# Patient Record
Sex: FEMALE | Race: WHITE | Employment: OTHER | URBAN - METROPOLITAN AREA
[De-identification: names, ages, dates, MRNs, and addresses within clinical notes are randomized per-mention and may not be internally consistent; named-entity substitution may affect disease eponyms.]

---

## 2017-03-06 ENCOUNTER — HOSPITAL ENCOUNTER (OUTPATIENT)
Dept: GENERAL RADIOLOGY | Age: 72
Discharge: HOME | End: 2017-03-06
Payer: MEDICARE

## 2017-03-06 DIAGNOSIS — M25.561 ACUTE PAIN OF RIGHT KNEE: ICD-10-CM

## 2017-03-06 DIAGNOSIS — M25.551 PAIN OF RIGHT HIP JOINT: ICD-10-CM

## 2017-03-06 PROCEDURE — 73502 X-RAY EXAM HIP UNI 2-3 VIEWS: CPT

## 2017-03-06 PROCEDURE — 73562 X-RAY EXAM OF KNEE 3: CPT

## 2018-02-09 ENCOUNTER — HOSPITAL ENCOUNTER (OUTPATIENT)
Dept: LAB | Age: 73
Discharge: HOME | End: 2018-02-09

## 2018-02-09 PROCEDURE — 88305 TISSUE EXAM BY PATHOLOGIST: CPT | Performed by: INTERNAL MEDICINE

## 2018-05-11 PROBLEM — D12.6 TUBULAR ADENOMA OF COLON: Status: ACTIVE | Noted: 2018-05-11

## 2018-05-11 PROBLEM — Z80.0 FH: COLON CANCER: Status: ACTIVE | Noted: 2018-05-11

## 2018-05-25 ENCOUNTER — HOSPITAL ENCOUNTER (OUTPATIENT)
Dept: GENERAL RADIOLOGY | Age: 73
Discharge: HOME | End: 2018-05-25
Payer: MEDICARE

## 2018-05-25 DIAGNOSIS — R05.9 COUGH: ICD-10-CM

## 2018-05-25 PROCEDURE — 71046 X-RAY EXAM CHEST 2 VIEWS: CPT

## 2018-05-25 NOTE — PROGRESS NOTES
Results given to pt. CXR only scarring. Pt to hold lotensin over the weekend and to continue taking the singular. Call us and let us know how the cough is on Tues. If cough persists then we will refer to pulmonary. Pt agrees with the plan.

## 2018-05-25 NOTE — PROGRESS NOTES
Nothing acute - old scarring. Continue on Singulair - cough may be lingering. Try to hold benazipril this weekend x 2-3 days and see if cough resolves. If it persists, we can send to pulmonary.   aw

## 2019-05-14 PROBLEM — R31.9 HEMATURIA: Status: ACTIVE | Noted: 2019-05-14

## 2019-05-14 PROBLEM — N81.2 CYSTOCELE AND RECTOCELE WITH INCOMPLETE UTEROVAGINAL PROLAPSE: Status: ACTIVE | Noted: 2019-05-14

## 2019-05-14 PROBLEM — M15.9 PRIMARY OSTEOARTHRITIS INVOLVING MULTIPLE JOINTS: Status: ACTIVE | Noted: 2019-05-14

## 2019-08-13 ENCOUNTER — HOSPITAL ENCOUNTER (OUTPATIENT)
Dept: PHYSICAL THERAPY | Age: 74
Discharge: HOME | End: 2019-08-13
Payer: MEDICARE

## 2019-08-13 ENCOUNTER — HOME HEALTH ADMISSION (OUTPATIENT)
Dept: HOME HEALTH SERVICES | Facility: HOME HEALTH | Age: 74
End: 2019-08-13
Payer: MEDICARE

## 2019-08-13 ENCOUNTER — HOSPITAL ENCOUNTER (OUTPATIENT)
Dept: SURGERY | Age: 74
Discharge: HOME | End: 2019-08-13
Payer: MEDICARE

## 2019-08-13 VITALS
WEIGHT: 166 LBS | HEIGHT: 67 IN | DIASTOLIC BLOOD PRESSURE: 85 MMHG | TEMPERATURE: 96.7 F | HEART RATE: 89 BPM | SYSTOLIC BLOOD PRESSURE: 130 MMHG | OXYGEN SATURATION: 97 % | BODY MASS INDEX: 26.06 KG/M2 | RESPIRATION RATE: 16 BRPM

## 2019-08-13 LAB
ANION GAP SERPL CALC-SCNC: 5 MMOL/L (ref 7–16)
APTT PPP: 25.6 SEC (ref 24.7–39.8)
ATRIAL RATE: 79 BPM
BACTERIA SPEC CULT: ABNORMAL
BASOPHILS # BLD: 0.1 K/UL (ref 0–0.2)
BASOPHILS NFR BLD: 1 % (ref 0–2)
BUN SERPL-MCNC: 10 MG/DL (ref 8–23)
CALCIUM SERPL-MCNC: 9.7 MG/DL (ref 8.3–10.4)
CALCULATED P AXIS, ECG09: 74 DEGREES
CALCULATED R AXIS, ECG10: -6 DEGREES
CALCULATED T AXIS, ECG11: 73 DEGREES
CHLORIDE SERPL-SCNC: 107 MMOL/L (ref 98–107)
CO2 SERPL-SCNC: 30 MMOL/L (ref 21–32)
CREAT SERPL-MCNC: 0.87 MG/DL (ref 0.6–1)
DIAGNOSIS, 93000: NORMAL
DIFFERENTIAL METHOD BLD: ABNORMAL
EOSINOPHIL # BLD: 0.4 K/UL (ref 0–0.8)
EOSINOPHIL NFR BLD: 5 % (ref 0.5–7.8)
ERYTHROCYTE [DISTWIDTH] IN BLOOD BY AUTOMATED COUNT: 13.2 % (ref 11.9–14.6)
GLUCOSE SERPL-MCNC: 111 MG/DL (ref 65–100)
HCT VFR BLD AUTO: 47.9 % (ref 35.8–46.3)
HGB BLD-MCNC: 15.1 G/DL (ref 11.7–15.4)
IMM GRANULOCYTES # BLD AUTO: 0 K/UL (ref 0–0.5)
IMM GRANULOCYTES NFR BLD AUTO: 0 % (ref 0–5)
INR PPP: 1
LYMPHOCYTES # BLD: 2.6 K/UL (ref 0.5–4.6)
LYMPHOCYTES NFR BLD: 33 % (ref 13–44)
MCH RBC QN AUTO: 27.8 PG (ref 26.1–32.9)
MCHC RBC AUTO-ENTMCNC: 31.5 G/DL (ref 31.4–35)
MCV RBC AUTO: 88.2 FL (ref 79.6–97.8)
MONOCYTES # BLD: 0.8 K/UL (ref 0.1–1.3)
MONOCYTES NFR BLD: 10 % (ref 4–12)
NEUTS SEG # BLD: 4 K/UL (ref 1.7–8.2)
NEUTS SEG NFR BLD: 51 % (ref 43–78)
NRBC # BLD: 0 K/UL (ref 0–0.2)
P-R INTERVAL, ECG05: 174 MS
PLATELET # BLD AUTO: 256 K/UL (ref 150–450)
PMV BLD AUTO: 8.8 FL (ref 9.4–12.3)
POTASSIUM SERPL-SCNC: 3.7 MMOL/L (ref 3.5–5.1)
PROTHROMBIN TIME: 13.1 SEC (ref 11.7–14.5)
Q-T INTERVAL, ECG07: 380 MS
QRS DURATION, ECG06: 74 MS
QTC CALCULATION (BEZET), ECG08: 435 MS
RBC # BLD AUTO: 5.43 M/UL (ref 4.05–5.2)
SERVICE CMNT-IMP: ABNORMAL
SODIUM SERPL-SCNC: 142 MMOL/L (ref 136–145)
VENTRICULAR RATE, ECG03: 79 BPM
WBC # BLD AUTO: 7.9 K/UL (ref 4.3–11.1)

## 2019-08-13 PROCEDURE — 85025 COMPLETE CBC W/AUTO DIFF WBC: CPT

## 2019-08-13 PROCEDURE — 80048 BASIC METABOLIC PNL TOTAL CA: CPT

## 2019-08-13 PROCEDURE — 77030027138 HC INCENT SPIROMETER -A

## 2019-08-13 PROCEDURE — 36415 COLL VENOUS BLD VENIPUNCTURE: CPT

## 2019-08-13 PROCEDURE — 85730 THROMBOPLASTIN TIME PARTIAL: CPT

## 2019-08-13 PROCEDURE — 85610 PROTHROMBIN TIME: CPT

## 2019-08-13 PROCEDURE — 87641 MR-STAPH DNA AMP PROBE: CPT

## 2019-08-13 PROCEDURE — 97161 PT EVAL LOW COMPLEX 20 MIN: CPT

## 2019-08-13 PROCEDURE — 93005 ELECTROCARDIOGRAM TRACING: CPT | Performed by: ANESTHESIOLOGY

## 2019-08-13 NOTE — PROGRESS NOTES
SW met with pt in Prehab to discuss Right HALEIGH scheduled for 9/5/19. Pt plans to return home with spouse and HHPT at [place]. Pt resides in [facility] and is agreeable to [facility] . [facility] referral completed. Pt does not have any DME and will need a RW and BSC at [place]. SW will meet with pt postop to verify DME needs. No additional needs or questions identified at this time.    Sudha Jordan

## 2019-08-13 NOTE — PERIOP NOTES
Patient verified name and . Order for consent is found in EHR and matches case posting; patient verified. Type 3 surgery, Joint camp assessment complete. Labs per surgeon: cbc,bmp,pt,ptt,ua,mrsa swab ; results within anesthesia protocols. Labs per anesthesia protocol: included in surgeon's orders. EKG:done today - is within anesthesia protocols. MRSA/MSSA swab collected; pharmacy to review and dose antibiotic as appropriate. Hospital approved surgical skin cleanser and instructions to return bottle on DOS given per hospital policy. Patient provided with handouts including Guide to Surgery, Pain Management, Hand Hygiene, Blood Transfusion Education, and [place] [facility] Brochure. Patient answered medical/surgical history questions at their best of ability. All prior to admission medications documented in [facility]. Original medication prescription bottle not visualized during patient appointment. Patient instructed to hold all vitamins 7 days prior to surgery and NSAIDS 5 days prior to surgery. Prescription medications to hold: ASA, MVI    Patient instructed to continue previous medications as prescribed prior to surgery and to take the following medications the day of surgery according to anesthesia guidelines with a small sip of water: amlodipine, levothyroxine. Patient teach back successful and patient demonstrates knowledge of instruction.

## 2019-08-13 NOTE — PERIOP NOTES
Your patient recently had labs drawn during a hospital appointment due to an upcoming surgery. The results are attached. If you have any questions or concerns please reach out to your patient for a follow-up in your office. Please do not respond to this message as my mailbox is not monitored. You may call [phone] with questions or concerns. Recent Results (from the past 12 hour(s))   CBC WITH AUTOMATED DIFF    Collection Time: 08/13/19  7:30 AM   Result Value Ref Range    WBC 7.9 4.3 - 11.1 K/uL    RBC 5.43 (H) 4.05 - 5.2 M/uL    HGB 15.1 11.7 - 15.4 g/dL    HCT 47.9 (H) 35.8 - 46.3 %    MCV 88.2 79.6 - 97.8 FL    MCH 27.8 26.1 - 32.9 PG    MCHC 31.5 31.4 - 35.0 g/dL    RDW 13.2 11.9 - 14.6 %    PLATELET 347 709 - 918 K/uL    MPV 8.8 (L) 9.4 - 12.3 FL    ABSOLUTE NRBC 0.00 0.0 - 0.2 K/uL    DF AUTOMATED      NEUTROPHILS 51 43 - 78 %    LYMPHOCYTES 33 13 - 44 %    MONOCYTES 10 4.0 - 12.0 %    EOSINOPHILS 5 0.5 - 7.8 %    BASOPHILS 1 0.0 - 2.0 %    IMMATURE GRANULOCYTES 0 0.0 - 5.0 %    ABS. NEUTROPHILS 4.0 1.7 - 8.2 K/UL    ABS. LYMPHOCYTES 2.6 0.5 - 4.6 K/UL    ABS. MONOCYTES 0.8 0.1 - 1.3 K/UL    ABS. EOSINOPHILS 0.4 0.0 - 0.8 K/UL    ABS. BASOPHILS 0.1 0.0 - 0.2 K/UL    ABS. IMM.  GRANS. 0.0 0.0 - 0.5 K/UL   METABOLIC PANEL, BASIC    Collection Time: 08/13/19  7:30 AM   Result Value Ref Range    Sodium 142 136 - 145 mmol/L    Potassium 3.7 3.5 - 5.1 mmol/L    Chloride 107 98 - 107 mmol/L    CO2 30 21 - 32 mmol/L    Anion gap 5 (L) 7 - 16 mmol/L    Glucose 111 (H) 65 - 100 mg/dL    BUN 10 8 - 23 MG/DL    Creatinine 0.87 0.6 - 1.0 MG/DL    GFR est AA >60 >60 ml/min/1.73m2    GFR est non-AA >60 >60 ml/min/1.73m2    Calcium 9.7 8.3 - 10.4 MG/DL   PROTHROMBIN TIME + INR    Collection Time: 08/13/19  7:30 AM   Result Value Ref Range    Prothrombin time 13.1 11.7 - 14.5 sec    INR 1.0     PTT    Collection Time: 08/13/19  7:30 AM   Result Value Ref Range    aPTT 25.6 24.7 - 39.8 SEC   EKG, 12 LEAD, INITIAL Collection Time: 08/13/19  8:25 AM   Result Value Ref Range    Ventricular Rate 79 BPM    Atrial Rate 79 BPM    P-R Interval 174 ms    QRS Duration 74 ms    Q-T Interval 380 ms    QTC Calculation (Bezet) 435 ms    Calculated P Axis 74 degrees    Calculated R Axis -6 degrees    Calculated T Axis 73 degrees    Diagnosis       Sinus rhythm with Premature supraventricular complexes  Otherwise normal ECG  No previous ECGs available

## 2019-08-22 NOTE — ADVANCED PRACTICE NURSE
Total Joint Surgery Preoperative Chart Review      Patient ID:  Chanel William  [id number]  53 y.o.  1945  Surgeon: Dr. Hortensia Rankin  Date of Surgery: 9/5/2019  Procedure: Total Right Hip Arthroplasty  Primary Care Physician: Rich Bergeron 58  Specialty Physician(s):      Subjective:   Chanel William is a 69 y.o. WHITE [place]  female who presents for preoperative evaluation for Total Right Hip arthroplasty. This is a preoperative chart review note based on data collected by the nurse at the surgical Pre-Assessment visit. Past Medical History:   Diagnosis Date    Agatston coronary artery calcium score less than 100 5/2014    Calcium score of 24; all in LAD; referred to cardiology    Allergic rhinitis 1/22/2014    Arthritis     Colon polyps 1/2015    H/O cardiovascular stress test 5/2014    within normal    H/O mammogram 7/2013    with Dr. Karel Crisostomo Hematuria     HTN (hypertension) 1/22/2014    Hypercholesterolemia     Hypothyroidism 1/22/2014    Otitis externa     Otitis media     Pap smear for cervical cancer screening 7/2013    Dr. Karel Crisostomo Thyroid disease     URI (upper respiratory infection)       Past Surgical History:   Procedure Laterality Date    HX COLONOSCOPY  1/2015    repeat in 3 yrs (Dr. Ramesh Bernal)     Family History   Problem Relation Age of Onset    Heart Disease Father     Dementia Mother     Hypertension Mother     Glaucoma Mother     Heart Disease Brother     Glaucoma Brother     Diabetes Brother     Cancer Sister         Colon Cancer    No Known Problems Sister     Heart Disease Other     Diabetes Other     Hypertension Paternal Uncle       Social History     Tobacco Use    Smoking status: Never Smoker    Smokeless tobacco: Never Used   Substance Use Topics    Alcohol use: No       Prior to Admission medications    Medication Sig Start Date End Date Taking? Authorizing Provider   benazepril (LOTENSIN) 20 mg tablet Take 1 Tab by mouth daily. 11/20/18  Yes Sweta Foy MD   amLODIPine (NORVASC) 5 mg tablet Take 1 Tab by mouth daily. Patient taking differently: Take 5 mg by mouth daily. Take / use AM day of surgery  per anesthesia protocols. Indications: high blood pressure 11/20/18  Yes Sweta Foy MD   levothyroxine (SYNTHROID) 25 mcg tablet Take 1 Tab by mouth Daily (before breakfast). Patient taking differently: Take 25 mcg by mouth Daily (before breakfast). Take / use AM day of surgery  per anesthesia protocols. Indications: hypothyroidism 11/20/18  Yes Sweta Foy MD   pravastatin (PRAVACHOL) 10 mg tablet Take 1 Tab by mouth nightly. 11/20/18  Yes Sweta Foy MD   multivitamin (ONE A DAY) tablet Take 1 Tab by mouth daily. Yes Provider, Historical   aspirin delayed-release 81 mg tablet Take 81 mg by mouth daily. Yes Provider, Historical     No Known Allergies       Objective:     Physical Exam:   No data found. ECG:    EKG Results     Procedure 720 Value Units Date/Time    EKG, 12 LEAD, INITIAL [[id number]] Collected:  08/13/19 0825    Order Status:  Completed Updated:  08/13/19 1016     Ventricular Rate 79 BPM      Atrial Rate 79 BPM      P-R Interval 174 ms      QRS Duration 74 ms      Q-T Interval 380 ms      QTC Calculation (Bezet) 435 ms      Calculated P Axis 74 degrees      Calculated R Axis -6 degrees      Calculated T Axis 73 degrees      Diagnosis --     Sinus rhythm with Premature supraventricular complexes  Otherwise normal ECG  No previous ECGs available  Confirmed by Lili Child MD (), Prosper Garber ([id number]) on 8/13/2019 10:16:28 AM            Data Review:   Labs:     Results for Meena Ho (MRN [id number]) as of 8/22/2019 12:33   Ref.  Range 8/13/2019 07:30   Sodium Latest Ref Range: 136 - 145 mmol/L 142   Potassium Latest Ref Range: 3.5 - 5.1 mmol/L 3.7   Chloride Latest Ref Range: 98 - 107 mmol/L 107   CO2 Latest Ref Range: 21 - 32 mmol/L 30   Anion gap Latest Ref Range: 7 - 16 mmol/L 5 (L) Glucose Latest Ref Range: 65 - 100 mg/dL 111 (H)   BUN Latest Ref Range: 8 - 23 MG/DL 10   Creatinine Latest Ref Range: 0.6 - 1.0 MG/DL 0.87   Calcium Latest Ref Range: 8.3 - 10.4 MG/DL 9.7   GFR est non-AA Latest Ref Range: >60 ml/min/1.73m2 >60   GFR est AA Latest Ref Range: >60 ml/min/1.73m2 >60       Problem List:  )  Patient Active Problem List   Diagnosis Code    Allergic rhinitis J30.9    HTN (hypertension) I10    Hypothyroidism E03.9    Hypercholesterolemia E78.00    Diverticulosis of large intestine without hemorrhage K57.30    FH: colon cancer Z80.0    Tubular adenoma of colon D12.6    Cystocele and rectocele with incomplete uterovaginal prolapse N81.2    Primary osteoarthritis involving multiple joints M15.0    Hematuria R31.9       Total Joint Surgery Pre-Assessment Recommendations:       Recommend continuous saturation monitoring hours of sleep, during hospitalization.           Signed By: DORIAN Khan    August 22, 2019

## 2019-09-04 ENCOUNTER — ANESTHESIA EVENT (OUTPATIENT)
Dept: SURGERY | Age: 74
DRG: 470 | End: 2019-09-04
Payer: MEDICARE

## 2019-09-04 NOTE — H&P
H&P    Patient ID:  Ingrid Zavala  [id number]  89 y.o.  1945  Surgeon:  Felisa Najjar, MD  Date of Surgery: * No surgery date entered *  Procedure: Right Hip Total Arthroplasty  Primary Care Physician: Maida Leger MD        Subjective:  Ingrid Zavala is a 69 y.o. WHITE OR  female who presents with Right Hip pain. She has history of Right Hip pain for several months. Symptoms worse with walking long distances and relieved with rest. Conservative treatment consisting of  activity modification has not helped. The patient lives with their family. The patients goal after surgery is improved pain and function.         Past Medical History:   Diagnosis Date    Agatston coronary artery calcium score less than 100 5/2014    Calcium score of 24; all in LAD; referred to cardiology    Allergic rhinitis 1/22/2014    Arthritis     Colon polyps 1/2015    H/O cardiovascular stress test 5/2014    within normal    H/O mammogram 7/2013    with Dr. Leonila Hare Hematuria     HTN (hypertension) 1/22/2014    Hypercholesterolemia     Hypothyroidism 1/22/2014    Otitis externa     Otitis media     Pap smear for cervical cancer screening 7/2013    Dr. Leonila Hare Thyroid disease     URI (upper respiratory infection)       Past Surgical History:   Procedure Laterality Date    HX COLONOSCOPY  1/2015    repeat in 3 yrs (Dr. Tari Lyn)     Family History   Problem Relation Age of Onset    Heart Disease Father     Dementia Mother     Hypertension Mother     Glaucoma Mother     Heart Disease Brother     Glaucoma Brother     Diabetes Brother     Cancer Sister         Colon Cancer    No Known Problems Sister     Heart Disease Other     Diabetes Other     Hypertension Paternal Uncle       Social History     Tobacco Use    Smoking status: Never Smoker    Smokeless tobacco: Never Used   Substance Use Topics    Alcohol use: No       Prior to Admission medications    Medication Sig Start Date End Date Taking? Authorizing Provider   benazepril (LOTENSIN) 20 mg tablet Take 1 Tab by mouth daily. 11/20/18   Juancho Lynch MD   amLODIPine (NORVASC) 5 mg tablet Take 1 Tab by mouth daily. Patient taking differently: Take 5 mg by mouth daily. Take / use AM day of surgery  per anesthesia protocols. Indications: high blood pressure 11/20/18   Juancho Lynch MD   levothyroxine (SYNTHROID) 25 mcg tablet Take 1 Tab by mouth Daily (before breakfast). Patient taking differently: Take 25 mcg by mouth Daily (before breakfast). Take / use AM day of surgery  per anesthesia protocols. Indications: hypothyroidism 11/20/18   Ceci Quiñones MD   pravastatin (PRAVACHOL) 10 mg tablet Take 1 Tab by mouth nightly. 11/20/18   Jaycee Rhodes MD   multivitamin (ONE A DAY) tablet Take 1 Tab by mouth daily. Provider, Historical   aspirin delayed-release 81 mg tablet Take 81 mg by mouth daily. Provider, Historical     No Known Allergies     REVIEW OF SYSTEMS:  CONSTITUTIONAL: Denies fever, decreased appetite, weight loss/gain, night sweats or fatigue. HEENT: Denies vision or hearing changes. denies glasses. denies hearing aids. CARDIAC: Denies CP, palpitations, rheumatic fever, murmur, peripheral edema, carotid artery disease or syncopal episodes. RESPIRATORY: Denies dyspnea on exertion, asthma, COPD or orthopnea. GI: Denies GERD, history of GI bleed or melena, PUD, hepatitis or cirrhosis. : Denies dysuria, hematuria. denies BPH symptoms. HEMATOLOGIC: Denies anemia or blood disorders. ENDOCRINE: Denies thyroid disease. MUSCULOSKELETAL: See HPI. NEUROLOGIC: Denies seizure, peripheral neuropathy or memory loss. PSYCH: Denies depression, anxiety or insomnia. SKIN: Denies rash or open sores. Objective:    PHYSICAL EXAM  GENERAL:   Patient Vitals for the past 8 hrs:   Height Weight   09/04/19 0800 5' 6.5\" (1.689 m) 75.3 kg (166 lb)    EYES: PERRL. EOM intact. MOUTH:Teeth and Gums normal. NECK: Full ROM.  Trachea midline. No thyromegaly or JVD. CARDIOVASCULAR: Regular rate and rhythm. No murmur or gallops. No carotid bruits. Peripheral pulses: radial 2 +, PT 2+, DP 2+ bilaterally. LUNGS: CTA bilaterally. No wheezes, rhonchi or rales. GI: positive BS. Abdomen nontender. NEUROLOGIC: Alert and oriented x 3. Bilateral equal strong had grasp and bilateral equal strong plantar flexion and dorsiflexion. GAIT: abnormal MUSCULOSKELETAL: ROM: full without pain. Tenderness: . Crepitus: not present. SKIN: No rash, bruising, swelling, redness or warmth. Labs:  No results found for this or any previous visit (from the past 24 hour(s)). Xray Right Hip: joint space narrowing    Assessment:  Advanced Right Hip Osteoarthritis. Total Right Hip Arthroplasty Indicated. Patient Active Problem List   Diagnosis Code    Allergic rhinitis J30.9    HTN (hypertension) I10    Hypothyroidism E03.9    Hypercholesterolemia E78.00    Diverticulosis of large intestine without hemorrhage K57.30    FH: colon cancer Z80.0    Tubular adenoma of colon D12.6    Cystocele and rectocele with incomplete uterovaginal prolapse N81.2    Primary osteoarthritis involving multiple joints M15.0    Hematuria R31.9       Plan:  I have advised the patient of the risks and consequences, including possible complications of performing total joint replacement, as well as not doing this operation. The patient had the opportunity to ask questions and have them answered to their satisfaction.      Signed:  HEIDI Carmen 9/4/2019

## 2019-09-05 ENCOUNTER — HOSPITAL ENCOUNTER (INPATIENT)
Age: 74
LOS: 1 days | Discharge: HOME HEALTH | DRG: 470 | End: 2019-09-06
Attending: ORTHOPAEDIC SURGERY | Admitting: ORTHOPAEDIC SURGERY
Payer: MEDICARE

## 2019-09-05 ENCOUNTER — APPOINTMENT (OUTPATIENT)
Dept: GENERAL RADIOLOGY | Age: 74
DRG: 470 | End: 2019-09-05
Attending: ORTHOPAEDIC SURGERY
Payer: MEDICARE

## 2019-09-05 ENCOUNTER — ANESTHESIA (OUTPATIENT)
Dept: SURGERY | Age: 74
DRG: 470 | End: 2019-09-05
Payer: MEDICARE

## 2019-09-05 DIAGNOSIS — Z96.641 H/O TOTAL HIP ARTHROPLASTY, RIGHT: Primary | ICD-10-CM

## 2019-09-05 PROBLEM — M16.11 ARTHRITIS OF RIGHT HIP: Status: ACTIVE | Noted: 2019-09-05

## 2019-09-05 LAB
GLUCOSE BLD STRIP.AUTO-MCNC: 99 MG/DL (ref 65–100)
HGB BLD-MCNC: 12.9 G/DL (ref 11.7–15.4)

## 2019-09-05 PROCEDURE — 77030008462 HC STPLR SKN PROX J&J -A: Performed by: ORTHOPAEDIC SURGERY

## 2019-09-05 PROCEDURE — 77030012935 HC DRSG AQUACEL BMS -B: Performed by: ORTHOPAEDIC SURGERY

## 2019-09-05 PROCEDURE — 77030003666 HC NDL SPINAL BD -A: Performed by: ORTHOPAEDIC SURGERY

## 2019-09-05 PROCEDURE — 77030031139 HC SUT VCRL2 J&J -A: Performed by: ORTHOPAEDIC SURGERY

## 2019-09-05 PROCEDURE — 74011250636 HC RX REV CODE- 250/636: Performed by: ORTHOPAEDIC SURGERY

## 2019-09-05 PROCEDURE — C1776 JOINT DEVICE (IMPLANTABLE): HCPCS | Performed by: ORTHOPAEDIC SURGERY

## 2019-09-05 PROCEDURE — 74011250636 HC RX REV CODE- 250/636

## 2019-09-05 PROCEDURE — 94760 N-INVAS EAR/PLS OXIMETRY 1: CPT

## 2019-09-05 PROCEDURE — 77030037713 HC CLOSR DEV INCIS ZIP STRY -B: Performed by: ORTHOPAEDIC SURGERY

## 2019-09-05 PROCEDURE — 77030018836 HC SOL IRR NACL ICUM -A: Performed by: ORTHOPAEDIC SURGERY

## 2019-09-05 PROCEDURE — 77030018074 HC RTVR SUT ARTH4 S&N -B: Performed by: ORTHOPAEDIC SURGERY

## 2019-09-05 PROCEDURE — 74011000258 HC RX REV CODE- 258: Performed by: ORTHOPAEDIC SURGERY

## 2019-09-05 PROCEDURE — 77030040361 HC SLV COMPR DVT MDII -B

## 2019-09-05 PROCEDURE — 72170 X-RAY EXAM OF PELVIS: CPT

## 2019-09-05 PROCEDURE — 97161 PT EVAL LOW COMPLEX 20 MIN: CPT

## 2019-09-05 PROCEDURE — 36415 COLL VENOUS BLD VENIPUNCTURE: CPT

## 2019-09-05 PROCEDURE — 74011250637 HC RX REV CODE- 250/637: Performed by: ORTHOPAEDIC SURGERY

## 2019-09-05 PROCEDURE — 97165 OT EVAL LOW COMPLEX 30 MIN: CPT

## 2019-09-05 PROCEDURE — 97535 SELF CARE MNGMENT TRAINING: CPT

## 2019-09-05 PROCEDURE — 97110 THERAPEUTIC EXERCISES: CPT

## 2019-09-05 PROCEDURE — 76060000034 HC ANESTHESIA 1.5 TO 2 HR: Performed by: ORTHOPAEDIC SURGERY

## 2019-09-05 PROCEDURE — 74011000250 HC RX REV CODE- 250

## 2019-09-05 PROCEDURE — 77030013708 HC HNDPC SUC IRR PULS STRY –B: Performed by: ORTHOPAEDIC SURGERY

## 2019-09-05 PROCEDURE — 76210000006 HC OR PH I REC 0.5 TO 1 HR: Performed by: ORTHOPAEDIC SURGERY

## 2019-09-05 PROCEDURE — 76010000162 HC OR TIME 1.5 TO 2 HR INTENSV-TIER 1: Performed by: ORTHOPAEDIC SURGERY

## 2019-09-05 PROCEDURE — 0SR90JA REPLACEMENT OF RIGHT HIP JOINT WITH SYNTHETIC SUBSTITUTE, UNCEMENTED, OPEN APPROACH: ICD-10-PCS | Performed by: ORTHOPAEDIC SURGERY

## 2019-09-05 PROCEDURE — 74011250637 HC RX REV CODE- 250/637: Performed by: ANESTHESIOLOGY

## 2019-09-05 PROCEDURE — 77030037715 HC DRSG ZIP STRY -B: Performed by: ORTHOPAEDIC SURGERY

## 2019-09-05 PROCEDURE — 77030018547 HC SUT ETHBND1 J&J -B: Performed by: ORTHOPAEDIC SURGERY

## 2019-09-05 PROCEDURE — 77030006835 HC BLD SAW SAG STRY -B: Performed by: ORTHOPAEDIC SURGERY

## 2019-09-05 PROCEDURE — 74011250636 HC RX REV CODE- 250/636: Performed by: ANESTHESIOLOGY

## 2019-09-05 PROCEDURE — 65270000029 HC RM PRIVATE

## 2019-09-05 PROCEDURE — 77030018673: Performed by: ORTHOPAEDIC SURGERY

## 2019-09-05 PROCEDURE — 94762 N-INVAS EAR/PLS OXIMTRY CONT: CPT

## 2019-09-05 PROCEDURE — 77030002966 HC SUT PDS J&J -A: Performed by: ORTHOPAEDIC SURGERY

## 2019-09-05 PROCEDURE — 74011000250 HC RX REV CODE- 250: Performed by: ORTHOPAEDIC SURGERY

## 2019-09-05 PROCEDURE — 85018 HEMOGLOBIN: CPT

## 2019-09-05 PROCEDURE — 82962 GLUCOSE BLOOD TEST: CPT

## 2019-09-05 PROCEDURE — 77030020263 HC SOL INJ SOD CL0.9% LFCR 1000ML

## 2019-09-05 DEVICE — STEM FEM TAPR SZ4 STD OFFSET -- ACTIS: Type: IMPLANTABLE DEVICE | Site: HIP | Status: FUNCTIONAL

## 2019-09-05 DEVICE — LINER ACET OD56MM ID36MM HIP ALTRX PINN: Type: IMPLANTABLE DEVICE | Site: HIP | Status: FUNCTIONAL

## 2019-09-05 DEVICE — HEAD FEM DIA36MM +5MM OFFSET 12/14 TAPR HIP CERAMIC BIOLOX: Type: IMPLANTABLE DEVICE | Site: HIP | Status: FUNCTIONAL

## 2019-09-05 DEVICE — CUP ACET DIA56MM HIP GRIPTION PRI CEMENTLESS FIX 100 SER: Type: IMPLANTABLE DEVICE | Site: HIP | Status: FUNCTIONAL

## 2019-09-05 DEVICE — ELIMINATOR H APEX FOR 48-60MM PINN HIP SHELL: Type: IMPLANTABLE DEVICE | Site: HIP | Status: FUNCTIONAL

## 2019-09-05 RX ORDER — DIPHENHYDRAMINE HCL 25 MG
25 CAPSULE ORAL
Status: DISCONTINUED | OUTPATIENT
Start: 2019-09-05 | End: 2019-09-06 | Stop reason: HOSPADM

## 2019-09-05 RX ORDER — MIDAZOLAM HYDROCHLORIDE 1 MG/ML
2 INJECTION, SOLUTION INTRAMUSCULAR; INTRAVENOUS
Status: DISCONTINUED | OUTPATIENT
Start: 2019-09-05 | End: 2019-09-05 | Stop reason: HOSPADM

## 2019-09-05 RX ORDER — HYDROMORPHONE HYDROCHLORIDE 2 MG/ML
0.5 INJECTION, SOLUTION INTRAMUSCULAR; INTRAVENOUS; SUBCUTANEOUS
Status: DISCONTINUED | OUTPATIENT
Start: 2019-09-05 | End: 2019-09-05 | Stop reason: HOSPADM

## 2019-09-05 RX ORDER — ONDANSETRON 2 MG/ML
4 INJECTION INTRAMUSCULAR; INTRAVENOUS
Status: DISCONTINUED | OUTPATIENT
Start: 2019-09-05 | End: 2019-09-05 | Stop reason: HOSPADM

## 2019-09-05 RX ORDER — TRANEXAMIC ACID 100 MG/ML
INJECTION, SOLUTION INTRAVENOUS AS NEEDED
Status: DISCONTINUED | OUTPATIENT
Start: 2019-09-05 | End: 2019-09-05 | Stop reason: HOSPADM

## 2019-09-05 RX ORDER — ASPIRIN 81 MG/1
81 TABLET ORAL EVERY 12 HOURS
Status: DISCONTINUED | OUTPATIENT
Start: 2019-09-05 | End: 2019-09-06 | Stop reason: HOSPADM

## 2019-09-05 RX ORDER — CEFAZOLIN SODIUM/WATER 2 G/20 ML
2 SYRINGE (ML) INTRAVENOUS ONCE
Status: COMPLETED | OUTPATIENT
Start: 2019-09-05 | End: 2019-09-05

## 2019-09-05 RX ORDER — DEXAMETHASONE SODIUM PHOSPHATE 4 MG/ML
INJECTION, SOLUTION INTRA-ARTICULAR; INTRALESIONAL; INTRAMUSCULAR; INTRAVENOUS; SOFT TISSUE AS NEEDED
Status: DISCONTINUED | OUTPATIENT
Start: 2019-09-05 | End: 2019-09-05

## 2019-09-05 RX ORDER — DEXAMETHASONE SODIUM PHOSPHATE 4 MG/ML
INJECTION, SOLUTION INTRA-ARTICULAR; INTRALESIONAL; INTRAMUSCULAR; INTRAVENOUS; SOFT TISSUE AS NEEDED
Status: DISCONTINUED | OUTPATIENT
Start: 2019-09-05 | End: 2019-09-05 | Stop reason: HOSPADM

## 2019-09-05 RX ORDER — ROPIVACAINE HYDROCHLORIDE 2 MG/ML
INJECTION, SOLUTION EPIDURAL; INFILTRATION; PERINEURAL AS NEEDED
Status: DISCONTINUED | OUTPATIENT
Start: 2019-09-05 | End: 2019-09-05 | Stop reason: HOSPADM

## 2019-09-05 RX ORDER — SODIUM CHLORIDE 0.9 % (FLUSH) 0.9 %
5-40 SYRINGE (ML) INJECTION EVERY 8 HOURS
Status: DISCONTINUED | OUTPATIENT
Start: 2019-09-05 | End: 2019-09-06 | Stop reason: HOSPADM

## 2019-09-05 RX ORDER — LIDOCAINE HYDROCHLORIDE 10 MG/ML
0.1 INJECTION INFILTRATION; PERINEURAL AS NEEDED
Status: DISCONTINUED | OUTPATIENT
Start: 2019-09-05 | End: 2019-09-05 | Stop reason: HOSPADM

## 2019-09-05 RX ORDER — SODIUM CHLORIDE 0.9 % (FLUSH) 0.9 %
5-40 SYRINGE (ML) INJECTION AS NEEDED
Status: DISCONTINUED | OUTPATIENT
Start: 2019-09-05 | End: 2019-09-06 | Stop reason: HOSPADM

## 2019-09-05 RX ORDER — NALOXONE HYDROCHLORIDE 0.4 MG/ML
.2-.4 INJECTION, SOLUTION INTRAMUSCULAR; INTRAVENOUS; SUBCUTANEOUS
Status: DISCONTINUED | OUTPATIENT
Start: 2019-09-05 | End: 2019-09-06 | Stop reason: HOSPADM

## 2019-09-05 RX ORDER — LIDOCAINE HYDROCHLORIDE 20 MG/ML
INJECTION, SOLUTION EPIDURAL; INFILTRATION; INTRACAUDAL; PERINEURAL AS NEEDED
Status: DISCONTINUED | OUTPATIENT
Start: 2019-09-05 | End: 2019-09-05 | Stop reason: HOSPADM

## 2019-09-05 RX ORDER — ACETAMINOPHEN 500 MG
1000 TABLET ORAL ONCE
Status: COMPLETED | OUTPATIENT
Start: 2019-09-05 | End: 2019-09-05

## 2019-09-05 RX ORDER — PROMETHAZINE HYDROCHLORIDE 25 MG/1
25 TABLET ORAL
Status: DISCONTINUED | OUTPATIENT
Start: 2019-09-05 | End: 2019-09-06 | Stop reason: HOSPADM

## 2019-09-05 RX ORDER — HYDROMORPHONE HYDROCHLORIDE 2 MG/1
2 TABLET ORAL
Status: DISCONTINUED | OUTPATIENT
Start: 2019-09-05 | End: 2019-09-06

## 2019-09-05 RX ORDER — ONDANSETRON 8 MG/1
8 TABLET, ORALLY DISINTEGRATING ORAL
Status: DISCONTINUED | OUTPATIENT
Start: 2019-09-05 | End: 2019-09-06 | Stop reason: HOSPADM

## 2019-09-05 RX ORDER — BENAZEPRIL HYDROCHLORIDE 20 MG/1
20 TABLET ORAL DAILY
Status: DISCONTINUED | OUTPATIENT
Start: 2019-09-06 | End: 2019-09-06 | Stop reason: HOSPADM

## 2019-09-05 RX ORDER — PRAVASTATIN SODIUM 20 MG/1
10 TABLET ORAL
Status: DISCONTINUED | OUTPATIENT
Start: 2019-09-05 | End: 2019-09-06 | Stop reason: HOSPADM

## 2019-09-05 RX ORDER — CELECOXIB 200 MG/1
200 CAPSULE ORAL EVERY 12 HOURS
Status: DISCONTINUED | OUTPATIENT
Start: 2019-09-05 | End: 2019-09-06 | Stop reason: HOSPADM

## 2019-09-05 RX ORDER — HYDROMORPHONE HYDROCHLORIDE 1 MG/ML
1 INJECTION, SOLUTION INTRAMUSCULAR; INTRAVENOUS; SUBCUTANEOUS
Status: DISCONTINUED | OUTPATIENT
Start: 2019-09-05 | End: 2019-09-06 | Stop reason: HOSPADM

## 2019-09-05 RX ORDER — CEFAZOLIN SODIUM/WATER 2 G/20 ML
2 SYRINGE (ML) INTRAVENOUS EVERY 8 HOURS
Status: COMPLETED | OUTPATIENT
Start: 2019-09-05 | End: 2019-09-05

## 2019-09-05 RX ORDER — PROPOFOL 10 MG/ML
INJECTION, EMULSION INTRAVENOUS
Status: DISCONTINUED | OUTPATIENT
Start: 2019-09-05 | End: 2019-09-05 | Stop reason: HOSPADM

## 2019-09-05 RX ORDER — DEXAMETHASONE SODIUM PHOSPHATE 100 MG/10ML
10 INJECTION INTRAMUSCULAR; INTRAVENOUS ONCE
Status: DISCONTINUED | OUTPATIENT
Start: 2019-09-06 | End: 2019-09-06 | Stop reason: HOSPADM

## 2019-09-05 RX ORDER — ONDANSETRON 2 MG/ML
INJECTION INTRAMUSCULAR; INTRAVENOUS AS NEEDED
Status: DISCONTINUED | OUTPATIENT
Start: 2019-09-05 | End: 2019-09-05 | Stop reason: HOSPADM

## 2019-09-05 RX ORDER — ACETAMINOPHEN 500 MG
1000 TABLET ORAL EVERY 6 HOURS
Status: DISCONTINUED | OUTPATIENT
Start: 2019-09-06 | End: 2019-09-06 | Stop reason: HOSPADM

## 2019-09-05 RX ORDER — EPHEDRINE SULFATE 50 MG/ML
INJECTION, SOLUTION INTRAVENOUS AS NEEDED
Status: DISCONTINUED | OUTPATIENT
Start: 2019-09-05 | End: 2019-09-05 | Stop reason: HOSPADM

## 2019-09-05 RX ORDER — AMOXICILLIN 250 MG
2 CAPSULE ORAL DAILY
Status: DISCONTINUED | OUTPATIENT
Start: 2019-09-06 | End: 2019-09-06 | Stop reason: HOSPADM

## 2019-09-05 RX ORDER — ALBUTEROL SULFATE 0.83 MG/ML
2.5 SOLUTION RESPIRATORY (INHALATION) AS NEEDED
Status: DISCONTINUED | OUTPATIENT
Start: 2019-09-05 | End: 2019-09-05 | Stop reason: HOSPADM

## 2019-09-05 RX ORDER — MIDAZOLAM HYDROCHLORIDE 1 MG/ML
INJECTION, SOLUTION INTRAMUSCULAR; INTRAVENOUS AS NEEDED
Status: DISCONTINUED | OUTPATIENT
Start: 2019-09-05 | End: 2019-09-05 | Stop reason: HOSPADM

## 2019-09-05 RX ORDER — ACETAMINOPHEN 10 MG/ML
1000 INJECTION, SOLUTION INTRAVENOUS ONCE
Status: COMPLETED | OUTPATIENT
Start: 2019-09-05 | End: 2019-09-05

## 2019-09-05 RX ORDER — BUPIVACAINE HYDROCHLORIDE 7.5 MG/ML
INJECTION, SOLUTION INTRASPINAL AS NEEDED
Status: DISCONTINUED | OUTPATIENT
Start: 2019-09-05 | End: 2019-09-05 | Stop reason: HOSPADM

## 2019-09-05 RX ORDER — SODIUM CHLORIDE 9 MG/ML
100 INJECTION, SOLUTION INTRAVENOUS CONTINUOUS
Status: DISCONTINUED | OUTPATIENT
Start: 2019-09-05 | End: 2019-09-06 | Stop reason: HOSPADM

## 2019-09-05 RX ORDER — OXYCODONE HYDROCHLORIDE 5 MG/1
5-10 TABLET ORAL
Status: DISCONTINUED | OUTPATIENT
Start: 2019-09-05 | End: 2019-09-05

## 2019-09-05 RX ORDER — SODIUM CHLORIDE, SODIUM LACTATE, POTASSIUM CHLORIDE, CALCIUM CHLORIDE 600; 310; 30; 20 MG/100ML; MG/100ML; MG/100ML; MG/100ML
1000 INJECTION, SOLUTION INTRAVENOUS CONTINUOUS
Status: DISCONTINUED | OUTPATIENT
Start: 2019-09-05 | End: 2019-09-05 | Stop reason: HOSPADM

## 2019-09-05 RX ORDER — LEVOTHYROXINE SODIUM 50 UG/1
25 TABLET ORAL
Status: DISCONTINUED | OUTPATIENT
Start: 2019-09-06 | End: 2019-09-06 | Stop reason: HOSPADM

## 2019-09-05 RX ORDER — AMLODIPINE BESYLATE 5 MG/1
5 TABLET ORAL DAILY
Status: DISCONTINUED | OUTPATIENT
Start: 2019-09-06 | End: 2019-09-06 | Stop reason: HOSPADM

## 2019-09-05 RX ADMIN — MIDAZOLAM HYDROCHLORIDE 1 MG: 1 INJECTION, SOLUTION INTRAMUSCULAR; INTRAVENOUS at 08:03

## 2019-09-05 RX ADMIN — PRAVASTATIN SODIUM 10 MG: 20 TABLET ORAL at 21:57

## 2019-09-05 RX ADMIN — TRANEXAMIC ACID 1000 MG: 100 INJECTION, SOLUTION INTRAVENOUS at 08:20

## 2019-09-05 RX ADMIN — ACETAMINOPHEN 1000 MG: 500 TABLET, FILM COATED ORAL at 07:05

## 2019-09-05 RX ADMIN — SODIUM CHLORIDE 100 ML/HR: 900 INJECTION, SOLUTION INTRAVENOUS at 11:00

## 2019-09-05 RX ADMIN — Medication 2 G: at 15:56

## 2019-09-05 RX ADMIN — SODIUM CHLORIDE, SODIUM LACTATE, POTASSIUM CHLORIDE, AND CALCIUM CHLORIDE 1000 ML: 600; 310; 30; 20 INJECTION, SOLUTION INTRAVENOUS at 07:06

## 2019-09-05 RX ADMIN — LIDOCAINE HYDROCHLORIDE 20 MG: 20 INJECTION, SOLUTION EPIDURAL; INFILTRATION; INTRACAUDAL; PERINEURAL at 08:24

## 2019-09-05 RX ADMIN — HYDROMORPHONE HYDROCHLORIDE 0.5 MG: 2 INJECTION INTRAMUSCULAR; INTRAVENOUS; SUBCUTANEOUS at 10:18

## 2019-09-05 RX ADMIN — Medication 1 AMPULE: at 21:58

## 2019-09-05 RX ADMIN — ONDANSETRON 8 MG: 8 TABLET, ORALLY DISINTEGRATING ORAL at 18:58

## 2019-09-05 RX ADMIN — PROPOFOL 50 MCG/KG/MIN: 10 INJECTION, EMULSION INTRAVENOUS at 08:17

## 2019-09-05 RX ADMIN — OXYCODONE HYDROCHLORIDE 10 MG: 5 TABLET ORAL at 12:13

## 2019-09-05 RX ADMIN — OXYCODONE HYDROCHLORIDE 10 MG: 5 TABLET ORAL at 15:55

## 2019-09-05 RX ADMIN — ACETAMINOPHEN 1000 MG: 500 TABLET, FILM COATED ORAL at 23:17

## 2019-09-05 RX ADMIN — ONDANSETRON 8 MG: 8 TABLET, ORALLY DISINTEGRATING ORAL at 12:12

## 2019-09-05 RX ADMIN — BUPIVACAINE HYDROCHLORIDE 1.6 ML: 7.5 INJECTION, SOLUTION INTRASPINAL at 08:12

## 2019-09-05 RX ADMIN — ONDANSETRON 4 MG: 2 INJECTION INTRAMUSCULAR; INTRAVENOUS at 08:15

## 2019-09-05 RX ADMIN — DEXAMETHASONE SODIUM PHOSPHATE 10 MG: 4 INJECTION, SOLUTION INTRA-ARTICULAR; INTRALESIONAL; INTRAMUSCULAR; INTRAVENOUS; SOFT TISSUE at 08:15

## 2019-09-05 RX ADMIN — Medication 2 G: at 23:17

## 2019-09-05 RX ADMIN — SODIUM CHLORIDE, SODIUM LACTATE, POTASSIUM CHLORIDE, AND CALCIUM CHLORIDE: 600; 310; 30; 20 INJECTION, SOLUTION INTRAVENOUS at 08:00

## 2019-09-05 RX ADMIN — EPHEDRINE SULFATE 10 MG: 50 INJECTION, SOLUTION INTRAVENOUS at 08:42

## 2019-09-05 RX ADMIN — HYDROMORPHONE HYDROCHLORIDE 2 MG: 2 TABLET ORAL at 23:17

## 2019-09-05 RX ADMIN — CELECOXIB 200 MG: 200 CAPSULE ORAL at 21:58

## 2019-09-05 RX ADMIN — SODIUM CHLORIDE, SODIUM LACTATE, POTASSIUM CHLORIDE, AND CALCIUM CHLORIDE: 600; 310; 30; 20 INJECTION, SOLUTION INTRAVENOUS at 09:01

## 2019-09-05 RX ADMIN — ASPIRIN 81 MG: 81 TABLET, COATED ORAL at 21:57

## 2019-09-05 RX ADMIN — MIDAZOLAM HYDROCHLORIDE 1 MG: 1 INJECTION, SOLUTION INTRAMUSCULAR; INTRAVENOUS at 08:06

## 2019-09-05 RX ADMIN — PROMETHAZINE HYDROCHLORIDE 25 MG: 25 TABLET ORAL at 14:59

## 2019-09-05 RX ADMIN — ACETAMINOPHEN 1000 MG: 10 INJECTION, SOLUTION INTRAVENOUS at 19:38

## 2019-09-05 RX ADMIN — HYDROMORPHONE HYDROCHLORIDE 2 MG: 2 TABLET ORAL at 19:38

## 2019-09-05 RX ADMIN — EPHEDRINE SULFATE 10 MG: 50 INJECTION, SOLUTION INTRAVENOUS at 09:13

## 2019-09-05 RX ADMIN — Medication 2 G: at 08:00

## 2019-09-05 RX ADMIN — Medication 3 AMPULE: at 07:05

## 2019-09-05 NOTE — PROGRESS NOTES
Care Management Interventions  PCP Verified by CM: Yes  Transition of Care Consult (CM Consult): [address]: Yes  Physical Therapy Consult: Yes  Current Support Network: Lives with Spouse  Plan discussed with Pt/Family/Caregiver: Yes  Discharge Location  Discharge Placement: Home with home health    SW met with pt in Prehab and postop. Pt has [facility] -order and F2F completed. Pt needs a BSC and RW. DME ordered and delivered from [facility]  No additional needs identified at this time.    Lynda Lawson

## 2019-09-05 NOTE — PROGRESS NOTES
[facility]  Face to Face Encounter    Patients Name: Marylene Nettle    YOB: 1945    Ordering Physician: Shakila Wagner    Primary Diagnosis: Unilateral primary osteoarthritis, right hip [M16.11]  Arthritis of right hip [M16.11]    Date of Face to Face:   9/5/2019                                  Face to Face Encounter findings are related to primary reason for home care:   yes. 1. I certify that the patient needs intermittent care as follows: physical therapy: strengthening and stretching/ROM    2. I certify that this patient is homebound, that is: 1) patient requires the use of a walker device, special transportation, or assistance of another to leave the home; or 2) patient's condition makes leaving the home medically contraindicated; and 3) patient has a normal inability to leave the home and leaving the home requires considerable and taxing effort. Patient may leave the home for infrequent and short duration for medical reasons, and occasional absences for non-medical reasons. Homebound status is due to the following functional limitations: Patient currently under activity restrictions secondary to recent surgical procedure, this hinders their ability to safely leave the home. 3. I certify that this patient is under my care and that I, or a nurse practitioner or  [id number], or clinical nurse specialist, or certified nurse midwife, working with me, had a Face-to-Face Encounter that meets the physician Face-to-Face Encounter requirements. The following are the clinical findings from the [address] [place] [address] encounter that support the need for skilled services and is a summary of the encounter: progress notes     See attached progess note      EFREM Coreas  9/5/2019      THE FOLLOWING TO BE COMPLETED BY THE COMMUNITY PHYSICIAN:    I concur with the findings described above from the [facility] encounter that this patient is homebound and in need of a skilled service.     Certifying Physician: _____________________________________      Printed Certifying Physician Name: _____________________________________    Date: _________________

## 2019-09-05 NOTE — PROGRESS NOTES
TRANSFER - IN REPORT:    Verbal report received from Efraín, [address] on Marce Hoskins  being received from PACU for routine post - op      Report consisted of patients Situation, Background, Assessment and   Recommendations(SBAR). Information from the following report(s) Kardex, OR Summary, Intake/Output and MAR was reviewed with the receiving nurse. Opportunity for questions and clarification was provided. Assessment completed upon patients arrival to unit and care assumed.

## 2019-09-05 NOTE — PROGRESS NOTES
09/05/19 1433   Oxygen Therapy   O2 Sat (%) 95 %   Pulse via Oximetry 91 beats per minute   O2 Device Room air   Patient achieved  1750    Ml/sec on IS. Patient encouraged to do 10 breaths every hour while awake-patient agreed and demonstrated. No shortness of breath or distress noted. BS are clear b/l. Joint Camp notes reviewed- Sat monitor #07 placed at bedside.

## 2019-09-05 NOTE — ROUTINE PROCESS
TRANSFER - OUT REPORT:    Verbal report given to DENISE on Luz Darden  being transferred to [facility] for routine post - op       Report consisted of patients Situation, Background, Assessment and   Recommendations(SBAR). Information from the following report(s) SBAR, Kardex, OR Summary, Procedure Summary, Intake/Output, MAR, Recent Results and Cardiac Rhythm SINUS RHYTHM was reviewed with the receiving nurse. Lines:   Peripheral IV 09/05/19 Left Hand (Active)   Site Assessment Clean, dry, & intact 9/5/2019  9:42 AM   Phlebitis Assessment 0 9/5/2019  9:42 AM   Infiltration Assessment 0 9/5/2019  9:42 AM   Dressing Status Clean, dry, & intact 9/5/2019  9:42 AM   Dressing Type Transparent;Tape 9/5/2019  9:42 AM   Hub Color/Line Status Patent; Infusing;Pink 9/5/2019  9:42 AM   Action Taken Blood drawn 9/5/2019  7:07 AM        Opportunity for questions and clarification was provided.       Patient transported with:   O2 @ 0 liters

## 2019-09-05 NOTE — PROGRESS NOTES
Problem: Mobility Impaired (Adult and Pediatric)  Goal: *Acute Goals and Plan of Care (Insert Text)  Description  GOALS (1-4 days):  (1.)Ms. Felix Sharma will move from supine to sit and sit to supine  in bed with STAND BY ASSIST.    (2.)Ms. Felix Sharma will transfer from bed to chair and chair to bed with STAND BY ASSIST using the least restrictive device. (3.)Ms. Felix Sharma will ambulate with STAND BY ASSIST for 200 feet with the least restrictive device. (4.)Ms. Felix Sharma will ambulate up/down 3 steps with right railing with MINIMAL ASSIST with no device. ________________________________________________________________________________________________   Outcome: Progressing Towards Goal     PHYSICAL THERAPY JOINT CAMP HALEIGH: Initial Assessment 9/5/2019  INPATIENT: Hospital Day: 1  [place] / Product Type: Medicare /      NAME/AGE/GENDER: Debbie Armas is a 69 y.o. female   PRIMARY DIAGNOSIS:  Unilateral primary osteoarthritis, right hip [M16.11]   Procedure(s) and Anesthesia Type:     * RIGHT TOTAL HIP ARTHROPLASTY DEPUY - Spinal (Right)  ICD-10: Treatment Diagnosis:    Pain in Right Hip (M25.551)  Stiffness of Right Hip, Not elsewhere classified (M25.651)  Difficulty in walking, Not elsewhere classified (R26.2)  Other abnormalities of gait and mobility (R26.89)      ASSESSMENT:     Ms. Felix Sharma presents with limited proprioception and functional mobility S/P R HALEIGH. Patient still experiencing decreased sensation in B LEs from spinal which limited proprioception and gait distance. Patient urinated in bed and on floor during bed mobility and transfers. Transferred to [facility] for toileting, cleaning and changing of gown and socks and donning of underwear. Ambulated 5' with RW and min assist X 2 to recliner. Performed B LE exercises while up in recliner. Patient should have no difficulty increasing gait distance once spinal wears off. Plans to discharge home with support of spouse.      This section established at most recent assessment   PROBLEM LIST (Impairments causing functional limitations):  Decreased Strength  Decreased ADL/Functional Activities  Decreased Transfer Abilities  Decreased Ambulation Ability/Technique  Decreased Balance  Increased Pain  Decreased Activity Tolerance  Decreased Motley with Home Exercise Program   INTERVENTIONS PLANNED: (Benefits and precautions of physical therapy have been discussed with the patient.)  bed mobility  gait training  home exercise program (HEP)  Range of Motion: active/assisted/passive  Therapeutic Activities  therapeutic exercise/strengthening  transfer training  Group Therapy     TREATMENT PLAN: Frequency/Duration: Follow patient BID for duration of hospital stay to address above goals. Rehabilitation Potential For Stated Goals: Good     RECOMMENDED REHABILITATION/EQUIPMENT: (at time of discharge pending progress): Continue Skilled Therapy and Home Health: Physical Therapy. HISTORY:   History of Present Injury/Illness (Reason for Referral):  S/P R HALEIGH  Past Medical History/Comorbidities:   Ms. Lou Lopez  has a past medical history of Agatston coronary artery calcium score less than 100 (5/2014), Allergic rhinitis (1/22/2014), Arthritis, Colon polyps (1/2015), H/O cardiovascular stress test (5/2014), H/O mammogram (7/2013), Hematuria, HTN (hypertension) (1/22/2014), Hypercholesterolemia, Hypothyroidism (1/22/2014), Otitis externa, Otitis media, Pap smear for cervical cancer screening (7/2013), Thyroid disease, and URI (upper respiratory infection).  She also has no past medical history of Adverse effect of anesthesia, Aneurysm ([address] Arrhythmia, Asthma, Autoimmune disease ([address] CAD (coronary artery disease), Cancer ([address] Chronic kidney disease, Chronic obstructive pulmonary disease ([address] Chronic pain, Coagulation disorder ([address] Diabetes ([address] Difficult intubation, Endocarditis, GERD (gastroesophageal reflux disease), Heart failure ([facility] Ill-defined condition, Liver disease, Malignant hyperthermia due to anesthesia, Morbid obesity ([facility] [address] Nausea & vomiting, Nicotine vapor product user, Non-nicotine vapor product user, Pseudocholinesterase deficiency, Psychiatric disorder, PUD (peptic ulcer disease), Rheumatic fever, Seizures ([facility] [address] Sleep apnea, Stroke ([facility] [address] or Thromboembolus ([facility] [address] Ms. Ethan Eller  has a past surgical history that includes hx colonoscopy (1/2015). Social History/Living Environment:   Home Environment: Private residence  # Steps to Enter: 3  Rails to Enter: No  One/Two Story Residence: One story  Living Alone: No  Support Systems: Spouse/Significant Other/Partner  Patient Expects to be Discharged to[de-identified] Private residence  Current DME Used/Available at Home: None  Tub or Shower Type: Shower  Prior Level of Function/Work/Activity:  independent   Number of Personal Factors/Comorbidities that affect the Plan of Care: 0: LOW COMPLEXITY   EXAMINATION:   Most Recent Physical Functioning:      Gross Assessment  AROM: Within functional limits(for L LE)  Strength: Within functional limits(for L LE)  Sensation: Impaired(due to spinal)                     Bed Mobility  Supine to Sit: Minimum assistance  Sit to Supine: (left up in chair)    Transfers  Sit to Stand: Minimum assistance  Stand to Sit: Minimum assistance  Bed to Chair: Minimum assistance; Additional time    Balance  Sitting: Intact  Standing: Pull to stand; With support              Weight Bearing Status  Right Side Weight Bearing: As tolerated  Distance (ft): 5 Feet (ft)  Ambulation - Level of Assistance: Minimal assistance;Assist x2  Assistive Device: Walker, rolling  Speed/Leonarda: Slow  Stance: Right decreased  Gait Abnormalities: Antalgic;Decreased step clearance;Shuffling gait  Interventions: Manual cues; Safety awareness training;Verbal cues     Braces/Orthotics: none    Right Hip Cold  Type: Cold/ice packs      Body Structures Involved:  Bones  Joints  Muscles Body Functions Affected:  Neuromusculoskeletal  Movement Related Activities and Participation Affected:  General Tasks and Demands  Mobility   Number of elements that affect the Plan of Care: 3: MODERATE COMPLEXITY   CLINICAL PRESENTATION:   Presentation: Stable and uncomplicated: LOW COMPLEXITY   CLINICAL DECISION MAKING:   [facility] AM-PAC 6 Clicks   Basic Mobility Inpatient Short Form  How much difficulty does the patient currently have. .. Unable A Lot A Little None   1. Turning over in bed (including adjusting bedclothes, sheets and blankets)? ? 1   ? 2   ? 3   ? 4   2. Sitting down on and standing up from a chair with arms ( e.g., wheelchair, bedside commode, etc.)   ? 1   ? 2   ? 3   ? 4   3. Moving from lying on back to sitting on the side of the bed?   ? 1   ? 2   ? 3   ? 4   How much help from another person does the patient currently need. .. Total A Lot A Little None   4. Moving to and from a bed to a chair (including a wheelchair)? ? 1   ? 2   ? 3   ? 4   5. Need to walk in hospital room? ? 1   ? 2   ? 3   ? 4   6. Climbing 3-5 steps with a railing? ? 1   ? 2   ? 3   ? 4   © 2007, Trustees of [facility], under license to [employer]. All rights reserved     Score:  Initial: 16 Most Recent: X (Date: -- )    Interpretation of Tool:  Represents activities that are increasingly more difficult (i.e. Bed mobility, Transfers, Gait). Medical Necessity:     Patient demonstrates good   rehab potential due to higher previous functional level. Reason for Services/Other Comments:  Patient continues to require present interventions due to patient's inability to perform exercises and functional mobility independently   .    Use of outcome tool(s) and clinical judgement create a POC that gives a: Clear prediction of patient's progress: LOW COMPLEXITY            TREATMENT:   (In addition to Assessment/Re-Assessment sessions the following treatments were rendered)     Pre-treatment Symptoms/Complaints: R hip pain; decreased sensation B LEs  Pain: Initial:   Pain Intensity 1: 3  Pain Location 1: Hip  Pain Orientation 1: Right  Pain Intervention(s) 1: Cold pack, Repositioned  Post Session:  3     Therapeutic Exercise: (10 Minutes):  Exercises per grid below to improve mobility and strength. Required minimal verbal and manual cues to perform exercises correctly . Date:  9/5/19 Date:   Date:     ACTIVITY/EXERCISE AM PM AM PM AM PM   GROUP THERAPY  ?  ?  ?  ?  ?  ? Ankle Pumps  10       Quad Sets  10       Gluteal Sets  10       Hip ABd/ADduction  10       Straight Leg Raises         Knee Slides  10       Short Arc Quads         Long Arc Quads         Chair Slides                  B = bilateral; AA = active assistive; A = active; P = passive      Treatment/Session Assessment:     Response to Treatment:  tolerated well. Education:  ? Home Exercises  ? Fall Precautions  ? Hip Precautions ? D/C Instruction Review  ? Knee/Hip Prosthesis Review  ? Walker Management/Safety ? Adaptive Equipment as Needed       Interdisciplinary Collaboration:   Physical Therapist  Occupational Therapist  Registered Nurse  Certified Nursing Assistant/Patient Care Technician    After treatment position/precautions:   Up in chair  Bed/Chair-wheels locked  Call light within reach  RN notified    Compliance with Program/Exercises: Compliant most of the time, Will assess as treatment progresses. Recommendations/Intent for next treatment session:  Treatment next visit will focus on increasing Ms. Renteria's independence with bed mobility, transfers, gait training, strength/ROM exercises, modalities for pain, and patient education.       Total Treatment Duration:  PT Patient Time In/Time Out  Time In: 1325  Time Out: Braeden 18, PT

## 2019-09-05 NOTE — PROGRESS NOTES
Problem: Self Care Deficits Care Plan (Adult)  Goal: *Acute Goals and Plan of Care (Insert Text)  Description  GOALS:   DISCHARGE GOALS (in preparation for going home/rehab):  3 days  1. Ms. Delphine Byrd will perform one lower body dressing activity with minimal assistance with adaptive equipment to demonstrate improved functional mobility and safety. 2.  Ms. Delphine Byrd will perform one lower body bathing activity with minimal  assistance with adaptive equipment to demonstrate improved functional mobility and safety. 3.  Ms. Delphine Byrd will perform toileting/toilet transfer with contact guard assistance with adaptive equipment to demonstrate improved functional mobility and safety. 4.  Ms. Delphine Byrd will perform shower transfer with contact guard assistance with adaptive equipment to demonstrate improved functional mobility and safety. 5.  Ms. Delphine Byrd will state HALEIGH precautions with two verbal cues to demonstrate improved functional mobility and safety. \       JOINT CAMP OCCUPATIONAL THERAPY HALEIGH: Initial Assessment and Daily Note 9/5/2019  INPATIENT: Hospital Day: 1  [place] / Product Type: Medicare /      NAME/AGE/GENDER: Fara Suh is a 69 y.o. female   PRIMARY DIAGNOSIS:  Unilateral primary osteoarthritis, right hip [M16.11]   Procedure(s) and Anesthesia Type:     * RIGHT TOTAL HIP ARTHROPLASTY DEPUY - Spinal (Right)  ICD-10: Treatment Diagnosis:    Pain in Right Hip (M25.551)  Stiffness of Right Hip, Not elsewhere classified (M25.651)      ASSESSMENT:     Ms. Delphine Byrd is s/p right HALEIGH and presents with decreased weight bearing on R LE and decreased independence with functional mobility and activities of daily living as compared to prior level of function and safety. Patient would benefit from skilled Occupational Therapy to maximize independence and safety with self-care task and functional mobility.   Pt would also benefit from education on lower body adaptive equipment and hip precautions post-surgery in preparation for going home with Spouse. Patient able to don underwear at [facility]. SPT from bed to [facility] to recliner using a rolling walker. Should progress well with ADL's tomorrow. This section established at most recent assessment   PROBLEM LIST (Impairments causing functional limitations):  Decreased Strength  Decreased ADL/Functional Activities  Decreased Transfer Abilities  Increased Pain  Increased Fatigue  Decreased Flexibility/Joint Mobility  Decreased Knowledge of Precautions   INTERVENTIONS PLANNED: (Benefits and precautions of occupational therapy have been discussed with the patient.)  Activities of daily living training  Adaptive equipment training  Balance training  Clothing management  Donning&doffing training  Theraputic activity     TREATMENT PLAN: Frequency/Duration: Follow patient 1-2tx to address above goals. Rehabilitation Potential For Stated Goals: Excellent     RECOMMENDED REHABILITATION/EQUIPMENT: (at time of discharge pending progress): Continue Skilled Therapy. OCCUPATIONAL PROFILE AND HISTORY:   History of Present Injury/Illness (Reason for Referral): Pt presents this date s/p (Right) HALEIGH. Past Medical History/Comorbidities:   Ms. Alexandrea Gaitan  has a past medical history of Agatston coronary artery calcium score less than 100 (5/2014), Allergic rhinitis (1/22/2014), Arthritis, Colon polyps (1/2015), H/O cardiovascular stress test (5/2014), H/O mammogram (7/2013), Hematuria, HTN (hypertension) (1/22/2014), Hypercholesterolemia, Hypothyroidism (1/22/2014), Otitis externa, Otitis media, Pap smear for cervical cancer screening (7/2013), Thyroid disease, and URI (upper respiratory infection).  She also has no past medical history of Adverse effect of anesthesia, Aneurysm ([address] Arrhythmia, Asthma, Autoimmune disease ([address] CAD (coronary artery disease), Cancer ([address] Chronic kidney disease, Chronic obstructive pulmonary disease ([address] Chronic pain, Coagulation disorder ([facility] Diabetes ([facility] [address] Difficult intubation, Endocarditis, GERD (gastroesophageal reflux disease), Heart failure ([facility] [address] Ill-defined condition, Liver disease, Malignant hyperthermia due to anesthesia, Morbid obesity ([facility] Nausea & vomiting, Nicotine vapor product user, Non-nicotine vapor product user, Pseudocholinesterase deficiency, Psychiatric disorder, PUD (peptic ulcer disease), Rheumatic fever, Seizures ([facility] [address] [address] Sleep apnea, Stroke ([facility] [address] or Thromboembolus ([facility] [address] [address] Ms. Juan Alberto Montiel  has a past surgical history that includes hx colonoscopy (1/2015). Social History/Living Environment:   Home Environment: Private residence  One/Two Story Residence: One story  Living Alone: No  Support Systems: Family member(s)  Patient Expects to be Discharged to[de-identified] Private residence  Current DME Used/Available at Home: [employer] or Shower Type: Shower  Prior Level of Function/Work/Activity:  Independent prior. Number of Personal Factors/Comorbidities that affect the Plan of Care: Brief history (0):  LOW COMPLEXITY   ASSESSMENT OF OCCUPATIONAL PERFORMANCE[de-identified]   Most Recent Physical Functioning:   Balance  Sitting: Intact  Standing: With support                    Coordination  Fine Motor Skills-Upper: Left Intact; Right Intact  Gross Motor Skills-Upper: Left Intact; Right Intact         Mental Status  Neurologic State: Alert  Orientation Level: Oriented X4  Cognition: Appropriate decision making                Basic ADLs (From Assessment) Complex ADLs (From Assessment)   Basic ADL  Feeding: Independent  Oral Facial Hygiene/Grooming: Setup  Bathing: Minimum assistance  Upper Body Dressing: Setup  Lower Body Dressing: Moderate assistance  Toileting: Moderate assistance     Grooming/Bathing/Dressing Activities of Daily Living                       Functional Transfers  Toilet Transfer : Minimum assistance  Shower Transfer:  Moderate assistance     Bed/Mat Mobility  Supine to Sit: Minimum assistance  Sit to Stand: Minimum assistance  Stand to Sit: Minimum assistance  Bed to Chair: Minimum assistance         Physical Skills Involved:  Range of Motion  Balance  Strength Cognitive Skills Affected (resulting in the inability to perform in a timely and safe manner):  [facility]  Psychosocial Skills Affected:  WFL    Number of elements that affect the Plan of Care: 1-3:  LOW COMPLEXITY   CLINICAL DECISION MAKIN[address] [place] [address] AM-PAC 6 Clicks   Daily Activity Inpatient Short Form  How much help from another person does the patient currently need. .. Total A Lot A Little None   1. Putting on and taking off regular lower body clothing? ? 1   ? 2   ? 3   ? 4   2. Bathing (including washing, rinsing, drying)? ? 1   ? 2   ? 3   ? 4   3. Toileting, which includes using toilet, bedpan or urinal?   ? 1   ? 2   ? 3   ? 4   4. Putting on and taking off regular upper body clothing? ? 1   ? 2   ? 3   ? 4   5. Taking care of personal grooming such as brushing teeth? ? 1   ? 2   ? 3   ? 4   6. Eating meals? ? 1   ? 2   ? 3   ? 4   © , Trustees of [address] [place] [address], under license to [employer]. All rights reserved     Score:  Initial: 18 Most Recent: X (Date: -- )    Interpretation of Tool:  Represents activities that are increasingly more difficult (i.e. Bed mobility, Transfers, Gait). Medical Necessity:     Skilled intervention continues to be required due to Deficits listed abvoe. Reason for Services/Other Comments:  Patient continues to require skilled intervention due to new HALEIHG   .    Use of outcome tool(s) and clinical judgement create a POC that gives a: MODERATE COMPLEXITY            TREATMENT:   (In addition to Assessment/Re-Assessment sessions the following treatments were rendered)     Pre-treatment Symptoms/Complaints:    Pain: Initial:   Pain Intensity 1: 3  Post Session:  3     Self Care: (10): Procedure(s) (per grid) utilized to improve and/or restore self-care/home management as related to dressing, bathing, toileting and grooming. Required minimal verbal and tactile cueing to facilitate activities of daily living skills. Initial evaluation 5 mintues. Treatment/Session Assessment:     Response to Treatment:  Good, sitting up in recliner. Education:  ? Home Exercises  ? Fall Precautions  ? Hip Precautions ? Going Home Video  ? Knee/Hip Prosthesis Review  ? Walker Management/Safety ? Adaptive Equipment as Needed       Interdisciplinary Collaboration:   Physical Therapist  Occupational Therapist  Registered Nurse    After treatment position/precautions:   Up in chair  Bed/Chair-wheels locked  Caregiver at bedside  Call light within reach  RN notified     Compliance with Program/Exercises: Compliant all of the time, Will assess as treatment progresses. Recommendations/Intent for next treatment session:  Treatment next visit will focus on increasing Ms. Renteria's independence with bed mobility, transfers, self care, functional mobility, modalities for pain, and patient education.       Total Treatment Duration:  OT Patient Time In/Time Out  Time In: 1310  Time Out: 1515 [facility],

## 2019-09-05 NOTE — ANESTHESIA PROCEDURE NOTES
Spinal Block    Start time: 9/5/2019 8:07 AM  End time: 9/5/2019 8:12 AM  Performed by: Maria Elena Chavez MD  Authorized by: Maria Elena Chavez MD     Pre-procedure:   Indications: primary anesthetic  Preanesthetic Checklist: patient identified, risks and benefits discussed, anesthesia consent, patient being monitored and timeout performed    Timeout Time: 08:07          Spinal Block:   Patient Position:  Seated  Prep Region:  Lumbar  Prep: chlorhexidine and patient draped      Location:  L3-4  Technique:  Single shot    Local Dose (mL):  3    Needle:   Needle Type:  Pencan  Needle Gauge:  25 G  Attempts:  1      Events: CSF confirmed, no blood with aspiration and no paresthesia        Assessment:  Insertion:  Uncomplicated  Patient tolerance:  Patient tolerated the procedure well with no immediate complications

## 2019-09-05 NOTE — ANESTHESIA PREPROCEDURE EVALUATION
Relevant Problems   No relevant active problems       Anesthetic History   No history of anesthetic complications            Review of Systems / Medical History  Patient summary reviewed and pertinent labs reviewed    Pulmonary                   Neuro/Psych              Cardiovascular    Hypertension                   GI/Hepatic/Renal  Within defined limits              Endo/Other      Hypothyroidism  Arthritis     Other Findings              Physical Exam    Airway  Mallampati: II  TM Distance: 4 - 6 cm  Neck ROM: normal range of motion   Mouth opening: Normal     Cardiovascular    Rhythm: regular  Rate: normal      Pertinent negatives: No murmur   Dental  No notable dental hx       Pulmonary  Breath sounds clear to auscultation               Abdominal         Other Findings            Anesthetic Plan    ASA: 2  Anesthesia type: spinal          Induction: Intravenous  Anesthetic plan and risks discussed with: Patient

## 2019-09-05 NOTE — ANESTHESIA POSTPROCEDURE EVALUATION
Procedure(s):  RIGHT TOTAL HIP ARTHROPLASTY DEPUY.     spinal    Anesthesia Post Evaluation      Multimodal analgesia: multimodal analgesia used between 6 hours prior to anesthesia start to PACU discharge  Patient location during evaluation: bedside  Patient participation: complete - patient participated  Level of consciousness: awake and responsive to light touch  Pain management: adequate  Airway patency: patent  Anesthetic complications: no  Cardiovascular status: acceptable, hemodynamically stable, blood pressure returned to baseline and stable  Respiratory status: acceptable, unassisted, spontaneous ventilation and nonlabored ventilation  Hydration status: acceptable  Post anesthesia nausea and vomiting:  controlled      Vitals Value Taken Time   /63 9/5/2019 10:08 AM   Temp 36.8 °C (98.3 °F) 9/5/2019  9:43 AM   Pulse 102 9/5/2019 10:08 AM   Resp 16 9/5/2019 10:08 AM   SpO2 95 % 9/5/2019 10:08 AM

## 2019-09-05 NOTE — OP NOTES
[facility]  Total Hip Procedure Note  Patrick Bran   : 1945  Medical Record [id number]      Pre-operative Diagnosis:  Unilateral primary osteoarthritis, right hip [M16.11]  Post-operative Diagnosis: Unilateral primary osteoarthritis, right hip [M16.11]    Surgeon: Armen Layne MD  Assistant:Yovanny Ruano PA-C    Anesthesia: Spinal      Procedure: Total Hip Arthroplasty   The complexity of the total joint surgery requires the use of a first assistant for positioning, retraction and assistance in closure. The patient's Body mass index is 26.39 kg/m²., BMI's greater then 40 make surgical exposure and retraction extremely difficult and increase operative time. Nikolas Champion was brought to the operating room and positioned on the operating table. She was anesthestized . IV antibiotics per CMS protocol were administered. Prior to the incision being made a timeout was called identifying the patient, procedure ,operative side and surgeon. Nikolas Champion was positioned in the lateral decubitus position with the  right  side up. The limb was prepped and draped in the usual sterile fashion. The direct lateral approach was utilized to expose the hip. The incision was carried through the subcutaneous tissue and underlying fascia with homeostasis obtained using the bovie cauterization. A Charnley retractor was inserted. The abductors were taken down at the junction of the anterior and middle third. The sciatic nerve was palpated and identified. Following comparison of leg lengths, the femoral head was dislocated. The neck was osteotomized in the appropriate position just above the lesser trochanteric region. The acetabular retractor was placed and the appropriate capsulotomy performed. Soft tissue was removed from the acetabulum. The acetabulum was sequentially reamed. Using trial components the acetabulum was sized to a 56 mm acetabular cup.       Utilizing the femoral retractor, the canal was prepared with appropriate laterization and reamed with the starter reamer. The canal was then broached progressively to size 4. The calcar planar was untilized. A trial reduction with a size +5.0 neck length was utilized. The Head size was 36mm. This was found to be the most stable to flexion greater than 90 degrees and an internal and external rotation. Limb lengths were found to be equilibrated and with appropriate stability as mentioned above. All trial components were removed. The cementless permanent stem was impacted into place. A trial reduction was performed and the appropiate neck length was selected. A permanent 36mm femoral head was impacted into place. Cameron Renteria's hip was reduced and the stability was as mentioned as above. The betadine lavage protocol was used. The sciatic nerve was palpated and noted to be intact. The abductors were repaired through drill holes in the greater trochanter. The remainder was closed in layers with a Zipline closure for the skin. The patient was then rolled to a supine position. The sponge and needle counts were correct. The patient tolerated the procedure without difficulty and left the operating room in satisfactory condition. EBL:300cc  Additional Findings: severe DJD  Implants:   Implant Name Type Inv.  Item Serial No.  Lot No. LRB No. Used Action   CUP ACET PINN 100 W/ 56 --  - [id number]  CUP ACET PINN 100 W/ 56 --   [facility] [employer] [id number] Right 1 Implanted   LINER ACET PINN NEUT [id number] -- [employer]TR[employer] - [id number]  LINER ACET PINN NEUT [id number] -- ALTRX  [facility] [employer] [id number] Right 1 Implanted   PLUG ACET APCL H ELIM POS STP --  - [id number]  PLUG ACET APCL H ELIM POS STP --   [facility] [employer] [id number] Right 1 Implanted   STEM FEM TAPR SZ4 STD OFFSET -- ACTIS - [id number]  STEM FEM TAPR SZ4 STD OFFSET -- ACTIS  [facility] [employer] [id number] Right 1 Implanted   HEAD FEM S-ROM 36MM +5MM NK -- Yael Friends - [id number]  HEAD FEM S-ROM 36MM +5MM NK -- Yael Friends  [facility] [employer] [id number] Right 1 Implanted     Signed By: Jose Medeiros MD

## 2019-09-05 NOTE — PERIOP NOTES
Betadine lavage:  17.5cc of betadine lot # [id number] , exp. Date  03/2021,  in 500cc of . 9NS Lot # [id number] , exp.  Date : 01/2022

## 2019-09-05 NOTE — INTERVAL H&P NOTE
H&P Update:  Marylene Nettle was seen and examined. History and physical has been reviewed. The patient has been examined.  There have been no significant clinical changes since the completion of the originally dated History and Physical.

## 2019-09-06 VITALS
OXYGEN SATURATION: 96 % | DIASTOLIC BLOOD PRESSURE: 78 MMHG | HEART RATE: 72 BPM | HEIGHT: 67 IN | BODY MASS INDEX: 26.06 KG/M2 | RESPIRATION RATE: 20 BRPM | WEIGHT: 166 LBS | SYSTOLIC BLOOD PRESSURE: 122 MMHG | TEMPERATURE: 98 F

## 2019-09-06 PROBLEM — Z96.641 H/O TOTAL HIP ARTHROPLASTY, RIGHT: Status: ACTIVE | Noted: 2019-09-06

## 2019-09-06 PROCEDURE — 90686 IIV4 VACC NO PRSV 0.5 ML IM: CPT | Performed by: ORTHOPAEDIC SURGERY

## 2019-09-06 PROCEDURE — 97116 GAIT TRAINING THERAPY: CPT

## 2019-09-06 PROCEDURE — 97150 GROUP THERAPEUTIC PROCEDURES: CPT

## 2019-09-06 PROCEDURE — 90471 IMMUNIZATION ADMIN: CPT

## 2019-09-06 PROCEDURE — 97110 THERAPEUTIC EXERCISES: CPT

## 2019-09-06 PROCEDURE — 74011250636 HC RX REV CODE- 250/636: Performed by: ORTHOPAEDIC SURGERY

## 2019-09-06 PROCEDURE — 74011250637 HC RX REV CODE- 250/637: Performed by: ORTHOPAEDIC SURGERY

## 2019-09-06 PROCEDURE — 97535 SELF CARE MNGMENT TRAINING: CPT

## 2019-09-06 RX ORDER — HYDROMORPHONE HYDROCHLORIDE 2 MG/1
2-4 TABLET ORAL
Status: DISCONTINUED | OUTPATIENT
Start: 2019-09-06 | End: 2019-09-06 | Stop reason: HOSPADM

## 2019-09-06 RX ORDER — PROMETHAZINE HYDROCHLORIDE 25 MG/1
25 TABLET ORAL
Qty: 40 TAB | Refills: 0 | Status: SHIPPED | OUTPATIENT
Start: 2019-09-06 | End: 2020-05-20 | Stop reason: ALTCHOICE

## 2019-09-06 RX ORDER — ASPIRIN 81 MG/1
81 TABLET ORAL EVERY 12 HOURS
Qty: 60 TAB | Refills: 0 | Status: SHIPPED | OUTPATIENT
Start: 2019-09-06 | End: 2019-10-06

## 2019-09-06 RX ORDER — HYDROMORPHONE HYDROCHLORIDE 2 MG/1
2-4 TABLET ORAL
Qty: 60 TAB | Refills: 0 | Status: SHIPPED | OUTPATIENT
Start: 2019-09-06 | End: 2019-09-16 | Stop reason: ALTCHOICE

## 2019-09-06 RX ADMIN — INFLUENZA VIRUS VACCINE 0.5 ML: 15; 15; 15; 15 SUSPENSION INTRAMUSCULAR at 13:55

## 2019-09-06 RX ADMIN — AMLODIPINE BESYLATE 5 MG: 5 TABLET ORAL at 09:00

## 2019-09-06 RX ADMIN — SENNOSIDES AND DOCUSATE SODIUM 2 TABLET: 8.6; 5 TABLET ORAL at 09:00

## 2019-09-06 RX ADMIN — Medication 1 AMPULE: at 08:59

## 2019-09-06 RX ADMIN — CELECOXIB 200 MG: 200 CAPSULE ORAL at 09:00

## 2019-09-06 RX ADMIN — HYDROMORPHONE HYDROCHLORIDE 2 MG: 2 TABLET ORAL at 12:06

## 2019-09-06 RX ADMIN — LEVOTHYROXINE SODIUM 25 MCG: 50 TABLET ORAL at 06:00

## 2019-09-06 RX ADMIN — ACETAMINOPHEN 1000 MG: 500 TABLET, FILM COATED ORAL at 12:06

## 2019-09-06 RX ADMIN — ASPIRIN 81 MG: 81 TABLET, COATED ORAL at 09:00

## 2019-09-06 RX ADMIN — HYDROMORPHONE HYDROCHLORIDE 2 MG: 2 TABLET ORAL at 06:00

## 2019-09-06 RX ADMIN — ACETAMINOPHEN 1000 MG: 500 TABLET, FILM COATED ORAL at 06:00

## 2019-09-06 NOTE — PROGRESS NOTES
2019         Post Op day: 1 Day Post-Op     Admit Date: 2019  Admit Diagnosis: Unilateral primary osteoarthritis, right hip [M16.11]  Arthritis of right hip [M16.11]        Subjective: Doing well, No complaints, No SOB, No Chest Pain, No Nausea or Vomiting     Objective:   Vital Signs are Stable, No Acute Distress, Alert and Oriented, Dressing is Dry,  Neurovascular exam is normal.     Assessment / Plan :  Patient Active Problem List   Diagnosis Code    Allergic rhinitis J30.9    HTN (hypertension) I10    Hypothyroidism E03.9    Hypercholesterolemia E78.00    Diverticulosis of large intestine without hemorrhage K57.30    FH: colon cancer Z80.0    Tubular adenoma of colon D12.6    Cystocele and rectocele with incomplete uterovaginal prolapse N81.2    Primary osteoarthritis involving multiple joints M15.0    Hematuria R31.9    Arthritis of right hip M16.11    H/O total hip arthroplasty, right Z96.641      Patient Vitals for the past 8 hrs:   BP Temp Pulse Resp SpO2   19 0743 111/75 98.2 °F (36.8 °C) 74 18 99 %   19 0329 108/69 98.2 °F (36.8 °C) 80 16 96 %   19 0015 106/70 98.2 °F (36.8 °C) 78 16 96 %    Temp (24hrs), Av °F (36.7 °C), Min:97.5 °F (36.4 °C), Max:98.3 °F (36.8 °C)    Body mass index is 26.39 kg/m².     Lab Results   Component Value Date/Time    HGB 12.9 2019 07:19 PM      Pt seen by and discussed with [employer] today        Signed By: HEIDI Rivers

## 2019-09-06 NOTE — PROGRESS NOTES
Problem: Mobility Impaired (Adult and Pediatric)  Goal: *Acute Goals and Plan of Care (Insert Text)  Description  GOALS (1-4 days):  (1.)Ms. Albino Carreno will move from supine to sit and sit to supine  in bed with STAND BY ASSIST.    (2.)Ms. Albino Carreno will transfer from bed to chair and chair to bed with STAND BY ASSIST using the least restrictive device. (3.)Ms. Albino Carreno will ambulate with STAND BY ASSIST for 200 feet with the least restrictive device. (4.)Ms. Albino Carreno will ambulate up/down 3 steps with right railing with MINIMAL ASSIST with no device. ________________________________________________________________________________________________   Outcome: Progressing Towards Goal     PHYSICAL THERAPY JOINT CAMP [place]: Daily Note and AM 9/6/2019  INPATIENT: Hospital Day: 2  [place] / [place] / Product Type: Medicare /      NAME/AGE/GENDER: Delon Bañuelos is a 69 y.o. female   PRIMARY DIAGNOSIS:  Unilateral primary osteoarthritis, right hip [M16.11]   Procedure(s) and Anesthesia Type:     * RIGHT TOTAL HIP ARTHROPLASTY DEPUY - Spinal (Right)  ICD-10: Treatment Diagnosis:    · Pain in Right Hip (M25.551)  · Stiffness of Right Hip, Not elsewhere classified (M25.651)  · Difficulty in walking, Not elsewhere classified (R26.2)  · Other abnormalities of gait and mobility (R26.89)      ASSESSMENT:     Ms. Albino Carreno presents up in chair on contact and agreeable to therapy. Worked on gait training in the gutierrez with verbal cues making nice progress with gait distance. In room worked on [employer] exercises with verbal cues progressing with repetitions. She stayed up in chair with ice by hip and needs in reach. Hope to progress this afternoon. This section established at most recent assessment   PROBLEM LIST (Impairments causing functional limitations):  1. Decreased Strength  2. Decreased ADL/Functional Activities  3. Decreased Transfer Abilities  4. Decreased Ambulation Ability/Technique  5.  Decreased Balance  6. Increased Pain  7. Decreased Activity Tolerance  8. Decreased [place] with Home Exercise Program   INTERVENTIONS PLANNED: (Benefits and precautions of physical therapy have been discussed with the patient.)  1. bed mobility  2. gait training  3. home exercise program (HEP)  4. Range of Motion: active/assisted/passive  5. Therapeutic Activities  6. therapeutic exercise/strengthening  7. transfer training  8. Group Therapy     TREATMENT PLAN: Frequency/Duration: Follow patient BID for duration of hospital stay to address above goals. Rehabilitation Potential For Stated Goals: Good     RECOMMENDED REHABILITATION/EQUIPMENT: (at time of discharge pending progress): Continue Skilled Therapy and Home Health: Physical Therapy. HISTORY:   History of Present Injury/Illness (Reason for Referral):  S/P R HALEIGH  Past Medical History/Comorbidities:   Ms. Harper Leija  has a past medical history of [place] coronary artery calcium score less than 100 (5/2014), Allergic rhinitis (1/22/2014), Arthritis, Colon polyps (1/2015), H/O cardiovascular stress test (5/2014), H/O mammogram (7/2013), Hematuria, HTN (hypertension) (1/22/2014), Hypercholesterolemia, Hypothyroidism (1/22/2014), Otitis externa, Otitis media, Pap smear for cervical cancer screening (7/2013), Thyroid disease, and URI (upper respiratory infection).  She also has no past medical history of Adverse effect of anesthesia, Aneurysm ([address] Arrhythmia, Asthma, Autoimmune disease ([address] CAD (coronary artery disease), Cancer ([address] Chronic kidney disease, Chronic obstructive pulmonary disease ([address] Chronic pain, Coagulation disorder ([address] Diabetes ([address] Difficult intubation, Endocarditis, GERD (gastroesophageal reflux disease), Heart failure ([address] Ill-defined condition, Liver disease, Malignant hyperthermia due to anesthesia, Morbid obesity ([address] Nausea & vomiting, Nicotine vapor product user, Non-nicotine vapor product user, Pseudocholinesterase deficiency, Psychiatric disorder, PUD (peptic ulcer disease), Rheumatic fever, Seizures ([facility] Sleep apnea, Stroke ([facility] or Thromboembolus ([facility] [address] [facility] Ms. Albino Carreno  has a past surgical history that includes hx colonoscopy (1/2015). Social History/Living Environment:   Home Environment: Private residence  # Steps to Enter: 3  Rails to Enter: No  One/Two Story Residence: One story  Living Alone: No  Support Systems: Spouse/Significant Other/Partner  Patient Expects to be Discharged to[de-identified] Private residence  Current DME Used/Available at Home: None  Tub or Shower Type: Shower  Prior Level of Function/Work/Activity:  independent   Number of Personal Factors/Comorbidities that affect the Plan of Care: 0: LOW COMPLEXITY   EXAMINATION:   Most Recent Physical Functioning:                            Bed Mobility  Supine to Sit: Stand-by assistance    Transfers  Sit to Stand: Stand-by assistance  Stand to Sit: Stand-by assistance  Bed to Chair: Stand-by assistance    Balance  Sitting: Intact  Standing: Intact; With support         Gait Training: Yes    Weight Bearing Status  Right Side Weight Bearing: As tolerated  Distance (ft): 80 Feet (ft)  Ambulation - Level of Assistance: Stand-by assistance;Contact guard assistance  Assistive Device: Walker, rolling  Speed/Leonarda: Pace decreased (<100 feet/min)  Step Length: Left shortened  Stance: Right decreased  Gait Abnormalities: Antalgic;Decreased step clearance  Interventions: Safety awareness training;Verbal cues     Braces/Orthotics: none    Right Hip Cold  Type: Cold/ice packs      Body Structures Involved:  1. Bones  2. Joints  3. Muscles Body Functions Affected:  1. Neuromusculoskeletal  2. Movement Related Activities and Participation Affected:  1. General Tasks and Demands  2.  Mobility   Number of elements that affect the Plan of Care: 3: MODERATE COMPLEXITY   CLINICAL PRESENTATION:   Presentation: Stable and uncomplicated: LOW COMPLEXITY   CLINICAL DECISION MAKING:   [employer] AM-PAC 6 Clicks   Basic Mobility Inpatient Short Form  How much difficulty does the patient currently have. .. Unable A Lot A Little None   1. Turning over in bed (including adjusting bedclothes, sheets and blankets)? ? 1   ? 2   ? 3   ? 4   2. Sitting down on and standing up from a chair with arms ( e.g., wheelchair, bedside commode, etc.)   ? 1   ? 2   ? 3   ? 4   3. Moving from lying on back to sitting on the side of the bed?   ? 1   ? 2   ? 3   ? 4   How much help from another person does the patient currently need. .. Total A Lot A Little None   4. Moving to and from a bed to a chair (including a wheelchair)? ? 1   ? 2   ? 3   ? 4   5. Need to walk in hospital room? ? 1   ? 2   ? 3   ? 4   6. Climbing 3-5 steps with a railing? ? 1   ? 2   ? 3   ? 4   © 2007, Trustees of [address] [place] [address], under license to [employer]. All rights reserved     Score:  Initial: 16 Most Recent: X (Date: -- )    Interpretation of Tool:  Represents activities that are increasingly more difficult (i.e. Bed mobility, Transfers, Gait). Medical Necessity:     · Patient demonstrates good  ·  rehab potential due to higher previous functional level. Reason for Services/Other Comments:  · Patient continues to require present interventions due to patient's inability to perform exercises and functional mobility independently   · . Use of outcome tool(s) and clinical judgement create a POC that gives a: Clear prediction of patient's progress: LOW COMPLEXITY            TREATMENT:   (In addition to Assessment/Re-Assessment sessions the following treatments were rendered)     Pre-treatment Symptoms/Complaints:  none  Pain Initial:   Pain Intensity 1: (no reports of pain)  Post Session:  A little sore after therapy     Therapeutic Exercise: (13 Minutes):  Exercises per grid below to improve mobility and strength. Required minimal verbal and manual cues to perform exercises correctly .       Date:  9/5/19 Date:  9/6/19 Date:     ACTIVITY/EXERCISE AM PM AM PM AM PM   GROUP THERAPY  ?  ?  ?  ?  ?  ? Ankle Pumps  10 15      Quad Sets  10 15      Gluteal Sets  10 15      Hip ABd/ADduction  10 15      Straight Leg Raises         Knee Slides  10 15      Short Arc Quads   15      Long Arc Quads   15      Chair Slides                  B = bilateral; AA = active assistive; A = active; P = passive      Treatment/Session Assessment:     Response to Treatment:  tolerated well. Education:  ? Home Exercises  ? Fall Precautions  ? Hip Precautions ? D/C Instruction Review  ? Hip Prosthesis Review  ? Walker Management/Safety ? Adaptive Equipment as Needed       Interdisciplinary Collaboration:   o Registered Nurse    After treatment position/precautions:   o Up in chair  o Bed/Chair-wheels locked  o Call light within reach    Compliance with Program/Exercises: Compliant all of the time. Recommendations/Intent for next treatment session:  Treatment next visit will focus on increasing Ms. Renteria's independence with bed mobility, transfers, gait training, strength/ROM exercises, modalities for pain, and patient education.       Total Treatment Duration:  PT Patient Time In/Time Out  Time In: 0923  Time Out: [address], PT

## 2019-09-06 NOTE — PROGRESS NOTES
Shift assessment complete. Pt resting quietly in recliner but stated she was about ready to get into bed. IV, dressing clean, dry and intact. Able to dorsi/plantar flex. Dilaudid po given for c/o pain. Will medicate per orders. Call light within reach. IS at bedside. Will continue to monitor.

## 2019-09-06 NOTE — PROGRESS NOTES
Problem: Mobility Impaired (Adult and Pediatric)  Goal: *Acute Goals and Plan of Care (Insert Text)  Description  GOALS (1-4 days):  (1.)Ms. Janan Gilford will move from supine to sit and sit to supine  in bed with STAND BY ASSIST.    (2.)Ms. Janan Gilford will transfer from bed to chair and chair to bed with STAND BY ASSIST using the least restrictive device. (3.)Ms. Janan Gilford will ambulate with STAND BY ASSIST for 200 feet with the least restrictive device. (4.)Ms. Janan Gilford will ambulate up/down 3 steps with right railing with MINIMAL ASSIST with no device. ________________________________________________________________________________________________   Outcome: Progressing Towards Goal     PHYSICAL THERAPY JOINT CAMP HALEIGH: Daily Note and PM 9/6/2019  INPATIENT: Hospital Day: 2  [place] / Product Type: Medicare /      NAME/AGE/GENDER: Kelsea Jarrell is a 69 y.o. female   PRIMARY DIAGNOSIS:  Unilateral primary osteoarthritis, right hip [M16.11]   Procedure(s) and Anesthesia Type:     * RIGHT TOTAL HIP ARTHROPLASTY DEPUY - Spinal (Right)  ICD-10: Treatment Diagnosis:    · Pain in Right Hip (M25.551)  · Stiffness of Right Hip, Not elsewhere classified (M25.651)  · Difficulty in walking, Not elsewhere classified (R26.2)  · Other abnormalities of gait and mobility (R26.89)      ASSESSMENT:     Ms. Janan Gilford presents up in chair on contact and ready for therapy and eager to go home today. Worked on gait training in the gutierrez with verbal cues making progress with distance. In gym worked on [employer] exercises with verbal cues. Reviewed HEP and use of ice along with frequency. She practiced stairs with verbal cues. Walked part of the way back to her room. Left up in chair with ice by hip and needs in reach to await discharge. HHPT for follow up. This section established at most recent assessment   PROBLEM LIST (Impairments causing functional limitations):  1. Decreased Strength  2.  Decreased ADL/Functional Activities  3. Decreased Transfer Abilities  4. Decreased Ambulation Ability/Technique  5. Decreased Balance  6. Increased Pain  7. Decreased Activity Tolerance  8. Decreased Butler with Home Exercise Program   INTERVENTIONS PLANNED: (Benefits and precautions of physical therapy have been discussed with the patient.)  1. bed mobility  2. gait training  3. home exercise program (HEP)  4. Range of Motion: active/assisted/passive  5. Therapeutic Activities  6. therapeutic exercise/strengthening  7. transfer training  8. Group Therapy     TREATMENT PLAN: Frequency/Duration: Follow patient BID for duration of hospital stay to address above goals. Rehabilitation Potential For Stated Goals: Good     RECOMMENDED REHABILITATION/EQUIPMENT: (at time of discharge pending progress): Continue Skilled Therapy and Home Health: Physical Therapy. HISTORY:   History of Present Injury/Illness (Reason for Referral):  S/P R HALEIGH  Past Medical History/Comorbidities:   Ms. Irving Saleem  has a past medical history of Agatston coronary artery calcium score less than 100 (5/2014), Allergic rhinitis (1/22/2014), Arthritis, Colon polyps (1/2015), H/O cardiovascular stress test (5/2014), H/O mammogram (7/2013), Hematuria, HTN (hypertension) (1/22/2014), Hypercholesterolemia, Hypothyroidism (1/22/2014), Otitis externa, Otitis media, Pap smear for cervical cancer screening (7/2013), Thyroid disease, and URI (upper respiratory infection).  She also has no past medical history of Adverse effect of anesthesia, Aneurysm ([address] Arrhythmia, Asthma, Autoimmune disease ([address] CAD (coronary artery disease), Cancer ([address] Chronic kidney disease, Chronic obstructive pulmonary disease ([address] Chronic pain, Coagulation disorder ([address] Diabetes ([address] Difficult intubation, Endocarditis, GERD (gastroesophageal reflux disease), Heart failure ([address] Ill-defined condition, Liver disease, Malignant hyperthermia due to anesthesia, Morbid obesity ([facility] Nausea & vomiting, Nicotine vapor product user, Non-nicotine vapor product user, Pseudocholinesterase deficiency, Psychiatric disorder, PUD (peptic ulcer disease), Rheumatic fever, Seizures ([facility] Sleep apnea, Stroke ([facility] or Thromboembolus ([facility] Ms. Katharine Lynn  has a past surgical history that includes hx colonoscopy (1/2015). Social History/Living Environment:   Home Environment: Private residence  # Steps to Enter: 3  Rails to Enter: No  One/Two Story Residence: One story  Living Alone: No  Support Systems: Spouse/Significant Other/Partner  Patient Expects to be Discharged to[de-identified] Private residence  Current DME Used/Available at Home: None  Tub or Shower Type: Shower  Prior Level of Function/Work/Activity:  independent   Number of Personal Factors/Comorbidities that affect the Plan of Care: 0: LOW COMPLEXITY   EXAMINATION:   Most Recent Physical Functioning:                            Bed Mobility  Supine to Sit: Stand-by assistance    Transfers  Sit to Stand: Supervision;Stand-by assistance  Stand to Sit: Supervision;Stand-by assistance  Bed to Chair: Stand-by assistance    Balance  Sitting: Intact  Standing: Intact; With support         Gait Training: Yes    Weight Bearing Status  Right Side Weight Bearing: As tolerated  Distance (ft): 150 Feet (ft)(and another 100 feet)  Ambulation - Level of Assistance: Stand-by assistance  Assistive Device: Walker, rolling  Speed/Leonarda: Pace decreased (<100 feet/min)  Step Length: Left shortened  Stance: Right decreased  Gait Abnormalities: Antalgic  Number of Stairs Trained: 3  Stairs - Level of Assistance: Stand-by assistance;Contact guard assistance  Rail Use: Left   Interventions: Safety awareness training;Verbal cues     Braces/Orthotics: none    Right Hip Cold  Type: Cold/ice packs      Body Structures Involved:  1. Bones  2. Joints  3. Muscles Body Functions Affected:  1. Neuromusculoskeletal  2.  Movement Related Activities and Participation Affected:  1. General Tasks and Demands  2. Mobility   Number of elements that affect the Plan of Care: 3: MODERATE COMPLEXITY   CLINICAL PRESENTATION:   Presentation: Stable and uncomplicated: LOW COMPLEXITY   CLINICAL DECISION MAKIN[address] [place] [address] AM-PAC 6 Clicks   Basic Mobility Inpatient Short Form  How much difficulty does the patient currently have. .. Unable A Lot A Little None   1. Turning over in bed (including adjusting bedclothes, sheets and blankets)? ? 1   ? 2   ? 3   ? 4   2. Sitting down on and standing up from a chair with arms ( e.g., wheelchair, bedside commode, etc.)   ? 1   ? 2   ? 3   ? 4   3. Moving from lying on back to sitting on the side of the bed?   ? 1   ? 2   ? 3   ? 4   How much help from another person does the patient currently need. .. Total A Lot A Little None   4. Moving to and from a bed to a chair (including a wheelchair)? ? 1   ? 2   ? 3   ? 4   5. Need to walk in hospital room? ? 1   ? 2   ? 3   ? 4   6. Climbing 3-5 steps with a railing? ? 1   ? 2   ? 3   ? 4   © , Trustees of [address] [place] [address], under license to [employer]. All rights reserved     Score:  Initial: 16 Most Recent: X (Date: -- )    Interpretation of Tool:  Represents activities that are increasingly more difficult (i.e. Bed mobility, Transfers, Gait). Medical Necessity:     · Patient demonstrates good  ·  rehab potential due to higher previous functional level. Reason for Services/Other Comments:  · Patient continues to require present interventions due to patient's inability to perform exercises and functional mobility independently   · .    Use of outcome tool(s) and clinical judgement create a POC that gives a: Clear prediction of patient's progress: LOW COMPLEXITY            TREATMENT:   (In addition to Assessment/Re-Assessment sessions the following treatments were rendered)     Pre-treatment Symptoms/Complaints:  none  Pain Initial:   Pain Intensity 1: (no reports of pain) Post Session:  A little sore after therapy     Therapeutic Exercise: (45 Minutes(group)):  Exercises per grid below to improve mobility and strength. Required minimal verbal and manual cues to perform exercises correctly . Gait Training (15 Minutes):  Gait training to improve and/or restore physical functioning as related to mobility and strength. Ambulated 150 Feet (ft)(and another 100 feet) with Stand-by assistance using a Walker, rolling and minimal Safety awareness training;Verbal cues related to their stance phase and stride length to promote proper body alignment and promote proper body posture. Instruction in performance of walker use and gait sequencing to correct stance phase and stride length. Date:  9/5/19 Date:  9/6/19 Date:     ACTIVITY/EXERCISE AM PM AM PM AM PM   GROUP THERAPY  ? ?  ?  X  ?  ? Ankle Pumps  10 15 15     Quad Sets  10 15 15     Gluteal Sets  10 15 15     Hip ABd/ADduction  10 15 15     Straight Leg Raises         Knee Slides  10 15 15     Short Arc Quads   15 15     Long Arc Quads   15 15     Chair Slides                  B = bilateral; AA = active assistive; A = active; P = passive      Treatment/Session Assessment:     Response to Treatment:  tolerated well, making nice progress, home today    Education:  ? Home Exercises  ? Fall Precautions  ? Hip Precautions X D/C Instruction Review  X Hip Prosthesis Review  ? Walker Management/Safety ? Adaptive Equipment as Needed       Interdisciplinary Collaboration:   o Registered Nurse  o Rehabilitation Attendant    After treatment position/precautions:   o Up in chair  o Bed/Chair-wheels locked  o Call light within reach    Compliance with Program/Exercises: Compliant all of the time. Recommendations/Intent for next treatment session:  Treatment next visit will focus on increasing Ms. Renteria's independence with bed mobility, transfers, gait training, strength/ROM exercises, modalities for pain, and patient education.       Total Treatment Duration:  PT Patient Time In/Time Out  Time In: 1300  Time Out: [address], PT

## 2019-09-06 NOTE — PROGRESS NOTES
09/05/19 2136   Oxygen Therapy   O2 Sat (%) 96 %   Pulse via Oximetry 85 beats per minute   O2 Device Room air   Patient placed on continuous sat monitor (#7). Alarms set and data cleared. No distress noted at this time.

## 2019-09-06 NOTE — PROGRESS NOTES
Problem: Self Care Deficits Care Plan (Adult)  Goal: *Acute Goals and Plan of Care (Insert Text)  Description  GOALS:   DISCHARGE GOALS (in preparation for going home/rehab):  3 days  1. Ms. Carrie Lima will perform one lower body dressing activity with minimal assistance with adaptive equipment to demonstrate improved functional mobility and safety. -GOAL MET 9/6/2019   2. Ms. Carrie Lima will perform one lower body bathing activity with minimal  assistance with adaptive equipment to demonstrate improved functional mobility and safety. -GOAL MET 9/6/2019   3. Ms. Carrie Lima will perform toileting/toilet transfer with contact guard assistance with adaptive equipment to demonstrate improved functional mobility and safety. -GOAL MET 9/6/2019   4. Ms. Carrie Lima will perform shower transfer with contact guard assistance with adaptive equipment to demonstrate improved functional mobility and safety. -GOAL MET 9/6/2019   5. Ms. Carrie Lima will state HALEIGH precautions with two verbal cues to demonstrate improved functional mobility and safety. -GOAL MET 9/6/2019   \       JOINT CAMP OCCUPATIONAL THERAPY HALEIGH: Initial Assessment and Daily Note 9/6/2019  INPATIENT: Hospital Day: 2  [place] / Product Type: Medicare /      NAME/AGE/GENDER: Sabino Del Real is a 69 y.o. female   PRIMARY DIAGNOSIS:  Unilateral primary osteoarthritis, right hip [M16.11]   Procedure(s) and Anesthesia Type:     * RIGHT TOTAL HIP ARTHROPLASTY DEPUY - Spinal (Right)  ICD-10: Treatment Diagnosis:    · Pain in Right Hip (M25.551)  · Stiffness of Right Hip, Not elsewhere classified (M25.651)      ASSESSMENT:     Ms. Carrie Lima is s/p Right HALEIGH and presents with decreased weight bearing on R LE and decreased independence with functional mobility and activities of daily living.   Patient completed shower and dressing as charted below in ADL grid and is ambulating with rolling walker and supervision assist.  Patient has met 5/5 goals and plans to return home with good family support. Will do well at home for self cares and transfers during ADL's.  D/C OT for acute deficits. This section established at most recent assessment   PROBLEM LIST (Impairments causing functional limitations):  1. Decreased Strength  2. Decreased ADL/Functional Activities  3. Decreased Transfer Abilities  4. Increased Pain  5. Increased Fatigue  6. Decreased Flexibility/Joint Mobility  7. Decreased Knowledge of Precautions   INTERVENTIONS PLANNED: (Benefits and precautions of occupational therapy have been discussed with the patient.)  1. Activities of daily living training  2. Adaptive equipment training  3. Balance training  4. Clothing management  5. Donning&doffing training  6. Theraputic activity     TREATMENT PLAN: Frequency/Duration: Follow patient 1-2tx to address above goals. Rehabilitation Potential For Stated Goals: Excellent     RECOMMENDED REHABILITATION/EQUIPMENT: (at time of discharge pending progress): Continue Skilled Therapy. OCCUPATIONAL PROFILE AND HISTORY:   History of Present Injury/Illness (Reason for Referral): Pt presents this date s/p (Right) HALEIGH. Past Medical History/Comorbidities:   Ms. Troy De La Cruz  has a past medical history of Agatston coronary artery calcium score less than 100 (5/2014), Allergic rhinitis (1/22/2014), Arthritis, Colon polyps (1/2015), H/O cardiovascular stress test (5/2014), H/O mammogram (7/2013), Hematuria, HTN (hypertension) (1/22/2014), Hypercholesterolemia, Hypothyroidism (1/22/2014), Otitis externa, Otitis media, Pap smear for cervical cancer screening (7/2013), Thyroid disease, and URI (upper respiratory infection).  She also has no past medical history of Adverse effect of anesthesia, Aneurysm ([address] Arrhythmia, Asthma, Autoimmune disease ([address] CAD (coronary artery disease), Cancer ([address] Chronic kidney disease, Chronic obstructive pulmonary disease ([address] Chronic pain, Coagulation disorder ([address] Diabetes ([facility] Difficult intubation, Endocarditis, GERD (gastroesophageal reflux disease), Heart failure ([facility] Ill-defined condition, Liver disease, Malignant hyperthermia due to anesthesia, Morbid obesity ([facility] Nausea & vomiting, Nicotine vapor product user, Non-nicotine vapor product user, Pseudocholinesterase deficiency, Psychiatric disorder, PUD (peptic ulcer disease), Rheumatic fever, Seizures ([facility] [address] [facility] Sleep apnea, Stroke ([facility] [address] or Thromboembolus ([facility] [address] Ms. Abe James  has a past surgical history that includes hx colonoscopy (1/2015). Social History/Living Environment:   Home Environment: Private residence  # Steps to Enter: 3  Rails to Enter: No  One/Two Story Residence: One story  Living Alone: No  Support Systems: Spouse/Significant Other/Partner  Patient Expects to be Discharged to[de-identified] Private residence  Current DME Used/Available at Home: None  Tub or Shower Type: Shower  Prior Level of Function/Work/Activity:  Independent prior. Number of Personal Factors/Comorbidities that affect the Plan of Care: Brief history (0):  LOW COMPLEXITY   ASSESSMENT OF OCCUPATIONAL PERFORMANCE[de-identified]   Most Recent Physical Functioning:   Balance  Sitting: Intact  Standing: With support                    Coordination  Fine Motor Skills-Upper: Left Intact; Right Intact  Gross Motor Skills-Upper: Left Intact; Right Intact         Mental Status  Neurologic State: Alert  Orientation Level: Oriented X4  Cognition: Appropriate decision making  Perception: Appears intact                Basic ADLs (From Assessment) Complex ADLs (From Assessment)   Basic ADL  Feeding: Independent  Oral Facial Hygiene/Grooming: Setup  Bathing: Supervision  Type of Bath: Chlorhexidine (CHG), Full, Shower  Upper Body Dressing: Setup  Lower Body Dressing: Minimum assistance  Toileting: Stand by assistance     Grooming/Bathing/Dressing Activities of Daily Living   Grooming  Grooming Assistance: Supervision     Upper Body Bathing  Bathing Assistance: Supervision Lower Body Bathing  Bathing Assistance: Stand-by assistance     Upper Body Dressing Assistance  Dressing Assistance: Set-up Functional Transfers  Toilet Transfer : Stand-by assistance  Shower Transfer: Stand-by assistance   Lower Body Dressing Assistance  Dressing Assistance: Minimum assistance  Underpants: Stand-by assistance  Pants With Elastic Waist: Stand-by assistance  Socks: Minimum assistance  Shoes with Velcro: Set-up Bed/Mat Mobility  Supine to Sit: Stand-by assistance  Sit to Stand: Stand-by assistance  Stand to Sit: Setup  Bed to Chair: Stand-by assistance         Physical Skills Involved:  1. Range of Motion  2. Balance  3. Strength Cognitive Skills Affected (resulting in the inability to perform in a timely and safe manner):  1. [facility]  Psychosocial Skills Affected:  1. WFL    Number of elements that affect the Plan of Care: 1-3:  LOW COMPLEXITY   CLINICAL DECISION MAKIN[address] [place] [address] AM-PAC 6 Clicks   Daily Activity Inpatient Short Form  How much help from another person does the patient currently need. .. Total A Lot A Little None   1. Putting on and taking off regular lower body clothing? ? 1    2   x? 3   ? 4   2. Bathing (including washing, rinsing, drying)? ? 1    2   x? 3   ? 4   3. Toileting, which includes using toilet, bedpan or urinal?   ? 1    2   x? 3   ? 4   4. Putting on and taking off regular upper body clothing? ? 1   ? 2   ? 3   ? 4   5. Taking care of personal grooming such as brushing teeth? ? 1   ? 2   ? 3   ? 4   6. Eating meals? ? 1   ? 2   ? 3   ? 4   © , Trustees of [address] [place] [address], under license to "[employer] All rights reserved     Score:  Initial: 18 Most Recent: 21 (Date: 2019 )    Interpretation of Tool:  Represents activities that are increasingly more difficult (i.e. Bed mobility, Transfers, Gait). Medical Necessity:     · Skilled intervention continues to be required due to Deficits listed abvoe.   Reason for Services/Other Comments:  · Patient continues to require skilled intervention due to new HALEIGH   · . Use of outcome tool(s) and clinical judgement create a POC that gives a: MODERATE COMPLEXITY            TREATMENT:   (In addition to Assessment/Re-Assessment sessions the following treatments were rendered)     Pre-treatment Symptoms/Complaints:    Pain: Initial:   Pain Intensity 1: 4  Post Session:  3     Self Care: (40): Procedure(s) (per grid) utilized to improve and/or restore self-care/home management as related to dressing, bathing, toileting and grooming. Required minimal verbal and tactile cueing to facilitate activities of daily living skills. Treatment/Session Assessment:     Response to Treatment:  Good, sitting up in recliner. Education:  ? Home Exercises  ? Fall Precautions  ? Hip Precautions ? Going Home Video  ? Knee/Hip Prosthesis Review  ? Walker Management/Safety ? Adaptive Equipment as Needed       Interdisciplinary Collaboration:   o Physical Therapist  o Occupational Therapist  o Registered Nurse    After treatment position/precautions:   o Up in chair  o Bed/Chair-wheels locked  o Caregiver at bedside  o Call light within reach  o RN notified     Compliance with Program/Exercises: Compliant all of the time, Will assess as treatment progresses. Recommendations/Intent for next treatment session:  D/c OT for acute deficits.       Total Treatment Duration:  OT Patient Time In/Time Out  Time In: 0822  Time Out: [address], OT

## 2019-09-06 NOTE — DISCHARGE SUMMARY
[facility]  Total Joint Discharge Summary      Patient ID:  Moshe Muro  [id number]  4 y.o.  1945    Admit date: 9/5/2019  Discharge date and time: 9-6-19  Admitting Physician: Jo Chand MD  Surgeon: Same  Admission Diagnoses: Unilateral primary osteoarthritis, right hip [M16.11]  Arthritis of right hip [M16.11]  Discharge Diagnoses: Principal Problem:    H/O total hip arthroplasty, right (9/6/2019)    Active Problems:    Arthritis of right hip (9/5/2019)                                Perioperative Antibiotics: Ancef 1 to 2 mg was given depending on patient's weight. If allergic to Ancef or due to other indications, patient was given Vancomycin. Hospital Medications given:   [unfilled]  [unfilled]  [unfilled]    Discharge Medications given:  Current Discharge Medication List      START taking these medications    Details   promethazine (PHENERGAN) 25 mg tablet Take 1 Tab by mouth every six (6) hours as needed for Nausea. Qty: 40 Tab, Refills: 0      HYDROmorphone (DILAUDID) 2 mg tablet Take 1-2 Tabs by mouth every four (4) hours as needed for Pain for up to 7 days. Max Daily Amount: 24 mg. Qty: 60 Tab, Refills: 0    Associated Diagnoses: H/O total hip arthroplasty, right         CONTINUE these medications which have CHANGED    Details   aspirin delayed-release 81 mg tablet Take 1 Tab by mouth every twelve (12) hours every twelve (12) hours for 30 days. Qty: 60 Tab, Refills: 0         CONTINUE these medications which have NOT CHANGED    Details   benazepril (LOTENSIN) 20 mg tablet Take 1 Tab by mouth daily. Qty: 90 Tab, Refills: 3    Associated Diagnoses: Essential hypertension      amLODIPine (NORVASC) 5 mg tablet Take 1 Tab by mouth daily. Qty: 90 Tab, Refills: 3    Associated Diagnoses: Essential hypertension      levothyroxine (SYNTHROID) 25 mcg tablet Take 1 Tab by mouth Daily (before breakfast).   Qty: 90 Tab, Refills: 3    Associated Diagnoses: Congenital hypothyroidism without goiter      pravastatin (PRAVACHOL) 10 mg tablet Take 1 Tab by mouth nightly. Qty: 90 Tab, Refills: 3    Associated Diagnoses: Hypercholesterolemia         STOP taking these medications       multivitamin (ONE A DAY) tablet Comments:   Reason for Stopping:                Additional DVT Prophylaxis:  ANISH Hose,Plexi-Pulse    Postoperative transfusions:   none  Post Op complications: none    Hemoglobin at discharge:   Lab Results   Component Value Date/Time    HGB 12.9 09/05/2019 07:19 PM       Wound appears to be healing without any evidence of infection. Physical Therapy started on the day following surgery and progressed to independent ambulation with the aid of a walker. At the time of discharge, able to go up and down stairs and had understanding of precautions needed following surgery.       PT/OT:            Assistive Device: Walker (comment)                Discharged to: home    Discharge instructions:  -Rx pain medication given  - Anticoagulate with: Ecotrin 81 mg PO BID x 4 weeks  -Resume pre hospital diet             -Resume home medications per medical continuation form     -Ambulate with walker, appropriate total joint protocol  -Follow up in office as scheduled       Signed:  Karyle Chiquito, PA  9/6/2019  7:49 AM

## 2019-09-06 NOTE — DISCHARGE INSTRUCTIONS
Patient Education   [facility]   Patient Discharge Instructions    Cecilia Chaudhary / [id number] : 1945    Admitted 2019 Discharged: 2019     IF YOU HAVE ANY PROBLEMS ONCE YOU ARE AT HOME CALL THE FOLLOWING NUMBERS:   Main office number: [phone]    Take Home Medications         · It is important that you take the medication exactly as they are prescribed. · Keep your medication in the bottles provided by the pharmacist and keep a list of the medication names, dosages, and times to be taken in your wallet. · Do not take other medications without consulting your doctor. What to do at [address] your prehospital diet. If you have excessive nausea or vomitting call your doctor's office     Home Physical Therapy is arranged. Use rolling walker when walking. Patients who have had a joint replacement should not drive until you are seen for your follow up appointment by Dr. Shabana Christianson. When to Call    - Call if you have a temperature greater then 101  - Unable to keep food down  - Loose control of your bladder or bowel function  - Are unable to bear any weight   - Need a pain medication refill     Information obtained by :  I understand that if any problems occur once I am at home I am to contact my physician. I understand and acknowledge receipt of the instructions indicated above. Physician's or R.N.'s Signature                                                                  Date/Time                                                                                                                                              Patient or Representative Signature                                                          Date/Time           Hip Replacement Surgery (Posterior):  What to Expect at Home  Your Recovery  Hip replacement surgery replaces the worn parts of your hip joint. When you leave the hospital, you will probably be walking with crutches or a walker. You may be able to climb a few stairs and get in and out of bed and chairs. But you will need someone to help you at home for the next few weeks or until you have more energy and can move around better. If you need more extensive rehab, you may go to a specialized rehab center for more treatment. You will go home with a bandage and stitches, staples, tissue glue, or tape strips. You can remove the bandage when your doctor tells you to. If you have stitches or staples, your doctor will remove them 10 days to 3 weeks after your surgery. Glue or tape strips will fall off on their own over time. You may still have some mild pain, and the area may be swollen for 3 to 4 months after surgery. Your doctor will give you medicine for the pain. You will continue the rehabilitation program (rehab) you started in the hospital. The better you do with your rehab exercises, the sooner you will get your strength and movement back. Most people are able to return to work 4 weeks to 4 months after surgery. This care sheet gives you a general idea about how long it will take for you to recover. But each person recovers at a different pace. Follow the steps below to get better as quickly as possible. How can you care for yourself at home? Activity    · Your doctor may not want your affected leg to cross the center of your body toward the other leg. If so, your therapist may suggest these ideas:  ? Do not cross your legs. ? Be very careful as you get in or out of bed or a car, so your leg does not cross that imaginary line in the middle of your body.     · Rest when you feel tired. You may take a nap, but do not stay in bed all day.     · Work with your physical therapist to learn the best way to exercise.  You will probably have to use crutches or a walker for at least 4 to 6 weeks.     · Your doctor may advise you to stay away from activities that put stress on the joint. This includes sports such as tennis, football, and jogging.     · Try not to sit for too long at one time. You will feel less stiff if you take a short walk about every hour. When you sit, use chairs with arms, and do not sit in low chairs.     · Do not bend over more than 90 degrees (like the angle in a letter \"L\").     · Sleep on your back with your legs slightly apart or on your side with a pillow between your knees for about 6 weeks or as your doctor tells you. Do not sleep on your stomach or affected leg.     · Ask your doctor when you can drive again.     · Most people are able to return to work 4 weeks to 4 months after surgery.     · Ask your doctor when it is okay for you to have sex. Diet    · By the time you leave the hospital, you will probably be eating your normal diet. If your stomach is upset, try bland, low-fat foods like plain rice, broiled chicken, toast, and yogurt. Your doctor may recommend that you take iron and vitamin supplements.     · Drink plenty of fluids (unless your doctor tells you not to).   · Eat healthy foods, and watch your portion sizes. Try to stay at your ideal weight. Too much weight puts more stress on your new hip joint.     · You may notice that your bowel movements are not regular right after your surgery. This is common. Try to avoid constipation and straining with bowel movements. You may want to take a fiber supplement every day. If you have not had a bowel movement after a couple of days, ask your doctor about taking a mild laxative. Medicines    · Your doctor will tell you if and when you can restart your medicines. He or she will also give you instructions about taking any new medicines.     · If you take blood thinners, such as warfarin (Coumadin), clopidogrel (Plavix), or aspirin, be sure to talk to your doctor. He or she will tell you if and when to start taking those medicines again.  Make sure that you understand exactly what your doctor wants you to do.     · Your doctor may give you a blood-thinning medicine to prevent blood clots. If you take a blood thinner, be sure you get instructions about how to take your medicine safely. Blood thinners can cause serious bleeding problems. This medicine could be in pill form or as a shot (injection). If a shot is necessary, your doctor will tell you how to do this.     · Be safe with medicines. Take pain medicines exactly as directed. ? If the doctor gave you a prescription medicine for pain, take it as prescribed. ? If you are not taking a prescription pain medicine, ask your doctor if you can take an over-the-counter medicine.     · If you think your pain medicine is making you sick to your stomach:  ? Take your medicine after meals (unless your doctor has told you not to). ? Ask your doctor for a different pain medicine.     · If your doctor prescribed antibiotics, take them as directed. Do not stop taking them just because you feel better. You need to take the full course of antibiotics. Incision care    · If your doctor told you how to care for your cut (incision), follow your doctor's instructions. You will have a dressing over the cut. A dressing helps the incision heal and protects it. Your doctor will tell you how to take care of this.     · If you did not get instructions, follow this general advice:  ? If you have strips of tape on the cut the doctor made, leave the tape on for a week or until it falls off.  ? If you have stitches or staples, your doctor will tell you when to come back to have them removed. ? If you have skin adhesive on the cut, leave it on until it falls off. Skin adhesive is also called glue or liquid stitches. ? Change the bandage every day. ? Wash the area daily with warm water, and pat it dry. Don't use hydrogen peroxide or alcohol. They can slow healing.   ? You may cover the area with a gauze bandage if it oozes fluid or rubs against clothing. ? You may shower 24 to 48 hours after surgery. Pat the incision dry. Don't swim or take a bath for the first 2 weeks, or until your doctor tells you it is okay. Exercise    · Your rehab program will include a number of exercises to do. Always do them as your therapist tells you. Ice and elevation    · For pain, put ice or a cold pack on the area for 10 to 20 minutes at a time. Put a thin cloth between the ice and your skin.     · Your ankle may swell for about 3 months. Prop up your ankle when you ice it or anytime you sit or lie down. Try to keep it above the level of your heart. This will help reduce swelling. Other instructions   Continue to wear your support stockings as your doctor says. These help to prevent blood clots. The length of time that you will have to wear them depends on your activity level and the amount of swelling you have. Most people wear these stockings for 4 to 6 weeks after surgery.   Preventing falls is also very important. To prevent falls:    · Arrange furniture so that you will not trip on it.     · Get rid of throw rugs, and move electrical cords out of the way.     · Walk only in areas with plenty of light.     · Put grab bars in showers and bathtubs.     · Avoid icy or snowy sidewalks.     · Wear shoes with sturdy, flat soles. Follow-up care is a key part of your treatment and safety. Be sure to make and go to all appointments, and call your doctor if you are having problems. It's also a good idea to know your test results and keep a list of the medicines you take. When should you call for help? Call 911 anytime you think you may need emergency care.  For example, call if:    · You passed out (lost consciousness).     · You have severe trouble breathing.     · You have sudden chest pain and shortness of breath, or you cough up blood.    Call your doctor now or seek immediate medical care if:    · You have signs that your hip may be dislocated, including:  ? Severe pain and not being able to stand. ? A crooked leg that looks like your hip is out of position. ? Not being able to bend or straighten your leg.     · Your leg or foot is cool or pale or changes color.     · You cannot feel or move your leg.     · You have signs of a blood clot, such as:  ? Pain in your calf, back of the knee, thigh, or groin. ? Redness and swelling in your leg or groin.     · Your incision comes open and begins to bleed, or the bleeding increases.     · You feel like your heart is racing or beating irregularly.     · You have signs of infection, such as:  ? Increased pain, swelling, warmth, or redness. ? Red streaks leading from the incision. ? Pus draining from the incision. ? A fever.    Watch closely for changes in your health, and be sure to contact your doctor if:    · You do not have a bowel movement after taking a laxative.     · You do not get better as expected. Where can you learn more? Go to http://[place].info/. Enter H657 in the search box to learn more about \"Hip Replacement Surgery (Posterior): What to Expect at Home. \"  Current as of: September 20, 2018  Content Version: 12.1  © 0080-1184 [employer]. Care instructions adapted under license by [employer] (which disclaims liability or warranty for this information). If you have questions about a medical condition or this instruction, always ask your healthcare professional. [place] [address] any warranty or liability for your use of this information.

## 2019-09-07 ENCOUNTER — HOME CARE VISIT (OUTPATIENT)
Dept: SCHEDULING | Facility: HOME HEALTH | Age: 74
End: 2019-09-07
Payer: MEDICARE

## 2019-09-07 VITALS
RESPIRATION RATE: 16 BRPM | HEART RATE: 76 BPM | DIASTOLIC BLOOD PRESSURE: 78 MMHG | SYSTOLIC BLOOD PRESSURE: 110 MMHG | TEMPERATURE: 98.2 F

## 2019-09-07 PROCEDURE — 3331090002 HH PPS REVENUE DEBIT

## 2019-09-07 PROCEDURE — G0151 HHCP-SERV OF PT,EA 15 MIN: HCPCS

## 2019-09-07 PROCEDURE — 400013 HH SOC

## 2019-09-07 PROCEDURE — 3331090001 HH PPS REVENUE CREDIT

## 2019-09-08 PROCEDURE — 3331090002 HH PPS REVENUE DEBIT

## 2019-09-08 PROCEDURE — 3331090001 HH PPS REVENUE CREDIT

## 2019-09-09 ENCOUNTER — PATIENT OUTREACH (OUTPATIENT)
Dept: CASE MANAGEMENT | Age: 74
End: 2019-09-09

## 2019-09-09 ENCOUNTER — HOME CARE VISIT (OUTPATIENT)
Dept: SCHEDULING | Facility: HOME HEALTH | Age: 74
End: 2019-09-09
Payer: MEDICARE

## 2019-09-09 VITALS
OXYGEN SATURATION: 97 % | SYSTOLIC BLOOD PRESSURE: 119 MMHG | DIASTOLIC BLOOD PRESSURE: 64 MMHG | RESPIRATION RATE: 16 BRPM | HEART RATE: 68 BPM | TEMPERATURE: 99.1 F

## 2019-09-09 PROCEDURE — G0151 HHCP-SERV OF PT,EA 15 MIN: HCPCS

## 2019-09-09 PROCEDURE — 3331090002 HH PPS REVENUE DEBIT

## 2019-09-09 PROCEDURE — 3331090001 HH PPS REVENUE CREDIT

## 2019-09-09 NOTE — PROGRESS NOTES
This note will not be viewable in [address] [  Transition of Care Discharge Follow-up Questionnaire   What was patients diagnosis for hospital stay? Total Right hip replacement 9/9/19 1:40 PM   Does the patient understand his/her diagnosis and/or treatment and what happened during the hospitalization? Yes    Patient understands diagnosis and treatment    Did the patient receive discharge instructions? Yes     CM Assessed Risk for Readmission:     Patient stated Risk for Readmission:      Low risk for readmission. No stated risk of readmission at this time. Review any discharge instructions (see discharge instructions/AVS in [facility]). Ask patient if they understand these. Do they have any questions? Yes, reviewed discharge instructions. No questions at this time. Were home services ordered (nursing, PT, OT, ST, etc.)? Yes, both PT/OT   If so, has the first visit occurred? If not, why? (Assist with coordination of services if necessary.)   Yes Started, 9/5/19   Was any DME ordered? Yes     If so, has it been received? If not, why?  (Assist patient in obtaining DME orders &/or equipment if necessary.) Yes, [facility] and [facility]   Complete a review of all medications (new, continued and discontinued meds per the D/C instructions and medication tab in [facility] [employer]). Yes Started Phenergan and Hydromorphine  Stopped MV   Were all new prescriptions filled? If not, why?  (Assist patient in obtaining medications if necessary  escalate for CCM &/or SW if ongoing issues are verbalized by patient or anticipated)   Yes, per patient   Does the patient understand the purpose and dosing instructions for all medications? (If patient has questions, provide explanation and education.)   Yes    Does the patient have any problems in performing ADLs? (If patient is unable to perform ADLs  what is the limiting factor(s)?   Do they have a support system that can assist? If no support system is present, discuss possible assistance that they may be able to obtain. Escalate for CCM/SW if ongoing issues are verbalized by pt or anticipated)   No problems at this time per patient. She does have family support with ADLs. Does the patient have all follow-up appointments scheduled? 7 day f/up with PCP?   (f/up with PCP may be w/in 14 days if patient has a f/up with their specialist w/in 7 days)    7-14 day f/up with specialist?   (or per discharge instructions)    If f/up has not been made  what actions has the care coordinator made to accomplish this? Has transportation been arranged? Yes         Dr Lidia Law, 11/13/19 @ 8 AM    Dr Marcel Mcpherson Oct 2019          Yes, no concerns. Any other questions or concerns expressed by the patient? None at this time. Contact information for Care Coordinator was given, instructed to call with new questions or concerns. Schedule next appointment with NELA Snell or refer to RN Case Manager/ per the workflow guidelines. When is care coordinators next follow-up call scheduled? If referred for CCM  what RN care manager was the referral assigned? Care Coordinator will follow up per workflow guide lines.         7 to 14 days   JENI Call Completed By:   Julieta Negro LPN Care Coordinator

## 2019-09-10 PROCEDURE — 3331090001 HH PPS REVENUE CREDIT

## 2019-09-10 PROCEDURE — 3331090002 HH PPS REVENUE DEBIT

## 2019-09-11 ENCOUNTER — HOME CARE VISIT (OUTPATIENT)
Dept: SCHEDULING | Facility: HOME HEALTH | Age: 74
End: 2019-09-11
Payer: MEDICARE

## 2019-09-11 PROCEDURE — 3331090001 HH PPS REVENUE CREDIT

## 2019-09-11 PROCEDURE — G0157 HHC PT ASSISTANT EA 15: HCPCS

## 2019-09-11 PROCEDURE — 3331090002 HH PPS REVENUE DEBIT

## 2019-09-12 VITALS
DIASTOLIC BLOOD PRESSURE: 62 MMHG | HEART RATE: 80 BPM | TEMPERATURE: 98.1 F | SYSTOLIC BLOOD PRESSURE: 110 MMHG | RESPIRATION RATE: 18 BRPM

## 2019-09-12 PROCEDURE — 3331090002 HH PPS REVENUE DEBIT

## 2019-09-12 PROCEDURE — 3331090001 HH PPS REVENUE CREDIT

## 2019-09-13 ENCOUNTER — HOME CARE VISIT (OUTPATIENT)
Dept: SCHEDULING | Facility: HOME HEALTH | Age: 74
End: 2019-09-13
Payer: MEDICARE

## 2019-09-13 VITALS
TEMPERATURE: 98.8 F | RESPIRATION RATE: 16 BRPM | OXYGEN SATURATION: 97 % | DIASTOLIC BLOOD PRESSURE: 63 MMHG | HEART RATE: 64 BPM | SYSTOLIC BLOOD PRESSURE: 116 MMHG

## 2019-09-13 PROCEDURE — G0151 HHCP-SERV OF PT,EA 15 MIN: HCPCS

## 2019-09-13 PROCEDURE — 3331090001 HH PPS REVENUE CREDIT

## 2019-09-13 PROCEDURE — 3331090002 HH PPS REVENUE DEBIT

## 2019-09-14 PROCEDURE — 3331090002 HH PPS REVENUE DEBIT

## 2019-09-14 PROCEDURE — 3331090001 HH PPS REVENUE CREDIT

## 2019-09-15 PROCEDURE — 3331090002 HH PPS REVENUE DEBIT

## 2019-09-15 PROCEDURE — 3331090001 HH PPS REVENUE CREDIT

## 2019-09-16 PROCEDURE — 3331090002 HH PPS REVENUE DEBIT

## 2019-09-16 PROCEDURE — 3331090001 HH PPS REVENUE CREDIT

## 2019-09-17 ENCOUNTER — HOME CARE VISIT (OUTPATIENT)
Dept: SCHEDULING | Facility: HOME HEALTH | Age: 74
End: 2019-09-17
Payer: MEDICARE

## 2019-09-17 VITALS
TEMPERATURE: 98.4 F | HEART RATE: 68 BPM | RESPIRATION RATE: 16 BRPM | SYSTOLIC BLOOD PRESSURE: 125 MMHG | DIASTOLIC BLOOD PRESSURE: 64 MMHG | OXYGEN SATURATION: 97 %

## 2019-09-17 PROCEDURE — G0151 HHCP-SERV OF PT,EA 15 MIN: HCPCS

## 2019-09-17 PROCEDURE — 3331090002 HH PPS REVENUE DEBIT

## 2019-09-17 PROCEDURE — 3331090001 HH PPS REVENUE CREDIT

## 2019-09-18 PROCEDURE — 3331090001 HH PPS REVENUE CREDIT

## 2019-09-18 PROCEDURE — 3331090002 HH PPS REVENUE DEBIT

## 2019-09-19 ENCOUNTER — HOME CARE VISIT (OUTPATIENT)
Dept: SCHEDULING | Facility: HOME HEALTH | Age: 74
End: 2019-09-19
Payer: MEDICARE

## 2019-09-19 VITALS
HEART RATE: 78 BPM | DIASTOLIC BLOOD PRESSURE: 80 MMHG | SYSTOLIC BLOOD PRESSURE: 132 MMHG | RESPIRATION RATE: 17 BRPM | TEMPERATURE: 99.1 F

## 2019-09-19 PROCEDURE — 3331090002 HH PPS REVENUE DEBIT

## 2019-09-19 PROCEDURE — 3331090001 HH PPS REVENUE CREDIT

## 2019-09-19 PROCEDURE — G0157 HHC PT ASSISTANT EA 15: HCPCS

## 2019-09-20 PROCEDURE — A4649 SURGICAL SUPPLIES: HCPCS

## 2019-09-20 PROCEDURE — 3331090001 HH PPS REVENUE CREDIT

## 2019-09-20 PROCEDURE — 3331090002 HH PPS REVENUE DEBIT

## 2019-09-21 PROCEDURE — 3331090001 HH PPS REVENUE CREDIT

## 2019-09-21 PROCEDURE — 3331090002 HH PPS REVENUE DEBIT

## 2019-09-22 PROCEDURE — 3331090001 HH PPS REVENUE CREDIT

## 2019-09-22 PROCEDURE — 3331090002 HH PPS REVENUE DEBIT

## 2019-09-23 PROCEDURE — 3331090001 HH PPS REVENUE CREDIT

## 2019-09-23 PROCEDURE — 3331090002 HH PPS REVENUE DEBIT

## 2019-09-24 ENCOUNTER — HOME CARE VISIT (OUTPATIENT)
Dept: SCHEDULING | Facility: HOME HEALTH | Age: 74
End: 2019-09-24
Payer: MEDICARE

## 2019-09-24 VITALS
SYSTOLIC BLOOD PRESSURE: 118 MMHG | RESPIRATION RATE: 16 BRPM | DIASTOLIC BLOOD PRESSURE: 70 MMHG | TEMPERATURE: 99 F | HEART RATE: 64 BPM

## 2019-09-24 PROCEDURE — 3331090001 HH PPS REVENUE CREDIT

## 2019-09-24 PROCEDURE — G0151 HHCP-SERV OF PT,EA 15 MIN: HCPCS

## 2019-09-24 PROCEDURE — 3331090002 HH PPS REVENUE DEBIT

## 2019-09-25 PROCEDURE — 3331090001 HH PPS REVENUE CREDIT

## 2019-09-25 PROCEDURE — 3331090002 HH PPS REVENUE DEBIT

## 2019-09-26 ENCOUNTER — HOME CARE VISIT (OUTPATIENT)
Dept: SCHEDULING | Facility: HOME HEALTH | Age: 74
End: 2019-09-26
Payer: MEDICARE

## 2019-09-26 VITALS
TEMPERATURE: 99 F | RESPIRATION RATE: 16 BRPM | SYSTOLIC BLOOD PRESSURE: 122 MMHG | HEART RATE: 75 BPM | DIASTOLIC BLOOD PRESSURE: 76 MMHG

## 2019-09-26 PROCEDURE — 3331090002 HH PPS REVENUE DEBIT

## 2019-09-26 PROCEDURE — G0151 HHCP-SERV OF PT,EA 15 MIN: HCPCS

## 2019-09-26 PROCEDURE — 3331090001 HH PPS REVENUE CREDIT

## 2019-10-02 ENCOUNTER — PATIENT OUTREACH (OUTPATIENT)
Dept: CASE MANAGEMENT | Age: 74
End: 2019-10-02

## 2019-10-02 NOTE — PROGRESS NOTES
This note will not be viewable in [address] 2nd attempt to contact patient for [facility] call, no answer, left message 10/2/19. Will attempt 3rd call within 5 business days.

## 2019-10-03 ENCOUNTER — PATIENT OUTREACH (OUTPATIENT)
Dept: CASE MANAGEMENT | Age: 74
End: 2019-10-03

## 2019-10-03 NOTE — PROGRESS NOTES
This note will not be viewable in [address] Transitions of Care  Follow up Outreach Note   Outreach type  Phone call: Spoke with patient   Date/Time of Outreach:  10/3/19  2:45 PM     Has patient attended PCP or specialist follow-up appointments since last contact? What was outcome of appointment? When is next follow-up scheduled? Patient completed appointments with follow ups scheduled appropriately. Review medications. Any medication changes since last outreach? Does patient have any questions or issues related to their medications? Yes  Patient states there has been no changes in his medications since last JENI call. No questions from patient at time of phone call     Home health active? If yes  any issue? Progress? None     Referrals needed?  (CM, SW, HH, etc. )   None   Other issues/Miscellaneous? (Transportation, access to meals, ability to perform ADLs, adequate caregiver support, etc.)        No further questions or concerns at this time. Care Coordinator contact information provided to patient should a need arise. Next Outreach Scheduled?     Graduation from program?   15 - 30 days  No     Next Steps/Goals (if applicable):   NA     Outreach completed by:   Nicholas Murillo LPN  Care Coordinator

## 2019-11-12 NOTE — PROGRESS NOTES
This note will not be viewable in [address] Due to length of time since hospitalization and no needs identified on FU outreach no further [facility] outreach will be completed at this time. Episode closed.

## 2020-03-11 ENCOUNTER — HOSPITAL ENCOUNTER (OUTPATIENT)
Dept: PHYSICAL THERAPY | Age: 75
Discharge: HOME | End: 2020-03-11
Payer: MEDICARE

## 2020-03-11 PROCEDURE — 97161 PT EVAL LOW COMPLEX 20 MIN: CPT

## 2020-03-11 PROCEDURE — 97110 THERAPEUTIC EXERCISES: CPT

## 2020-03-11 NOTE — THERAPY EVALUATION
Ronaldo Christensen  : 1945    [place] / Product Type: Medicare /    Darl Greek at [address] [place] [address]. [address] [place] [address]  [phone]   [phone]        OUTPATIENT PHYSICAL THERAPY:Initial Assessment 3/11/2020    ICD-10: Treatment Diagnosis:   Pain in right hip (M25.551)  Stiffness of right hip, not elsewhere classified (M25.651)  Presence of right artificial hip joint (O01.544)          Precautions/Allergies:   Patient has no known allergies. Fall Risk Score: 1 (? 5 = High Risk)  MD Orders: Eval and Treat  MEDICAL/REFERRING DIAGNOSIS:  Presence of right artificial hip joint [Z96.641]   DATE OF ONSET: 2019  REFERRING PHYSICIAN: Adi Chris MD  RETURN PHYSICIAN APPOINTMENT: TBD by patient      INITIAL ASSESSMENT:  Ronaldo Christensen presents to physical therapy with decreased postural and hip/core strength, ROM, joint mobility, flexibility, functional mobility, and increased pain. No pelvic malalignment upon initial evaluation but decreased posture. These S/S are consistent with right hip pain. Ronaldo Christensen will benefit from skilled physical therapy (medically necessary) to address above deficits affecting participation in basic ADLs and functional mobility/tolerance. Patient will benefit from manual therapeutic techniques (stretching, joint mobilizations, soft tissue mobilization/myofascial release), therapeutic exercises and activities, postural strengthening/education, and comprehensive home exercises program to address current impairments and functional limitations.        PROBLEM LIST (Impacting functional limitations):  · Decreased Strength  · Decreased ADL/Functional Activities  · Decreased Transfer Abilities  · Decreased Ambulation Ability/Technique  · Decreased Balance  · Increased Pain  · Decreased Activity Tolerance  · Increased Fatigue  · Increased Shortness of Breath  · Decreased Flexibility/Joint Mobility  · Decreased Candler with Home Exercise Program INTERVENTIONS PLANNED:  1. Balance Exercise  2. Bed Mobility  3. Cold  4. Cryotherapy  5. Electrical Stimulation  6. Family Education  7. Gait Training  8. Heat  9. Home Exercise Program (HEP)  10. Manual Therapy  11. Neuromuscular Re-education/Strengthening  12. Range of Motion (ROM)  13. Therapeutic Activites  14. Therapeutic Exercise/Strengthening  15. Transfer Training  16. Ultrasound (US)   TREATMENT PLAN:  Effective Dates: 3/11/2020 TO 5/10/2020 (60 days). Frequency/Duration: 2 times a week for 60 Days  GOALS: (Goals have been discussed and agreed upon with patient.)   Short-Term Goals~4 weeks  Goal Met   1. Iker Cornejo will be independent with HEP for strength and ROM 1.  [] Date:   2. Iker Cornejo will participate in LE strengthening exercises for hip, knee, ankle with weight as appropriate for 3 sets of 10 2.  [] Date:   3. Iker Cornejo will report <=3/10 pain with stair climbing and demonstrate minimal to no difficulty 3. [] Date:   4. Iker Cornejo will demonstrate improvement of MMT from initial eval to 4+/5 B LE in order to return to participate in ADLs with minimal to no difficulty. 4.  [] Date:   5. Iker Cornejo will participate in static and dynamic balance activities for 5 minutes to help improve proprioception and decrease risk of falls. 5.  [] Date:   6. Iker Cornejo to increase lower extremity functional scale by 10 points to show improvement in areas of difficulty 6. [] Date:         Long Term Goals~8 weeks Goal Met   1. Iker Cornejo will be independent in HEP of stretching and strengthening 1. [] Date:   2. Iker Cornejo will be able to perform sit to stand transfers independently with decreased compensation  2. [] Date:   3. Iker Cornejo will ascend/descend 20 steps with reciprocal gait pattern and rail 3. [] Date:   4.  Iker Cornejo to increase lower extremity functional scale by 20 points to show improvement in areas of difficulty 4. [] Date:            CLINICAL DECISION MAKING:   Outcome Measure: Tool Used: Lower Extremity Functional Scale (LEFS)  Score:  Initial: 41/80 Most Recent: (/80 (Date: -- )   Interpretation of Score: 20 questions each scored on a 5 point scale with 0 representing \"extreme difficulty or unable to perform\" and 4 representing \"no difficulty\". The lower the score, the greater the functional disability. 80/80 represents no disability. Minimal detectable change is 9 points. Medical Necessity:   · Skilled intervention continues to be required due to deficits and impairments seen upon initial evaluation affecting patient's participation in ADLs and functional tasks. Reason for Services/Other Comments:  · Patient continues to require skilled intervention due to deficits and impairments seen upon initial evaluation affecting patient's participation in ADLs and functional tasks. Total Treatment Duration:  PT Patient Time In/Time Out  Time In: 0915  Time Out: 1005    Rehabilitation Potential For Stated Goals: excellent  Regarding Rosita Renteria's therapy, I certify that the treatment plan above will be carried out by a therapist or under their direction. Thank you for this referral,  Chadwick Fisher, PT     Referring Physician Signature: Vikash Olivares MD              Date                    HISTORY:   History of Present Injury/Illness (Reason for Referral):  Pt had a hip replacement last year and followed with Home Health PT after her surgery.      >Present Symptoms (on day of evaluation)  · Pain; Currently= 1/10  · Best= 0/10  · Worst= 1/10  ·   · Aggravating factors:  Walking and Stair climbing  · Relieving factors: Rest  · Irritability: Low (Onset of pain is is longer than the time it takes for Pain to go away)      Past Medical History/Comorbidities:   Ms. Reyes Flaming  has a past medical history of Agatston coronary artery calcium score less than 100 (5/2014), Allergic rhinitis (1/22/2014), Arthritis, Colon polyps (1/2015), H/O cardiovascular stress test (5/2014), H/O mammogram (7/2013), Hematuria, HTN (hypertension) (1/22/2014), Hypercholesterolemia, Hypothyroidism (1/22/2014), Otitis externa, Otitis media, Pap smear for cervical cancer screening (7/2013), Thyroid disease, and URI (upper respiratory infection). She also has no past medical history of Adverse effect of anesthesia, Aneurysm ([address] Arrhythmia, Asthma, Autoimmune disease ([address] CAD (coronary artery disease), Cancer ([address] Chronic kidney disease, Chronic obstructive pulmonary disease ([address] Chronic pain, Coagulation disorder ([address] Diabetes ([address] Difficult intubation, Endocarditis, GERD (gastroesophageal reflux disease), Heart failure ([address] Ill-defined condition, Liver disease, Malignant hyperthermia due to anesthesia, Morbid obesity ([address] Nausea & vomiting, Nicotine vapor product user, Non-nicotine vapor product user, Pseudocholinesterase deficiency, Psychiatric disorder, PUD (peptic ulcer disease), Rheumatic fever, Seizures ([address] Sleep apnea, Stroke ([address] or Thromboembolus ([address] Ms. Guillermina Rodriguez  has a past surgical history that includes hx colonoscopy (1/2015).     Active Ambulatory Problems     Diagnosis Date Noted    Allergic rhinitis 01/22/2014    HTN (hypertension) 01/22/2014    Hypothyroidism 01/22/2014    Hypercholesterolemia     Diverticulosis of large intestine without hemorrhage 01/15/2016    FH: colon cancer 05/11/2018    Tubular adenoma of colon 05/11/2018    Cystocele and rectocele with incomplete uterovaginal prolapse 05/14/2019    Primary osteoarthritis involving multiple joints 05/14/2019    Hematuria 05/14/2019    Arthritis of right hip 09/05/2019    H/O total hip arthroplasty, right 09/06/2019     Resolved Ambulatory Problems     Diagnosis Date Noted    Colon polyps 01/01/2015     Past Medical History:   Diagnosis Date    Agatston coronary artery calcium score less than 100 5/2014    Arthritis     H/O cardiovascular stress test 5/2014    H/O mammogram 7/2013    Otitis externa     Otitis media     Pap smear for cervical cancer screening 7/2013    Thyroid disease     URI (upper respiratory infection)      Social History/Living Environment:          Social History     Socioeconomic History    Marital status:      Spouse name: Not on file    Number of children: Not on file    Years of education: Not on file    Highest education level: Not on file   Occupational History    Not on file   Social Needs    Financial resource strain: Not on file    Food insecurity     Worry: Not on file     Inability: Not on file    Transportation needs     Medical: Not on file     Non-medical: Not on file   Tobacco Use    Smoking status: Never Smoker    Smokeless tobacco: Never Used   Substance and Sexual Activity    Alcohol use: No    Drug use: No    Sexual activity: Not on file   Lifestyle    Physical activity     Days per week: Not on file     Minutes per session: Not on file    Stress: Not on file   Relationships    Social connections     Talks on phone: Not on file     Gets together: Not on file     Attends Hoahaoism service: Not on file     Active member of club or organization: Not on file     Attends meetings of clubs or organizations: Not on file     Relationship status: Not on file    Intimate partner violence     Fear of current or ex partner: Not on file     Emotionally abused: Not on file     Physically abused: Not on file     Forced sexual activity: Not on file   Other Topics Concern    Not on file   Social History Narrative    Not on file     Prior Level of Function/Work/Activity:  Unrestricted  Dominant Side:  right  Previous Treatment Approach:  none  Other Clinical Tests:  X-RAY Negative for hip displacement    Current Medications:    Current Outpatient Medications:     benazepril (LOTENSIN) 20 mg tablet, Take 1 Tab by mouth daily.  Indications: high blood pressure, Disp: 90 Tab, Rfl: 3    amLODIPine (NORVASC) 5 mg tablet, Take 1 Tab by mouth daily. Indications: high blood pressure, Disp: 90 Tab, Rfl: 3    levothyroxine (SYNTHROID) 25 mcg tablet, Take 1 Tab by mouth Daily (before breakfast). Indications: a condition with low thyroid hormone levels, Disp: 90 Tab, Rfl: 3    pravastatin (PRAVACHOL) 10 mg tablet, Take 1 Tab by mouth nightly., Disp: 90 Tab, Rfl: 3    acetaminophen (TYLENOL) 500 mg tablet, Take 500 mg by mouth every six (6) hours as needed for Pain., Disp: , Rfl:     docusate sodium (COLACE) 100 mg capsule, Take 100 mg by mouth two (2) times daily as needed for Constipation. , Disp: , Rfl:     promethazine (PHENERGAN) 25 mg tablet, Take 1 Tab by mouth every six (6) hours as needed for Nausea., Disp: 40 Tab, Rfl: 0        Ambulatory/Rehab Services H2 Model Falls Risk Assessment    Risk Factors:       No Risk Factors Identified Ability to Rise from Chair:       (1)  Pushes up, successful in one attempt    Falls Prevention Plan:       No modifications necessary   Total: (5 or greater = High Risk): 1    ©2010 [facility] [employer]. All Rights Reserved. [facility] Patent #[id number]. Federal Law prohibits the replication, distribution or use without written permission from [facility] [employer]       Date Last Reviewed:  3/11/2020   Number of Personal Factors/Comorbidities that affect the Plan of Care: 0: LOW COMPLEXITY   EXAMINATION:   Observation/Orthostatic Postural Assessment:    · Gait= Moderate trendelenburg on left which indicates right glut med weakness. Left LE circumduction during swing phase.   Palpation:  Assessed @ Initial Visit: Tenderness to right glut med  ROM: Assessed @ Initial Visit:  AROM/PROM         Joint: Eval Date: 3/11/2020  Re-Assess Date:  Re-Assess Date:    Active ROM RIGHT LEFT RIGHT LEFT RIGHT LEFT   Knee Extension 0 0           Knee Flexion             Hip Flexion 95 116           Hip Abduction 40 41          Prone Hip Extension w/ Knee bent    Katy Sofia        Hip IR/ER   Katy Sofia          Strength:     Eval Date: 3/11/2020  Re-Assess Date:  Re-Assess Date:      RIGHT LEFT RIGHT LEFT RIGHT LEFT   Knee Flexion 4+/5 4+/5           Knee Extension 4+/5 5/5           Hip Flexion 4/5 4+/5           Hip Abduction 3-/5 4+/5           Hip Extension 4-/5 4+/5                            Special Tests: Assessed @ Initial Visit   · none indicated     Neurological Screen: Patient demonstrates/reports no loss in sensation  Functional Mobility:  Affecting participation in ambulation and standing   · Squat:  · Steps:  Balance:    Single Leg Stance: R= <6 seconds/ L= <15 seconds  TUG 8.72, 6.66  Sit to stand: 14   Body Structures Involved:  1. Nerves  2. Bones  3. Joints  4. Muscles  5. Ligaments Body Functions Affected:  1. Sensory/Pain  2. Reproductive  3. Neuromusculoskeletal  4. Movement Related Activities and Participation Affected:  1. Mobility  2. Self Care   Number of elements that affect the Plan of Care: 3: MODERATE COMPLEXITY   CLINICAL PRESENTATION:   Presentation: Evolving clinical presentation with changing clinical characteristics: MODERATE COMPLEXITY         Use of outcome tool(s) and clinical judgement create a POC that gives a: Questionable prediction of patient's progress: MODERATE COMPLEXITY   See treatment note for associated treatment provided today.     Future Appointments   Date Time Provider Yoon Suarez   3/13/2020  8:15 AM Seamus Ramírez, PT [facility]   3/17/2020  9:45 AM [facility]   3/25/2020  8:45 AM Seamus Darrell, PT [facility]   3/27/2020  7:30 AM Seamus Fridax, PT [facility]   3/31/2020  9:45 AM Seamus Fridax, PT [facility]   4/3/2020  9:45 AM Seamus Darrell, PT [facility]   4/8/2020  9:30 AM [facility]   4/10/2020  9:30 AM Seamus Darrell, PT [facility]   4/14/2020  9:45 AM Nahum Britt, PT [facility]   4/17/2020  9:00 AM N[facility]um Britt PT [facility]   5/20/2020 10:15 AM Kathrin Lynch MD [facility]         Maida Tenorio, PT

## 2020-03-11 NOTE — PROGRESS NOTES
Claudia Moreland  : 1945  [place] / Product Type: Medicare /  [id number] at [address] [place] [address]. [facility], [address] [place] [address]  [phone]   [phone]                                                          Rohan García MD      OUTPATIENT PHYSICAL THERAPY: Daily Treatment Note 3/11/2020 Visit Count:  1    ICD 10:  Pain in right hip (M25.551)  Stiffness of right hip, not elsewhere classified (M25.651)  Presence of right artificial hip joint (H36.518)        Pre-treatment Symptoms/Complaints:  See Initial Eval Dated 3/11/2020 for more details. Pain: Initial:0/10 Post Session: 0/10   Medications Last Reviewed:  3/11/2020     Updated Objective Findings: See Initial Eval for more details. TREATMENT:   THERAPEUTIC EXERCISE: (25 minutes):  Exercises per grid below to improve mobility, strength and balance. Required minimal visual, verbal and manual cues to promote proper body alignment and promote proper body posture. Progressed resistance and complexity of movement as indicated. Date:  3/11/2020 Date:   Date:     Activity/Exercise Parameters Parameters Parameters   Education HEP, POC, PT goals, anatomy/pathology, PT rationale, education, rehab potential and prognosis     Hip Abduction 30xs     Hip extension 30xs     bridges      Hip hikes                        THERAPEUTIC ACTIVITY: ( 0 minutes): Activities per gid below to improve functional movement related mobility, strength and balance to improve neuro-muscular carryover to daily functional activities for improving patient's quality of life. Required visual, verbal and manual cues to promote proper body alignment and promote proper body posture/mechanics. Progressed resistance and complexity of movement as indicated.      Date:  3/11/2020 Date:   Date:     Activity/Exercise Parameters Parameters Parameters                           [place]                                                 MANUAL THERAPY: (0 minutes): Joint mobilization, Soft tissue mobilization was utilized and necessary because of the patient's restricted joint motion and restricted motion of soft tissue mobility. Date  3/11/2020    Technique Used Grade Level # Time(s) Effect while being performed                                                                 HEP Log Date 1.   2 way hip Abd/ext 3/11/2020   2. Hip hikes 3/11/2020   3.bridges 3/11/2020   4.    5.           MedBridge Portal  Treatment/Session Summary:    Response to Treatment: Pt demonstrated understanding of POC and initial HEP. No increase in pain or adverse reactions. Communication/Consultation:  POC, HEP, PT goals, Faxed initial evaluation to MD.   Equipment provided today: HEP Handout     Recommendations/Intent for next treatment session:   Next visit will focus on Manual Therapy Core Stability Quad strengthening Hip strengthening soft tissue mobilization. Treatment Plan of Care Effective Dates: 3/11/2020 TO 5/10/2020 (60 days).   Frequency/Duration: 2 times a week for 60 Days             Total Treatment Billable Duration:   25  Rx plus Eval   PT Patient Time In/Time Out  Time In: 0915  Time Out: [facility], PT    Future Appointments   Date Time Provider Yoon Bañuelosi   3/13/2020  8:15 AM Haydee Rashad, PT [facility]   3/17/2020  9:45 AM Haydee Rashad, PT [facility]   3/25/2020  8:45 AM Haydee Rashad, PT [facility]   3/27/2020  7:30 AM Haydee Rashad, PT [facility]   3/31/2020  9:45 AM Haydee Rashad, PT [facility]   4/3/2020  9:45 AM Haydee Rashad, PT [facility]   4/8/2020  9:30 AM Haydee Rashad, PT [facility]   4/10/2020  9:30 AM Haydee Rashad, PT [facility]   4/14/2020  9:45 AM Haydee Rashad, PT [facility]   4/17/2020  9:00 AM Haydee Rashad, PT [facility] 5/20/2020 10:15 AM Azeb Lynch MD [facility]

## 2020-03-13 ENCOUNTER — HOSPITAL ENCOUNTER (OUTPATIENT)
Dept: PHYSICAL THERAPY | Age: 75
Discharge: HOME | End: 2020-03-13
Payer: MEDICARE

## 2020-03-13 PROCEDURE — 97530 THERAPEUTIC ACTIVITIES: CPT

## 2020-03-13 PROCEDURE — 97110 THERAPEUTIC EXERCISES: CPT

## 2020-03-13 NOTE — PROGRESS NOTES
Sherita Fail  : 1945  [place] / Product Type: Medicare /  [id number] Located within [address] [facility] [address] at [address]. [facility], [address] [place] [address]  [phone]   [phone]                                                          Adeola Baker MD      OUTPATIENT PHYSICAL THERAPY: Daily Treatment Note 3/13/2020 Visit Count:  2    ICD 10:  Pain in right hip (M25.551)  Stiffness of right hip, not elsewhere classified (M25.651)  Presence of right artificial hip joint (Z96.641)        Pre-treatment Symptoms/Complaints:  Pt reports that her hip is doing well and she has been compliant with her HEP. Pain: Initial:0/10 Post Session: 0/10   Medications Last Reviewed:  3/13/2020     Updated Objective Findings: + Trendelenburg on right        TREATMENT:   THERAPEUTIC EXERCISE: (23 minutes):  Exercises per grid below to improve mobility, strength and balance. Required minimal visual, verbal and manual cues to promote proper body alignment and promote proper body posture. Progressed resistance and complexity of movement as indicated. Date:  3/11/2020 Date:  3/13/2020 Date:     Activity/Exercise Parameters Parameters Parameters   Education HEP, POC, PT goals, anatomy/pathology, PT rationale, education, rehab potential and prognosis Educated on the importance or     Hip Abduction 30xs 30xs    Hip extension 30xs 30xs    bridges      Hip hikes  30xs jason    Clams  30xs green    Glut min clams  30xs          THERAPEUTIC ACTIVITY: ( 15 minutes): Activities per gid below to improve functional movement related mobility, strength and balance to improve neuro-muscular carryover to daily functional activities for improving patient's quality of life. Required visual, verbal and manual cues to promote proper body alignment and promote proper body posture/mechanics. Progressed resistance and complexity of movement as indicated.      Date:  3/11/2020 Date:  3/13/2020 Date:     Activity/Exercise Parameters Parameters Parameters   Lateral walking   3xs green band     Monster walks   3xs green band     Shuttle   50lb 3x10  Sl 25 2x10                                           MANUAL THERAPY: (0 minutes): Joint mobilization, Soft tissue mobilization was utilized and necessary because of the patient's restricted joint motion and restricted motion of soft tissue mobility. Date  3/13/2020    Technique Used Grade Level # Time(s) Effect while being performed                                                                 HEP Log Date 1.   2 way hip Abd/ext 3/11/2020   2. Hip hikes 3/11/2020   3.bridges 3/11/2020   4. clams    5. MedBridge Portal  Treatment/Session Summary:    Response to Treatment: Pt continued with strengthening to improve hip strengthening and glut med activation. No pian iwht exercises but patient demonstrates weakness in her right leg. Communication/Consultation:  none   Equipment provided today: HEP Handout     Recommendations/Intent for next treatment session:   Next visit will focus on Manual Therapy Core Stability Quad strengthening Hip strengthening soft tissue mobilization. Treatment Plan of Care Effective Dates: 3/11/2020 TO 5/10/2020 (60 days).   Frequency/Duration: 2 times a week for 60 Days             Total Treatment Billable Duration:   40  Rx   PT Patient Time In/Time Out  Time In: 0816  Time Out: 0900  Ltitle Rubin PT    Future Appointments   Date Time Provider Yoon Suarez   3/17/2020  9:45 AM Arminda Villegas, PT [facility]   3/25/2020  8:45 AM Arminda Villegas, PT [facility]   3/27/2020  7:30 AM Arminda Villegas, PT [facility]   3/31/2020  9:45 AM Arminda Villegas, PT [facility]   4/3/2020  9:45 AM Arminda Villegas, PT [facility]   4/8/2020  9:30 AM Arminda Villegas, PT [facility]   4/10/2020  9:30 AM Arminda Villegas, PT [facility]   4/14/2020  9:45 AM [facility]   4/17/2020  9:00 AM [facility]   5/20/2020 10:15 AM Jhoan Lynch MD [facility] HT[facility]

## 2020-03-16 ENCOUNTER — HOSPITAL ENCOUNTER (OUTPATIENT)
Dept: PHYSICAL THERAPY | Age: 75
Discharge: HOME | End: 2020-03-16
Payer: MEDICARE

## 2020-03-16 PROCEDURE — 97110 THERAPEUTIC EXERCISES: CPT

## 2020-03-16 PROCEDURE — 97530 THERAPEUTIC ACTIVITIES: CPT

## 2020-03-17 ENCOUNTER — HOSPITAL ENCOUNTER (OUTPATIENT)
Dept: PHYSICAL THERAPY | Age: 75
Discharge: HOME | End: 2020-03-17
Payer: MEDICARE

## 2020-03-17 PROCEDURE — 97116 GAIT TRAINING THERAPY: CPT

## 2020-03-17 PROCEDURE — 97110 THERAPEUTIC EXERCISES: CPT

## 2020-03-17 NOTE — PROGRESS NOTES
Nubia Dunn  : 1945  [place] / Product Type: Medicare /  [address] [facility] at [address] [place] [address]. [facility], [address] [place] [address]  [phone]   [phone]                                                          Damir Stevens MD      OUTPATIENT PHYSICAL THERAPY: Daily Treatment Note 3/17/2020 Visit Count:  4    ICD 10:  Pain in right hip (M25.551)  Stiffness of right hip, not elsewhere classified (M25.651)  Presence of right artificial hip joint (Z96.641)        Pre-treatment Symptoms/Complaints:  No difference in walking and patient has been compliant with HEP. Pain: Initial:0/10 Post Session: 0/10   Medications Last Reviewed:  3/17/2020     Updated Objective Findings: + Trendelenburg on right        TREATMENT:   THERAPEUTIC EXERCISE: (30 minutes):  Exercises per grid below to improve mobility, strength and balance. Required minimal visual, verbal and manual cues to promote proper body alignment and promote proper body posture. Progressed resistance and complexity of movement as indicated. Date:  3/11/2020 Date:  3/13/2020 Date:  3/16/2020 Date  3/17/2020   Activity/Exercise Parameters Parameters Parameters    Education HEP, POC, PT goals, anatomy/pathology, PT rationale, education, rehab potential and prognosis Educated on the importance or      Hip Abduction 30xs 30xs 30xs S/L 30xs   Hip extension 30xs 30xs 30xs 30xs   bridges    30xs   Hip hikes  30xs jason 30xs     Clams  30xs green 40xs green    Glut min clams  30xs 30xs    Single stance   3 min 45 secons 3xs   calf stretch    3 min   Hamstring stretch    3 min   G;uteal stretch    2 min                THERAPEUTIC ACTIVITY: ( 0 minutes): Activities per gid below to improve functional movement related mobility, strength and balance to improve neuro-muscular carryover to daily functional activities for improving patient's quality of life.  Required visual, verbal and manual cues to promote proper body alignment and promote proper body posture/mechanics. Progressed resistance and complexity of movement as indicated. Date:  3/11/2020 Date:  3/13/2020 Date:  3/16/2020 Date     3/17/2020   Activity/Exercise Parameters Parameters Parameters    Lateral walking   3xs green band 3xs green    Monster walks   3xs green band 3xs green    Shuttle   50lb 3x10  Sl 25 2x10 50lb 3x10  Sl 25 2x10  S/L  2x10 12.5 lbs    CCTKE    30xs purple                              Gait Training (  15 min):  Gait training to improve and/or restore physical functioning as related to dynamic movement of hip - right to improve functional walking. Ambulated   with independence and minimal visual and verbal cues related to their stance phaseto promote proper body posture. Instruction in performance of hip drop to correct stance phase and stride length. MANUAL THERAPY: (0 minutes): Joint mobilization, Soft tissue mobilization was utilized and necessary because of the patient's restricted joint motion and restricted motion of soft tissue mobility. Date  3/17/2020    Technique Used Grade Level # Time(s) Effect while being performed                                                                 HEP Log Date 1.   2 way hip Abd/ext 3/11/2020   2. Hip hikes 3/11/2020   3.bridges 3/11/2020   4. clams    5. [employer] Portal  Treatment/Session Summary:    Response to Treatment: Pt continued with mobility and strengthening to improve walking and decreasing the amount of hip drop during stance phase. Communication/Consultation:  none   Equipment provided today: HEP Handout     Recommendations/Intent for next treatment session:   Next visit will focus on Manual Therapy Core Stability Quad strengthening Hip strengthening soft tissue mobilization. Treatment Plan of Care Effective Dates: 3/11/2020 TO 5/10/2020 (60 days).   Frequency/Duration: 2 times a week for 60 Days Total Treatment Billable Duration:   46  Rx   PT Patient Time In/Time Out  Time In: 9322  Time Out: [address] Nahun Goodwin, PT    Future Appointments   Date Time Provider Yoon Suarez   3/25/2020  8:45 AM Margrette April, PT [facility] AND [facility]   3/27/2020  7:30 AM Margrette April, PT [facility]   3/31/2020  9:45 AM Margrette April, PT [facility]   4/3/2020  9:45 AM Margrette April, PT [facility]   4/8/2020  9:30 AM Margrette April, PT [facility]   4/10/2020  9:30 AM Margrette April, PT [facility]   4/14/2020  9:45 AM Margrette April, PT [facility]   4/17/2020  9:00 AM Margrette April, PT [facility]   5/20/2020 10:15 AM Judith Lynch MD [facility]

## 2020-05-11 ENCOUNTER — HOSPITAL ENCOUNTER (OUTPATIENT)
Dept: PHYSICAL THERAPY | Age: 75
Discharge: HOME | End: 2020-05-11
Payer: MEDICARE

## 2020-05-11 PROCEDURE — 97164 PT RE-EVAL EST PLAN CARE: CPT

## 2020-05-11 PROCEDURE — 97110 THERAPEUTIC EXERCISES: CPT

## 2020-05-11 PROCEDURE — 97530 THERAPEUTIC ACTIVITIES: CPT

## 2020-05-11 NOTE — THERAPY EVALUATION
Delroy Fernandez  : 1945    [place] / Product Type: Medicare /    Sophia Pimentel at [address] [place] [address]. [facility], [address], [facility], [address] [place] [address]  [phone]   [phone]        OUTPATIENT PHYSICAL THERAPY: Re-certification Assessment 2020    ICD-10: Treatment Diagnosis:   Pain in right hip (M25.551)  Stiffness of right hip, not elsewhere classified (M25.651)  Presence of right artificial hip joint (S99.094)          Precautions/Allergies:   Patient has no known allergies. Fall Risk Score: 1 (? 5 = High Risk)  MD Orders: Eval and Treat  MEDICAL/REFERRING DIAGNOSIS:  Presence of right artificial hip joint [Z96.641]   DATE OF ONSET: 2019  REFERRING PHYSICIAN: Manuel Smith MD  RETURN PHYSICIAN APPOINTMENT: TBD by patient      INITIAL ASSESSMENT:  Delroy Fernandez presents to physical therapy with decreased postural and hip/core strength, ROM, joint mobility, flexibility, functional mobility, and increased pain. No pelvic malalignment upon initial evaluation but decreased posture. These S/S are consistent with right hip pain. Delroy Fernandez will benefit from skilled physical therapy (medically necessary) to address above deficits affecting participation in basic ADLs and functional mobility/tolerance. Patient will benefit from manual therapeutic techniques (stretching, joint mobilizations, soft tissue mobilization/myofascial release), therapeutic exercises and activities, postural strengthening/education, and comprehensive home exercises program to address current impairments and functional limitations.        PROBLEM LIST (Impacting functional limitations):  · Decreased Strength  · Decreased ADL/Functional Activities  · Decreased Transfer Abilities  · Decreased Ambulation Ability/Technique  · Decreased Balance  · Increased Pain  · Decreased Activity Tolerance  · Increased Fatigue  · Increased Shortness of Breath  · Decreased Flexibility/Joint Mobility  · Decreased Will with Home Exercise Program INTERVENTIONS PLANNED:  1. Balance Exercise  2. Bed Mobility  3. Cold  4. Cryotherapy  5. Electrical Stimulation  6. Family Education  7. Gait Training  8. Heat  9. Home Exercise Program (HEP)  10. Manual Therapy  11. Neuromuscular Re-education/Strengthening  12. Range of Motion (ROM)  13. Therapeutic Activites  14. Therapeutic Exercise/Strengthening  15. Transfer Training  16. Ultrasound (US)   TREATMENT PLAN:  Effective Dates: 5/11/2020 TO 7/10/2020(60 days). Frequency/Duration: 2 times a week for 60 Days  GOALS: (Goals have been discussed and agreed upon with patient.)   Short-Term Goals~4 weeks  Goal Met   1. Delroy Fernandez will be independent with HEP for strength and ROM 1. [x] Date:5/11   2. Delroy Fernandez will participate in LE strengthening exercises for hip, knee, ankle with weight as appropriate for 3 sets of 10 2. [x] Date:5/11   3. Delroy Fernandez will report <=3/10 pain with stair climbing and demonstrate minimal to no difficulty 3. [x] Date:5/11   4. Delroy Fernandez will demonstrate improvement of MMT from initial eval to 4+/5 B LE in order to return to participate in ADLs with minimal to no difficulty. 4.  [] Date:   5. Delroy Fernandez will participate in static and dynamic balance activities for 5 minutes to help improve proprioception and decrease risk of falls. 5.  [] Date:   6. Delroy Fernandez to increase lower extremity functional scale by 10 points to show improvement in areas of difficulty 6. [x] Date:5/11         Long Term Goals~8 weeks Goal Met   1. Delroy Fernandez will be independent in HEP of stretching and strengthening 1. [] Date:   2. Delroy Fernandez will be able to perform sit to stand transfers independently with decreased compensation  2. [] Date:   3. Delroy Fernandez will ascend/descend 20 steps with reciprocal gait pattern and rail 3. [] Date:   4.  Derloy Fernandez to increase lower extremity functional scale by 20 points to show improvement in areas of difficulty 4. [] Date:            CLINICAL DECISION MAKING:   Outcome Measure: Tool Used: Lower Extremity Functional Scale (LEFS)  Score:  Initial: 41/80 Most Recent: (51/80) (Date:5/11/2020 )   Interpretation of Score: 20 questions each scored on a 5 point scale with 0 representing \"extreme difficulty or unable to perform\" and 4 representing \"no difficulty\". The lower the score, the greater the functional disability. 80/80 represents no disability. Minimal detectable change is 9 points. Medical Necessity:   · Skilled intervention continues to be required due to deficits and impairments seen upon initial evaluation affecting patient's participation in ADLs and functional tasks. Reason for Services/Other Comments:  · Patient continues to require skilled intervention due to deficits and impairments seen upon initial evaluation affecting patient's participation in ADLs and functional tasks. Total Treatment Duration:  PT Patient Time In/Time Out  Time In: 0619  Time Out: 9196    Rehabilitation Potential For Stated Goals: excellent  Regarding Vanda Renteria's therapy, I certify that the treatment plan above will be carried out by a therapist or under their direction. Thank you for this referral,  Kerwin Coronado PT     Referring Physician Signature: Sarah Dudley MD              Date                    HISTORY:   History of Present Injury/Illness (Reason for Referral):  Pt had a hip replacement last year and followed with Home Health PT after her surgery.      >Present Symptoms (on day of evaluation)  · Pain; Currently= 0/10  · Best= 0/10  · Worst= 1/10  ·   · Aggravating factors:  Walking, Stair climbing and cleaning the tub  · Relieving factors: Rest  · Irritability: Low (Onset of pain is is longer than the time it takes for Pain to go away)      Past Medical History/Comorbidities:   Ms. Jennifer Rosen  has a past medical history of [facility] coronary artery calcium score less than 100 (5/2014), Allergic rhinitis (1/22/2014), Arthritis, Colon polyps (1/2015), H/O cardiovascular stress test (5/2014), H/O mammogram (7/2013), Hematuria, HTN (hypertension) (1/22/2014), Hypercholesterolemia, Hypothyroidism (1/22/2014), Otitis externa, Otitis media, Pap smear for cervical cancer screening (7/2013), Thyroid disease, and URI (upper respiratory infection). She also has no past medical history of Adverse effect of anesthesia, Aneurysm ([address] Arrhythmia, Asthma, Autoimmune disease ([address] CAD (coronary artery disease), Cancer ([address] Chronic kidney disease, Chronic obstructive pulmonary disease ([address] Chronic pain, Coagulation disorder ([address] Diabetes ([address] Difficult intubation, Endocarditis, GERD (gastroesophageal reflux disease), Heart failure ([address] Ill-defined condition, Liver disease, Malignant hyperthermia due to anesthesia, Morbid obesity ([address] Nausea & vomiting, Nicotine vapor product user, Non-nicotine vapor product user, Pseudocholinesterase deficiency, Psychiatric disorder, PUD (peptic ulcer disease), Rheumatic fever, Seizures ([address] Sleep apnea, Stroke ([address] or Thromboembolus ([address] Ms. Huber He  has a past surgical history that includes hx colonoscopy (1/2015).     Active Ambulatory Problems     Diagnosis Date Noted    Allergic rhinitis 01/22/2014    HTN (hypertension) 01/22/2014    Hypothyroidism 01/22/2014    Hypercholesterolemia     Diverticulosis of large intestine without hemorrhage 01/15/2016    FH: colon cancer 05/11/2018    Tubular adenoma of colon 05/11/2018    Cystocele and rectocele with incomplete uterovaginal prolapse 05/14/2019    Primary osteoarthritis involving multiple joints 05/14/2019    Hematuria 05/14/2019    Arthritis of right hip 09/05/2019    H/O total hip arthroplasty, right 09/06/2019     Resolved Ambulatory Problems     Diagnosis Date Noted    Colon polyps 01/01/2015     Past Medical History:   Diagnosis Date    [facility] less than 100 [facility]/2014    Arthritis     H/O cardiovascular stress test 5/2014    H/O mammogram 7/2013    Otitis externa     Otitis media     Pap smear for cervical cancer screening 7/2013    Thyroid disease     URI (upper respiratory infection)      Social History/Living Environment:          Social History     Socioeconomic History    Marital status:      Spouse name: Not on file    Number of children: Not on file    Years of education: Not on file    Highest education level: Not on file   Occupational History    Not on file   Social Needs    Financial resource strain: Not on file    Food insecurity     Worry: Not on file     Inability: Not on file    Transportation needs     Medical: Not on file     Non-medical: Not on file   Tobacco Use    Smoking status: Never Smoker    Smokeless tobacco: Never Used   Substance and Sexual Activity    Alcohol use: No    Drug use: No    Sexual activity: Not on file   Lifestyle    Physical activity     Days per week: Not on file     Minutes per session: Not on file    Stress: Not on file   Relationships    Social connections     Talks on phone: Not on file     Gets together: Not on file     Attends [employer] witness service: Not on file     Active member of club or organization: Not on file     Attends meetings of clubs or organizations: Not on file     Relationship status: Not on file    Intimate partner violence     Fear of current or ex partner: Not on file     Emotionally abused: Not on file     Physically abused: Not on file     Forced sexual activity: Not on file   Other Topics Concern    Not on file   Social History Narrative    Not on file     Prior Level of Function/Work/Activity:  Unrestricted  Dominant Side:  right  Previous Treatment Approach:  none  Other Clinical Tests:  X-RAY Negative for hip displacement    Current Medications:    Current Outpatient Medications:     benazepril (LOTENSIN) 20 mg tablet, Take 1 Tab by mouth daily. Indications: high blood pressure, Disp: 90 Tab, Rfl: 3    amLODIPine (NORVASC) 5 mg tablet, Take 1 Tab by mouth daily. Indications: high blood pressure, Disp: 90 Tab, Rfl: 3    levothyroxine (SYNTHROID) 25 mcg tablet, Take 1 Tab by mouth Daily (before breakfast). Indications: a condition with low thyroid hormone levels, Disp: 90 Tab, Rfl: 3    pravastatin (PRAVACHOL) 10 mg tablet, Take 1 Tab by mouth nightly., Disp: 90 Tab, Rfl: 3    acetaminophen (TYLENOL) 500 mg tablet, Take 500 mg by mouth every six (6) hours as needed for Pain., Disp: , Rfl:     docusate sodium (COLACE) 100 mg capsule, Take 100 mg by mouth two (2) times daily as needed for Constipation. , Disp: , Rfl:     promethazine (PHENERGAN) 25 mg tablet, Take 1 Tab by mouth every six (6) hours as needed for Nausea., Disp: 40 Tab, Rfl: 0        Ambulatory/Rehab Services H2 Model Falls Risk Assessment    Risk Factors:       No Risk Factors Identified Ability to Rise from Chair:       (0)  Ability to rise in a single movement    Falls Prevention Plan:       No modifications necessary   Total: (5 or greater = High Risk): 0    ©2010 [facility] [place]. All Rights Reserved. [facility] Patent #[id number]. Federal Law prohibits the replication, distribution or use without written permission from [facility] [employer]       Date Last Reviewed:  5/11/2020   Number of Personal Factors/Comorbidities that affect the Plan of Care: 0: LOW COMPLEXITY   EXAMINATION:   Observation/Orthostatic Postural Assessment:    · Gait= Moderate trendelenburg on left which indicates right glut med weakness. Left LE circumduction during swing phase.   Palpation:  Assessed @ Initial Visit: Tenderness to right glut med  ROM: Assessed @ Initial Visit:  AROM/PROM         Joint: Eval Date: 3/11/2020  Re-Assess Date: 5/11/2020  Re-Assess Date:    Active ROM RIGHT LEFT RIGHT LEFT RIGHT LEFT   Knee Extension 0 0 0 0       Knee Flexion           Hip Flexion 95 116 112 118       Hip Abduction 40 41 42 45       Prone Hip Extension w/ Knee bent            Hip IR/ER             Strength:     Eval Date: 3/11/2020  Re-Assess Date:  Re-Assess Date:      RIGHT LEFT RIGHT LEFT RIGHT LEFT   Knee Flexion 4+/5 4+/5 5/5 5/5       Knee Extension 4+/5 5/5 5/5 5/5       Hip Flexion 4/5 4+/5 4+/5 4+/5       Hip Abduction 3-/5 4+/5 4/5 4/5       Hip Extension 4-/5 4+/5 4/5                       Special Tests: Assessed @ Initial Visit   · none indicated     Neurological Screen: Patient demonstrates/reports no loss in sensation  Functional Mobility:  Affecting participation in ambulation and standing   · Squat:  · Steps:  Balance:    Single Leg Stance: R= <15 seconds/ L= <15 seconds  TUG 6.01  Sit to stand: 15   Body Structures Involved:  1. Nerves  2. Bones  3. Joints  4. Muscles  5. Ligaments Body Functions Affected:  1. Sensory/Pain  2. Reproductive  3. Neuromusculoskeletal  4. Movement Related Activities and Participation Affected:  1. Mobility  2. Self Care   Number of elements that affect the Plan of Care: 1-2: LOW COMPLEXITY   CLINICAL PRESENTATION:   Presentation: Stable and uncomplicated: LOW COMPLEXITY         Use of outcome tool(s) and clinical judgement create a POC that gives a: Clear prediction of patient's progress: LOW COMPLEXITY   See treatment note for associated treatment provided today.     Future Appointments   Date Time Provider Yoon Suarez   5/15/2020  9:00 AM Kelsy Wells PT [facility] AND [facility]   5/20/2020 10:15 AM Ophelia Lynch MD [facility]         Rigo Hill PT

## 2020-05-11 NOTE — PROGRESS NOTES
Monroe Darnell  : 1945  [place] / Product Type: Medicare /  [address] [facility] at [address], [address] [place] [address]  [phone]   [phone]                                                          Tari Levine MD      OUTPATIENT PHYSICAL THERAPY: Daily Treatment Note 2020 Visit Count:  5    ICD 10:  Pain in right hip (M25.551)  Stiffness of right hip, not elsewhere classified (M25.651)  Presence of right artificial hip joint (B84.132)        Pre-treatment Symptoms/Complaints:  Please See Re-Certification note dated 2020 for more details. Pain: Initial:0/10 Post Session: 0/10   Medications Last Reviewed:  2020     Updated Objective Findings: Please see Re-Certification  dated 2020 for more details. TREATMENT:   THERAPEUTIC EXERCISE: (28 minutes):  Exercises per grid below to improve mobility, strength and balance. Required minimal visual, verbal and manual cues to promote proper body alignment and promote proper body posture. Progressed resistance and complexity of movement as indicated. Date:  3/11/2020 Date:  3/13/2020 Date:  3/16/2020 Date  3/17/2020 Date   2020   Activity/Exercise Parameters Parameters Parameters     Education HEP, POC, PT goals, anatomy/pathology, PT rationale, education, rehab potential and prognosis Educated on the importance or    Overview of HEP with updating lower complexity exercises. Hip Abduction 30xs 30xs 30xs S/L 30xs 30xs   Hip extension 30xs 30xs 30xs 30xs 30xs   bridges    30xs 20 x blue band   Hip hikes  30xs jason 30xs      Clams  30xs green 40xs green     Glut min clams  30xs 30xs     Single stance   3 min 45 secons 3xs    calf stretch    3 min    Hamstring stretch    3 min    G;uteal stretch    2 min    nustep     8 min         THERAPEUTIC ACTIVITY: ( 10 minutes):  Activities per gid below to improve functional movement related mobility, strength and balance to improve neuro-muscular carryover to daily functional activities for improving patient's quality of life. Required visual, verbal and manual cues to promote proper body alignment and promote proper body posture/mechanics. Progressed resistance and complexity of movement as indicated. Date:  3/11/2020 Date:  3/13/2020 Date:  3/16/2020 Date     3/17/2020 Date   5/11/2020   Activity/Exercise Parameters Parameters Parameters     Lateral walking   3xs green band 3xs green  4 xs blue band   Monster walks   3xs green band 3xs green  4xs blue band   Shuttle   50lb 3x10  Sl 25 2x10 50lb 3x10  Sl 25 2x10  S/L  2x10 12.5 lbs     CCTKE    30xs purple                                  Gait Training (  0 min):  Gait training to improve and/or restore physical functioning as related to dynamic movement of hip - right to improve functional walking. Ambulated   with independence and minimal visual and verbal cues related to their stance phaseto promote proper body posture. Instruction in performance of hip drop to correct stance phase and stride length. MANUAL THERAPY: (0 minutes): Joint mobilization, Soft tissue mobilization was utilized and necessary because of the patient's restricted joint motion and restricted motion of soft tissue mobility. Date  5/11/2020    Technique Used Grade Level # Time(s) Effect while being performed                                                                 HEP Log Date 1.   2 way hip Abd/ext 3/11/2020   2. Hip hikes 3/11/2020   3.bridges 3/11/2020   4. clams    5. [employer] Portal  Treatment/Session Summary:    Response to Treatment: Please See Certification Note dated 5/11/2020 for more details.    Communication/Consultation:  Faxed Re-certification Note to MD.   Equipment provided today: HEP Handout     Recommendations/Intent for next treatment session:   Next visit will focus on Manual Therapy Core Stability Quad strengthening Hip strengthening soft tissue mobilization. Treatment Plan of Care Effective Dates: 5/11/2020 TO 7/10/2020 (60 days).   Frequency/Duration: 2 times a week for 60 Days             Total Treatment Billable Duration:   38  Rx   PT Patient Time In/Time Out  Time In: 0577  Time Out: 965 Zandra Shipman PT    Future Appointments   Date Time Provider Yoon Suarez   5/20/2020 10:15 AM Amandeep Dozier MD [facility]

## 2020-05-15 ENCOUNTER — HOSPITAL ENCOUNTER (OUTPATIENT)
Dept: PHYSICAL THERAPY | Age: 75
Discharge: HOME | End: 2020-05-15
Payer: MEDICARE

## 2020-05-15 PROCEDURE — 97530 THERAPEUTIC ACTIVITIES: CPT

## 2020-05-15 PROCEDURE — 97110 THERAPEUTIC EXERCISES: CPT

## 2020-05-15 NOTE — PROGRESS NOTES
Mercedez Dorantes  : 1945  [place] / Product Type: Medicare /  Jewel Locket at 4 [facility]. [facility], [address] [place] [address], [address]  [phone]   [phone]                                                          Ran García MD      OUTPATIENT PHYSICAL THERAPY: Daily Treatment Note 5/15/2020 Visit Count:  6    ICD 10:  Pain in right hip (M25.551)  Stiffness of right hip, not elsewhere classified (M25.651)  Presence of right artificial hip joint (B22.585)        Pre-treatment Symptoms/Complaints:  Pt reports that she has been doing well. Pain: Initial:0/10 Post Session: 0/10   Medications Last Reviewed:  5/15/2020     Updated Objective Findings: Positive Trendelenburg        TREATMENT:   THERAPEUTIC EXERCISE: (15 minutes):  Exercises per grid below to improve mobility, strength and balance. Required minimal visual, verbal and manual cues to promote proper body alignment and promote proper body posture. Progressed resistance and complexity of movement as indicated. Date:  3/11/2020 Date:  3/13/2020 Date:  3/16/2020 Date  3/17/2020 Date   2020 Date     5/15/2020   Activity/Exercise Parameters Parameters Parameters      Education HEP, POC, PT goals, anatomy/pathology, PT rationale, education, rehab potential and prognosis Educated on the importance or    Overview of HEP with updating lower complexity exercises. Hip Abduction 30xs 30xs 30xs S/L 30xs 30xs 30xs blue band   Hip extension 30xs 30xs 30xs 30xs 30xs    bridges    30xs 20 x blue band    Hip hikes  30xs jason 30xs    30xs   Clams  30xs green 40xs green      Glut min clams  30xs 30xs      Single stance   3 min 45 secons 3xs     calf stretch    3 min  3 min   Hamstring stretch    3 min     Gluteal stretch    2 min     nustep     8 min 8 min         THERAPEUTIC ACTIVITY: ( 40 minutes):  Activities per gid below to improve functional movement related mobility, strength and balance to improve neuro-muscular carryover to daily functional activities for improving patient's quality of life. Required visual, verbal and manual cues to promote proper body alignment and promote proper body posture/mechanics. Progressed resistance and complexity of movement as indicated. Date:  3/11/2020 Date:  3/13/2020 Date:  3/16/2020 Date     3/17/2020 Date   5/11/2020 Date  5/15/2020   Activity/Exercise Parameters Parameters Parameters      Lateral walking   3xs green band 3xs green  4 xs blue band 4xs blue band   Monster walks   3xs green band 3xs green  4xs blue band 4xs blue band   Shuttle   50lb 3x10  Sl 25 2x10 50lb 3x10  Sl 25 2x10  S/L  2x10 12.5 lbs   50lb 3x10  Sl 25 2x10  S/L  2x10 12.5 lbs   CCTKE    30xs purple   30xs   Sit to stand       30xs                         Gait Training (  0 min):  Gait training to improve and/or restore physical functioning as related to dynamic movement of hip - right to improve functional walking. Ambulated   with independence and minimal visual and verbal cues related to their stance phaseto promote proper body posture. Instruction in performance of hip drop to correct stance phase and stride length. MANUAL THERAPY: (0 minutes): Joint mobilization, Soft tissue mobilization was utilized and necessary because of the patient's restricted joint motion and restricted motion of soft tissue mobility. Date  5/15/2020    Technique Used Grade Level # Time(s) Effect while being performed                                                                 HEP Log Date 1.   2 way hip Abd/ext 3/11/2020   2. Hip hikes 3/11/2020   3.bridges 3/11/2020   4. clams    5.            MedBridge Portal  Treatment/Session Summary:    Response to Treatment: Progressed in strengthening and educated about rest to avoid overuse injuries   Communication/Consultation:  Faxed Re-certification Note to MD.   Equipment provided today: HEP Handout Recommendations/Intent for next treatment session:   Next visit will focus on Manual Therapy Core Stability Quad strengthening Hip strengthening soft tissue mobilization. Treatment Plan of Care Effective Dates: 5/11/2020 TO 7/10/2020 (60 days).   Frequency/Duration: 2 times a week for 60 Days             Total Treatment Billable Duration:   55  Rx   PT Patient Time In/Time Out  Time In: 0902  Time Out: [facility], PT    Future Appointments   Date Time Provider Yoon Suarez   5/20/2020 10:15 AM Chalino Hendrickson MD [facility] HTF

## 2020-05-18 ENCOUNTER — HOSPITAL ENCOUNTER (OUTPATIENT)
Dept: PHYSICAL THERAPY | Age: 75
Discharge: HOME | End: 2020-05-18
Payer: MEDICARE

## 2020-05-18 PROCEDURE — 97110 THERAPEUTIC EXERCISES: CPT

## 2020-05-18 PROCEDURE — 97530 THERAPEUTIC ACTIVITIES: CPT

## 2020-05-18 NOTE — PROGRESS NOTES
Sunitha Kitchen  : 1945  [place] / Product Type: Medicare /  [address] [facility] at [address] [place] [address]. [facility], [address] [place] [address]  [phone]   [phone]                                                          Ambrose Simmonds, MD      OUTPATIENT PHYSICAL THERAPY: Daily Treatment Note 2020 Visit Count:  7    ICD 10:  Pain in right hip (M25.551)  Stiffness of right hip, not elsewhere classified (M25.651)  Presence of right artificial hip joint (Z96.641)        Pre-treatment Symptoms/Complaints:  Pt reports that she has some soreness with walking and sleeping but the soreness was very manageable. Pain: Initial:0/10 Post Session: 0/10   Medications Last Reviewed:  2020     Updated Objective Findings: Point tenderness on achilles insertion. TREATMENT:   THERAPEUTIC EXERCISE: (23 minutes):  Exercises per grid below to improve mobility, strength and balance. Required minimal visual, verbal and manual cues to promote proper body alignment and promote proper body posture. Progressed resistance and complexity of movement as indicated. Date:  3/13/2020 Date:  3/16/2020 Date  3/17/2020 Date   2020 Date     5/15/2020 Date    2020   Activity/Exercise Parameters Parameters       Education Educated on the importance or    Overview of HEP with updating lower complexity exercises. Hip Abduction 30xs 30xs S/L 30xs 30xs 30xs blue band 30xs blue band   Hip extension 30xs 30xs 30xs 30xs     bridges   30xs 20 x blue band  30xs   Hip hikes 30xs jason 30xs    30xs 30xs   Clams 30xs green 40xs green       Glut min clams 30xs 30xs       Single stance  3 min 45 secons 3xs      calf stretch   3 min  3 min 3  min   Hamstring stretch   3 min      Gluteal stretch   2 min      nustep    8 min 8 min 8 min   Toe raise      30xs   bridge      3x10                  THERAPEUTIC ACTIVITY: ( 33 minutes):  Activities per gid below to improve functional movement related mobility, strength and balance to improve neuro-muscular carryover to daily functional activities for improving patient's quality of life. Required visual, verbal and manual cues to promote proper body alignment and promote proper body posture/mechanics. Progressed resistance and complexity of movement as indicated. Date:  3/13/2020 Date:  3/16/2020 Date     3/17/2020 Date   5/11/2020 Date    5/15/2020 Date  5/18/2020   Activity/Exercise Parameters Parameters       Lateral walking 3xs green band 3xs green  4 xs blue band 4xs blue band 3xs blue band   Monster walks 3xs green band 3xs green  4xs blue band 4xs blue band 3xs blue band   Shuttle 50lb 3x10  Sl 25 2x10 50lb 3x10  Sl 25 2x10  S/L  2x10 12.5 lbs   50lb 3x10  Sl 25 2x10  S/L  2x10 12.5 lbs 50lb 3x10  Sl 25 3 x10  S/L  2x10 12.5 lbs   CCTKE  30xs purple   30xs    Sit to stand     30xs 30xs blue band in normal chair                       Gait Training (  0 min):  Gait training to improve and/or restore physical functioning as related to dynamic movement of hip - right to improve functional walking. Ambulated   with independence and minimal visual and verbal cues related to their stance phaseto promote proper body posture. Instruction in performance of hip drop to correct stance phase and stride length. MANUAL THERAPY: (0 minutes): Joint mobilization, Soft tissue mobilization was utilized and necessary because of the patient's restricted joint motion and restricted motion of soft tissue mobility. Date  5/18/2020    Technique Used Grade Level # Time(s) Effect while being performed                                                                 HEP Log Date 1.   2 way hip Abd/ext 3/11/2020   2. Hip hikes 3/11/2020   3.bridges 3/11/2020   4. clams    5. MedBridge Portal  Treatment/Session Summary:    Response to Treatment: Pt continued with hip strengthening and glut med activation. No pain with exercises but mild discomfort in heel. Communication/Consultation:  Faxed Re-certification Note to MD.   Equipment provided today: HEP Handout     Recommendations/Intent for next treatment session:   Next visit will focus on Manual Therapy Core Stability Quad strengthening Hip strengthening soft tissue mobilization. Treatment Plan of Care Effective Dates: 5/11/2020 TO 7/10/2020 (60 days).   Frequency/Duration: 2 times a week for 60 Days             Total Treatment Billable Duration:   56  Rx   PT Patient Time In/Time Out  Time In: 1450  Time Out: [address], PT    Future Appointments   Date Time Provider Yoon Suarez   5/20/2020 10:15 AM Kasey Donaldson MD [facility] HTF   5/21/2020  9:15 AM Rajeev Elton, PT [facility]   5/27/2020  9:30 AM Rajeev Elton, PT [facility]   5/29/2020  9:15 AM Rajeev Elton, PT [facility]   6/2/2020  9:15 AM Rajeev Elton, PT [facility]   6/5/2020  9:00 AM Rajeev Elton, PT [facility]   6/9/2020  9:15 AM Rajeev Elton, PT [facility]   6/12/2020  9:45 AM Rajeev Elton, PT [facility]   6/16/2020  9:30 AM Rajeev Elton, PT [facility]

## 2020-05-21 ENCOUNTER — HOSPITAL ENCOUNTER (OUTPATIENT)
Dept: PHYSICAL THERAPY | Age: 75
Discharge: HOME | End: 2020-05-21
Payer: MEDICARE

## 2020-05-21 PROCEDURE — 97110 THERAPEUTIC EXERCISES: CPT

## 2020-05-21 PROCEDURE — 97530 THERAPEUTIC ACTIVITIES: CPT

## 2020-05-21 NOTE — PROGRESS NOTES
Bethel Castleman  : 1945  [place] / Product Type: Medicare /  [address] [facility] at [address] [place] [address]. [facility], [address] [place] [address]  [phone]   [phone]                                                          Annamarie Estrada MD      OUTPATIENT PHYSICAL THERAPY: Daily Treatment Note 2020 Visit Count:  8    ICD 10:  Pain in right hip (M25.551)  Stiffness of right hip, not elsewhere classified (M25.651)  Presence of right artificial hip joint (Y43.899)        Pre-treatment Symptoms/Complaints:  Pt reports that her hip has been feeling fine and she has mild soreness in her calf. Pain: Initial:0/10 Post Session: 0/10   Medications Last Reviewed:  2020     Updated Objective Findings: Point tenderness on achilles insertion. TREATMENT:   THERAPEUTIC EXERCISE: (20 minutes):  Exercises per grid below to improve mobility, strength and balance. Required minimal visual, verbal and manual cues to promote proper body alignment and promote proper body posture. Progressed resistance and complexity of movement as indicated. Date:  3/16/2020 Date  3/17/2020 Date   2020 Date     5/15/2020 Date    2020 Date     2020   Activity/Exercise Parameters        Education   Overview of HEP with updating lower complexity exercises. Hip Abduction 30xs S/L 30xs 30xs 30xs blue band 30xs blue band 30xs blue band   Hip extension 30xs 30xs 30xs   30xs blue band   bridges  30xs 20 x blue band  30xs    Hip hikes 30xs    30xs 30xs 30xs   Clams 40xs green        Glut min clams 30xs        Single stance 3 min 45 secons 3xs       calf stretch  3 min  3 min 3  min 3 min   Hamstring stretch  3 min       Gluteal stretch  2 min       nustep   8 min 8 min 8 min 8 min   Toe raise     30xs 30xs   bridge     3x10    Calf raise and post tib activation      30xs         THERAPEUTIC ACTIVITY: ( 38 minutes):  Activities per gid below to improve functional movement related mobility, strength and balance to improve neuro-muscular carryover to daily functional activities for improving patient's quality of life. Required visual, verbal and manual cues to promote proper body alignment and promote proper body posture/mechanics. Progressed resistance and complexity of movement as indicated. Date:  3/16/2020 Date     3/17/2020 Date   5/11/2020 Date    5/15/2020 Date  5/18/2020 Date  5/21/2020   Activity/Exercise Parameters        Lateral walking 3xs green  4 xs blue band 4xs blue band 3xs blue band 4xs blue band   Monster walks 3xs green  4xs blue band 4xs blue band 3xs blue band 4xs blue band   Shuttle 50lb 3x10  Sl 25 2x10  S/L  2x10 12.5 lbs   50lb 3x10  Sl 25 2x10  S/L  2x10 12.5 lbs 50lb 3x10  Sl 25 3 x10  S/L  2x10 12.5 lbs 75 lb 3x 6  Sl 50 3 x 6  S/L  2x10 12.5 lbs   CCTKE 30xs purple   30xs     Sit to stand    30xs 30xs blue band in normal chair 30xs blue band in normal chair   Side step with squats      15xs                MANUAL THERAPY: (0 minutes): Joint mobilization, Soft tissue mobilization was utilized and necessary because of the patient's restricted joint motion and restricted motion of soft tissue mobility. Date  5/21/2020    Technique Used Grade Level # Time(s) Effect while being performed                                                                 HEP Log Date 1.   2 way hip Abd/ext 3/11/2020   2. Hip hikes 3/11/2020   3.bridges 3/11/2020   4. clams    5. MedBridge Portal  Treatment/Session Summary:    Response to Treatment: Pt continued with strengthenign but still favors the right side with squatting mechanics resulting in a twist to limit hip flexion. Verbal and manual cueing improved rotation.    Communication/Consultation:  none   Equipment provided today: HEP Handout     Recommendations/Intent for next treatment session:   Next visit will focus on Manual Therapy Core Stability Quad strengthening Hip strengthening soft tissue mobilization. Treatment Plan of Care Effective Dates: 5/11/2020 TO 7/10/2020 (60 days).   Frequency/Duration: 2 times a week for 60 Days             Total Treatment Billable Duration:   58  Rx   PT Patient Time In/Time Out  Time In: 0912  Time Out: [address], PT    Future Appointments   Date Time Provider Yoon Daniela   5/27/2020  9:30 AM Algis Sera, PT [facility]   5/29/2020  9:15 AM Algis Sera, PT [facility]   6/2/2020  9:15 AM Algis Sera, PT [facility]   6/5/2020  9:00 AM Algis Sera, PT [facility]   6/9/2020  9:15 AM Algis Sera, PT [facility]   6/12/2020  9:45 AM Algis Sera, PT [facility]   6/16/2020  9:30 AM Algis Sera, PT [facility]   11/18/2020  9:00 AM Miguelina Mead MD [facility] HT   5/21/2021  9:15 AM Trini Lynch MD [facility] HTF

## 2020-05-27 ENCOUNTER — HOSPITAL ENCOUNTER (OUTPATIENT)
Dept: PHYSICAL THERAPY | Age: 75
Discharge: HOME | End: 2020-05-27
Payer: MEDICARE

## 2020-05-27 PROCEDURE — 97530 THERAPEUTIC ACTIVITIES: CPT

## 2020-05-27 PROCEDURE — 97110 THERAPEUTIC EXERCISES: CPT

## 2020-05-27 NOTE — PROGRESS NOTES
Rodo Johnson  : 1945  [place] / Product Type: Medicare /  [address] [facility] at [address] [place] [address]. [facility], [address] [place] [address], [address]  [phone]   [phone]                                                          Yovany Raymundo MD      OUTPATIENT PHYSICAL THERAPY: Daily Treatment Note 2020 Visit Count:  9    ICD 10:  Pain in right hip (M25.551)  Stiffness of right hip, not elsewhere classified (M25.651)  Presence of right artificial hip joint (Z96.641)        Pre-treatment Symptoms/Complaints:  Pt reports some soreness after wrking out but some days that feel good for her hell and ankle. Pain: Initial:0/10 Post Session: 0/10   Medications Last Reviewed:  2020     Updated Objective Findings: Point tenderness on achilles insertion. TREATMENT:   THERAPEUTIC EXERCISE: (12 minutes):  Exercises per grid below to improve mobility, strength and balance. Required minimal visual, verbal and manual cues to promote proper body alignment and promote proper body posture. Progressed resistance and complexity of movement as indicated. Date  3/17/2020 Date   2020 Date     5/15/2020 Date    2020 Date     2020 Date   2020   Activity/Exercise         Education  Overview of HEP with updating lower complexity exercises. Hip Abduction S/L 30xs 30xs 30xs blue band 30xs blue band 30xs blue band 30xs blue band   Hip extension 30xs 30xs   30xs blue band 30xs blue band   bridges 30xs 20 x blue band  30xs     Hip hikes   30xs 30xs 30xs 30xs   Clams         Glut min clams         Single stance 45 secons 3xs        calf stretch 3 min  3 min 3  min 3 min    Hamstring stretch 3 min        Gluteal stretch 2 min        nustep  8 min 8 min 8 min 8 min 8 min   Toe raise    30xs 30xs    bridge    3x10     Calf raise and post tib activation     30xs          THERAPEUTIC ACTIVITY: ( 42 minutes):  Activities per gid below to improve functional movement related mobility, strength and balance to improve neuro-muscular carryover to daily functional activities for improving patient's quality of life. Required visual, verbal and manual cues to promote proper body alignment and promote proper body posture/mechanics. Progressed resistance and complexity of movement as indicated. Date     3/17/2020 Date   5/11/2020 Date    5/15/2020 Date  5/18/2020 Date  5/21/2020 Date  5/27/2020   Activity/Exercise         Lateral walking  4 xs blue band 4xs blue band 3xs blue band 4xs blue band 4xs blue band   Monster walks  4xs blue band 4xs blue band 3xs blue band 4xs blue band 4xs blue band   Shuttle   50lb 3x10  Sl 25 2x10  S/L  2x10 12.5 lbs 50lb 3x10  Sl 25 3 x10  S/L  2x10 12.5 lbs 75 lb 3x 6  Sl 50 3 x 6  S/L  2x10 12.5 lbs 75 lb 3x 8  Sl 50 3x 8  S/L  2x10 25 lbs   CCTKE   30xs      Sit to stand   30xs 30xs blue band in normal chair 30xs blue band in normal chair 30xs blue band in normal chair   Side step with squats     15xs 15xs   Balanced baord      3 min   Toe walk      3xs 20 ft         3xs 20 ft       MANUAL THERAPY: (0 minutes): Joint mobilization, Soft tissue mobilization was utilized and necessary because of the patient's restricted joint motion and restricted motion of soft tissue mobility. Date  5/27/2020    Technique Used Grade Level # Time(s) Effect while being performed                                                                 HEP Log Date 1.   2 way hip Abd/ext 3/11/2020   2. Hip hikes 3/11/2020   3.bridges 3/11/2020   4. clams    5. MedBridge Portal  Treatment/Session Summary:    Response to Treatment: Progressed closed chain exercises and workong on calf raises below neutral to neutral. No pain with exercioses but still favors left leg with squatting functions.    Communication/Consultation:  none   Equipment provided today: HEP Handout     Recommendations/Intent for next treatment session: Next visit will focus on Manual Therapy Core Stability Quad strengthening Hip strengthening soft tissue mobilization. Treatment Plan of Care Effective Dates: 5/11/2020 TO 7/10/2020 (60 days).   Frequency/Duration: 2 times a week for 60 Days             Total Treatment Billable Duration:   54  Rx   PT Patient Time In/Time Out  Time In: 5870  Time Out: [address], PT    Future Appointments   Date Time Provider Yoon Suarez   5/29/2020  9:15 AM Kimberly Cho, PT [facility]   6/2/2020  9:15 AM Kimberly Cho, PT [facility]   6/5/2020  9:00 AM Kimberly Cho, PT [facility]   6/9/2020  9:15 AM Kimberly Cho, PT [facility]   6/12/2020  9:45 AM Kimberly Cho, PT [facility]   6/16/2020  9:30 AM Kimberly Cho, PT [facility]   11/18/2020  9:00 AM Frank Greer MD [facility] HTF   5/21/2021  9:15 AM Marianne Lynch MD [facility] HTF

## 2020-05-29 ENCOUNTER — HOSPITAL ENCOUNTER (OUTPATIENT)
Dept: PHYSICAL THERAPY | Age: 75
Discharge: HOME | End: 2020-05-29
Payer: MEDICARE

## 2020-05-29 PROCEDURE — 97530 THERAPEUTIC ACTIVITIES: CPT

## 2020-05-29 PROCEDURE — 97110 THERAPEUTIC EXERCISES: CPT

## 2020-05-29 NOTE — PROGRESS NOTES
Delroy Fernandez  : 1945  [place] / Product Type: Medicare /  Sophia Pimentel at 4 [facility]. [facility], [address] [place] [address], [address]  [phone]   [phone]                                                          Manuel Smith MD      OUTPATIENT PHYSICAL THERAPY: Daily Treatment Note 2020 Visit Count:  10    ICD 10:  Pain in right hip (M25.551)  Stiffness of right hip, not elsewhere classified (M25.651)  Presence of right artificial hip joint (T55.023)        Pre-treatment Symptoms/Complaints:  Pt reports that her hip and heel has soreness but sometimes her heel feels alot better. Pain: Initial:0/10 Post Session: 0/10   Medications Last Reviewed:  2020     Updated Objective Findings: Point tenderness on achilles insertion. TREATMENT:   THERAPEUTIC EXERCISE: (16 minutes):  Exercises per grid below to improve mobility, strength and balance. Required minimal visual, verbal and manual cues to promote proper body alignment and promote proper body posture. Progressed resistance and complexity of movement as indicated. Date   2020 Date     5/15/2020 Date    2020 Date     2020 Date   2020 Date  2020   Activity/Exercise         Education Overview of HEP with updating lower complexity exercises.         Hip Abduction 30xs 30xs blue band 30xs blue band 30xs blue band 30xs blue band 30xs blue band   Hip extension 30xs   30xs blue band 30xs blue band 30xs blue band   bridges 20 x blue band  30xs      Hip hikes  30xs 30xs 30xs 30xs 30xs   Clams         Glut min clams         Single stance         calf stretch  3 min 3  min 3 min     Hamstring stretch         Gluteal stretch         nustep 8 min 8 min 8 min 8 min 8 min 8  min   Toe raise   30xs 30xs     bridge   3x10      Calf raise and post tib activation    30xs  30xs   Calf raise       30xs         THERAPEUTIC ACTIVITY: ( 39 minutes): Activities per gid below to improve functional movement related mobility, strength and balance to improve neuro-muscular carryover to daily functional activities for improving patient's quality of life. Required visual, verbal and manual cues to promote proper body alignment and promote proper body posture/mechanics. Progressed resistance and complexity of movement as indicated. Date   5/11/2020 Date    5/15/2020 Date  5/18/2020 Date  5/21/2020 Date  5/27/2020 Date  5/29/2020   Activity/Exercise         Lateral walking 4 xs blue band 4xs blue band 3xs blue band 4xs blue band 4xs blue band 4xs blue band   Monster walks 4xs blue band 4xs blue band 3xs blue band 4xs blue band 4xs blue band 4xs blue band   Shuttle  50lb 3x10  Sl 25 2x10  S/L  2x10 12.5 lbs 50lb 3x10  Sl 25 3 x10  S/L  2x10 12.5 lbs 75 lb 3x 6  Sl 50 3 x 6  S/L  2x10 12.5 lbs 75 lb 3x 8  Sl 50 3x 8  S/L  2x10 25 lbs 75 lb 3x 10  Sl 50 3x 10  S/L  2x10 25 lbs   CCTKE  30xs       Sit to stand  30xs 30xs blue band in normal chair 30xs blue band in normal chair 30xs blue band in normal chair 30xs blue band in normal chair   Side step with squats    15xs 15xs    Balanced baord     3 min 3 min   Toe walk     3xs 20 ft 3xs 20 ft   Heel walk     3xs 20 ft 3xs 20 ft   Stool scoots      15ft 3 xs       MANUAL THERAPY: (0 minutes): Joint mobilization, Soft tissue mobilization was utilized and necessary because of the patient's restricted joint motion and restricted motion of soft tissue mobility. Date  5/29/2020    Technique Used Grade Level # Time(s) Effect while being performed                                                                 HEP Log Date 1.   2 way hip Abd/ext 3/11/2020   2. Hip hikes 3/11/2020   3.bridges 3/11/2020   4. clams    5. [employer] Portal  Treatment/Session Summary:    Response to Treatment: Tolerated treatment well and continud with hipo and ankle strengthening.  NO pain with exercises but mild tenderness when laying on her side. Communication/Consultation:  none   Equipment provided today: HEP Handout     Recommendations/Intent for next treatment session:   Next visit will focus on Manual Therapy Core Stability Quad strengthening Hip strengthening soft tissue mobilization. Treatment Plan of Care Effective Dates: 5/11/2020 TO 7/10/2020 (60 days).   Frequency/Duration: 2 times a week for 60 Days             Total Treatment Billable Duration:   55  Rx   PT Patient Time In/Time Out  Time In: 3687  Time Out: [facility]    Future Appointments   Date Time Provider Yoon Suarez   6/2/2020  9:15 AM Roque Merino, PT [facility]   6/5/2020  9:00 AM Roque Merino, PT [facility]   6/9/2020  9:15 AM Roque Merino, PT [facility]   6/12/2020  9:45 AM Roque Merino, PT [facility]   6/16/2020  9:30 AM Roque Merino, PT [facility]   11/18/2020  9:00 AM Farzana Diaz MD [facility] HTF HTF   5/21/2021  9:15 AM Beltran Lynch MD [facility] HTF

## 2020-06-02 ENCOUNTER — HOSPITAL ENCOUNTER (OUTPATIENT)
Dept: PHYSICAL THERAPY | Age: 75
Discharge: HOME | End: 2020-06-02
Payer: MEDICARE

## 2020-06-02 PROCEDURE — 97530 THERAPEUTIC ACTIVITIES: CPT

## 2020-06-02 PROCEDURE — 97110 THERAPEUTIC EXERCISES: CPT

## 2020-06-02 NOTE — PROGRESS NOTES
Bridger Valdez  : 1945  [place] / Product Type: Medicare /  [address] [facility] at [address] [place] [address]. [facility], [address] [place] [address]  [phone]   [phone]                                                          Veronica Moreno MD      OUTPATIENT PHYSICAL THERAPY: Daily Treatment Note 2020 Visit Count:  11    ICD 10:  Pain in right hip (M25.551)  Stiffness of right hip, not elsewhere classified (M25.651)  Presence of right artificial hip joint (B27.695)        Pre-treatment Symptoms/Complaints:  Pt reports that she is doing well and had some soreness on her ankle but her hip is doing well. Pain: Initial:0/10 Post Session: 0/10   Medications Last Reviewed:  2020     Updated Objective Findings: Point tenderness on achilles insertion. TREATMENT:   THERAPEUTIC EXERCISE: (12 minutes):  Exercises per grid below to improve mobility, strength and balance. Required minimal visual, verbal and manual cues to promote proper body alignment and promote proper body posture. Progressed resistance and complexity of movement as indicated. Date    2020 Date     2020 Date   2020 Date  2020 Date  2020   Activity/Exercise        Education        Hip Abduction 30xs blue band 30xs blue band 30xs blue band 30xs blue band    Hip extension  30xs blue band 30xs blue band 30xs blue band    bridges 30xs       Hip hikes 30xs 30xs 30xs 30xs    Clams        Glut min clams        Single stance        calf stretch 3  min 3 min      Hamstring stretch        Gluteal stretch        nustep 8 min 8 min 8 min 8  min 8 min   Toe raise 30xs 30xs      bridge 3x10       Calf raise and post tib activation  30xs  30xs 30   Calf raise     30xs 30         THERAPEUTIC ACTIVITY: ( 41 minutes):  Activities per gid below to improve functional movement related mobility, strength and balance to improve neuro-muscular carryover to daily functional activities for improving patient's quality of life. Required visual, verbal and manual cues to promote proper body alignment and promote proper body posture/mechanics. Progressed resistance and complexity of movement as indicated. Date  5/18/2020 Date  5/21/2020 Date  5/27/2020 Date  5/29/2020 Date  6/2/2020   Activity/Exercise        Lateral walking 3xs blue band 4xs blue band 4xs blue band 4xs blue band 4xs blue band   Monster walks 3xs blue band 4xs blue band 4xs blue band 4xs blue band 4xs blue band   Shuttle 50lb 3x10  Sl 25 3 x10  S/L  2x10 12.5 lbs 75 lb 3x 6  Sl 50 3 x 6  S/L  2x10 12.5 lbs 75 lb 3x 8  Sl 50 3x 8  S/L  2x10 25 lbs 75 lb 3x 10  Sl 50 3x 10  S/L  2x10 25 lbs 125 lb 3x 8  Sl 75 3x 8  S/L  2x10 50 lbs   CCTKE        Sit to stand 30xs blue band in normal chair 30xs blue band in normal chair 30xs blue band in normal chair 30xs blue band in normal chair 30xs blue band in normal chair   Side step with squats  15xs 15xs  15xs   Balanced baord   3 min 3 min    Toe walk   3xs 20 ft 3xs 20 ft 3xs 20 ft   Heel walk   3xs 20 ft 3xs 20 ft 3xs 20 ft   Stool scoots    15ft 4 xs 15ft 4 xs       MANUAL THERAPY: (0 minutes): Joint mobilization, Soft tissue mobilization was utilized and necessary because of the patient's restricted joint motion and restricted motion of soft tissue mobility. Date  6/2/2020    Technique Used Grade Level # Time(s) Effect while being performed                                                                 HEP Log Date 1.   2 way hip Abd/ext 3/11/2020   2. Hip hikes 3/11/2020   3.bridges 3/11/2020   4. clams    5. MedBridge Portal  Treatment/Session Summary:    Response to Treatment: Pt continued with strengthening to improve hip and ankle strength. No pain with exercises and mild soreness post exercises.    Communication/Consultation:  none   Equipment provided today: HEP Handout     Recommendations/Intent for next treatment session:   Next visit will focus on Manual Therapy Core Stability Quad strengthening Hip strengthening soft tissue mobilization. Treatment Plan of Care Effective Dates: 5/11/2020 TO 7/10/2020 (60 days).   Frequency/Duration: 2 times a week for 60 Days             Total Treatment Billable Duration:   53  Rx   PT Patient Time In/Time Out  Time In: 0910  Time Out: [address] Marie Neff, PT    Future Appointments   Date Time Provider Yoon Pachecoisti   6/5/2020  9:00 AM Cookie Branham, PT [facility] AND [facility]   6/9/2020  9:15 AM Cookie Branham, PT [facility]   6/12/2020  9:45 AM [facility]   6/16/2020  9:30 AM [facility]   11/18/2020  9:00 AM Albert Cooper MD [facility]   5/21/2021  9:15 AM Shirley Lynch MD [facility]

## 2020-06-05 ENCOUNTER — HOSPITAL ENCOUNTER (OUTPATIENT)
Dept: PHYSICAL THERAPY | Age: 75
Discharge: HOME | End: 2020-06-05
Payer: MEDICARE

## 2020-06-05 PROCEDURE — 97530 THERAPEUTIC ACTIVITIES: CPT

## 2020-06-05 PROCEDURE — 97110 THERAPEUTIC EXERCISES: CPT

## 2020-06-05 NOTE — PROGRESS NOTES
Angel Harrison  : 1945  [place] / Product Type: Medicare /  [facility] at [facility]. [facility], [address] [place] [address]  [phone]   [phone]                                                          Kurtis Martino MD      OUTPATIENT PHYSICAL THERAPY: Daily Treatment Note 2020 Visit Count:  12    ICD 10:  Pain in right hip (M25.551)  Stiffness of right hip, not elsewhere classified (M25.651)  Presence of right artificial hip joint (S05.250)        Pre-treatment Symptoms/Complaints:  Pt reports that her ankle still feels sore on some days but its getting better. Pain: Initial:0/10 Post Session: 0/10   Medications Last Reviewed:  2020     Updated Objective Findings: improved trendelenburg         TREATMENT:   THERAPEUTIC EXERCISE: (12 minutes):  Exercises per grid below to improve mobility, strength and balance. Required minimal visual, verbal and manual cues to promote proper body alignment and promote proper body posture. Progressed resistance and complexity of movement as indicated. Date     2020 Date   2020 Date  2020 Date  2020 Date  2020   Activity/Exercise        Education        Hip Abduction 30xs blue band 30xs blue band 30xs blue band  30xs   Hip extension 30xs blue band 30xs blue band 30xs blue band  30xs   bridges        Hip hikes 30xs 30xs 30xs     Clams        Glut min clams        Single stance        calf stretch 3 min       Hamstring stretch        Gluteal stretch        nustep 8 min 8 min 8  min 8 min 8 min   Toe raise 30xs       bridge        Calf raise and post tib activation 30xs  30xs 30 30 1 inch step   Calf raise    30xs 30 30 1 inch step         THERAPEUTIC ACTIVITY: ( 43 minutes):  Activities per gid below to improve functional movement related mobility, strength and balance to improve neuro-muscular carryover to daily functional activities for improving patient's quality of life. Required visual, verbal and manual cues to promote proper body alignment and promote proper body posture/mechanics. Progressed resistance and complexity of movement as indicated. Date  5/21/2020 Date  5/27/2020 Date  5/29/2020 Date  6/2/2020 Date  6/5/2020   Activity/Exercise        Lateral walking 4xs blue band 4xs blue band 4xs blue band 4xs blue band 4xs blue band   Monster walks 4xs blue band 4xs blue band 4xs blue band 4xs blue band 4xs blue band   Shuttle 75 lb 3x 6  Sl 50 3 x 6  S/L  2x10 12.5 lbs 75 lb 3x 8  Sl 50 3x 8  S/L  2x10 25 lbs 75 lb 3x 10  Sl 50 3x 10  S/L  2x10 25 lbs 125 lb 3x 8  Sl 75 3x 8  S/L  2x10 50 lbs 125 lb 3x 8  Sl 100   3x 8  S/L  2x10 50 lbs\   CCTKE        Sit to stand 30xs blue band in normal chair 30xs blue band in normal chair 30xs blue band in normal chair 30xs blue band in normal chair 30xs blue band in normal chair   15llbs goblet quats   Side step with squats 15xs 15xs  15xs 25xs   Balanced baord  3 min 3 min     Toe walk  3xs 20 ft 3xs 20 ft 3xs 20 ft 3xs 20 ft   Heel walk  3xs 20 ft 3xs 20 ft 3xs 20 ft 3xs 20 ft   Stool scoots   15ft 4 xs 15ft 4 xs 15ft 4 xs   Three way taps     10 xs each side       MANUAL THERAPY: (0 minutes): Joint mobilization, Soft tissue mobilization was utilized and necessary because of the patient's restricted joint motion and restricted motion of soft tissue mobility. Date  6/5/2020    Technique Used Grade Level # Time(s) Effect while being performed                                                                 HEP Log Date 1.   2 way hip Abd/ext 3/11/2020   2. Hip hikes 3/11/2020   3.bridges 3/11/2020   4. clams    5. MedBridge Portal  Treatment/Session Summary:    Response to Treatment: Pt continued with strengthening and progressed in closed chain activities.  No pain with exercises and patient had imporved tolerance to exercise   Communication/Consultation:  none   Equipment provided today: HEP Handout     Recommendations/Intent for next treatment session:   Next visit will focus on Manual Therapy Core Stability Quad strengthening Hip strengthening soft tissue mobilization. Treatment Plan of Care Effective Dates: 5/11/2020 TO 7/10/2020 (60 days).   Frequency/Duration: 2 times a week for 60 Days             Total Treatment Billable Duration:   55  Rx   PT Patient Time In/Time Out  Time In: 0900  Time Out: Juliano Urrutia, DYLAN    Future Appointments   Date Time Provider Yoon Suarez   6/9/2020  9:15 AM Avis Ruiz PT [facility] AND [facility]   6/12/2020  9:45 AM Avis Ruiz PT [facility]   6/16/2020  9:30 AM Avis Ruiz PT [facility]   11/18/2020  9:00 AM Los Davis MD [facility]   5/21/2021  9:15 AM Gavi Lynch MD [facility]

## 2020-06-08 NOTE — PROGRESS NOTES
I am accessing Ms. Renteria's chart as a part of our department's internal chart auditing process. I certify that Ms. Lorene Saenz is, or was, a patient in our department.   Thank you,  Hung Gautam  6/8/2020

## 2020-06-09 ENCOUNTER — HOSPITAL ENCOUNTER (OUTPATIENT)
Dept: PHYSICAL THERAPY | Age: 75
Discharge: HOME | End: 2020-06-09
Payer: MEDICARE

## 2020-06-09 PROCEDURE — 97530 THERAPEUTIC ACTIVITIES: CPT

## 2020-06-09 PROCEDURE — 97110 THERAPEUTIC EXERCISES: CPT

## 2020-06-09 NOTE — PROGRESS NOTES
Gerry Ferrell  : 1945  [place] / Product Type: Medicare /  [id number] at [address]. [facility], [address] [place] [address], [facility], [address] [place] [address]  [phone]   [phone]                                                          Clois Cabot, MD      OUTPATIENT PHYSICAL THERAPY: Daily Treatment Note 2020 Visit Count:  13    ICD 10:  Pain in right hip (M25.551)  Stiffness of right hip, not elsewhere classified (M25.651)  Presence of right artificial hip joint (R60.383)        Pre-treatment Symptoms/Complaints:  Pt reports that her ankle is bothering her but it still feels better than when she started. Pain: Initial:0/10 Post Session: 0/10   Medications Last Reviewed:  2020     Updated Objective Findings: improved trendelenburg         TREATMENT:   THERAPEUTIC EXERCISE: (14 minutes):  Exercises per grid below to improve mobility, strength and balance. Required minimal visual, verbal and manual cues to promote proper body alignment and promote proper body posture. Progressed resistance and complexity of movement as indicated. Date   2020 Date  2020 Date  2020 Date  2020 Date  2020   Activity/Exercise        Education        Hip Abduction 30xs blue band 30xs blue band  30xs    Hip extension 30xs blue band 30xs blue band  30xs    bridges        Hip hikes 30xs 30xs   30xs 2 inch step   Clams        Glut min clams        Single stance        calf stretch        Hamstring stretch        Gluteal stretch        nustep 8 min 8  min 8 min 8 min 9 min   Toe raise        bridge        Calf raise and post tib activation  30xs 30 30 1 inch step 30xs inch step slow 4-2-4   Calf raise   30xs 30 30 1 inch step          THERAPEUTIC ACTIVITY: ( 42 minutes):  Activities per gid below to improve functional movement related mobility, strength and balance to improve neuro-muscular carryover to daily functional activities for improving patient's quality of life. Required visual, verbal and manual cues to promote proper body alignment and promote proper body posture/mechanics. Progressed resistance and complexity of movement as indicated. Date  5/27/2020 Date  5/29/2020 Date  6/2/2020 Date  6/5/2020 Date  6/9/2020   Activity/Exercise        Lateral walking 4xs blue band 4xs blue band 4xs blue band 4xs blue band 4xs blue band   Monster walks 4xs blue band 4xs blue band 4xs blue band 4xs blue band 4xs blue band   Shuttle 75 lb 3x 8  Sl 50 3x 8  S/L  2x10 25 lbs 75 lb 3x 10  Sl 50 3x 10  S/L  2x10 25 lbs 125 lb 3x 8  Sl 75 3x 8  S/L  2x10 50 lbs 125 lb 3x 8  Sl 100   3x 8  S/L  2x10 50 lbs 125 lb 3x 8  Sl 100   3x 8  S/L  2x10 50 lbs   CCTKE        Sit to stand 30xs blue band in normal chair 30xs blue band in normal chair 30xs blue band in normal chair 30xs blue band in normal chair   15llbs goblet quats 3x15 15 lbs Goblet squats   Side step with squats 15xs  15xs 25xs    Balanced baord 3 min 3 min   3 min   Toe walk 3xs 20 ft 3xs 20 ft 3xs 20 ft 3xs 20 ft 4xs 20 ft   Heel walk 3xs 20 ft 3xs 20 ft 3xs 20 ft 3xs 20 ft 4xs 20 ft   Stool scoots  15ft 4 xs 15ft 4 xs 15ft 4 xs    Three way taps    10 xs each side        MANUAL THERAPY: (0 minutes): Joint mobilization, Soft tissue mobilization was utilized and necessary because of the patient's restricted joint motion and restricted motion of soft tissue mobility. Date  6/9/2020    Technique Used Grade Level # Time(s) Effect while being performed                                                                 HEP Log Date 1.   2 way hip Abd/ext 3/11/2020   2. Hip hikes 3/11/2020   3.bridges 3/11/2020   4. clams    5. [employer] Portal  Treatment/Session Summary:    Response to Treatment: Pt continued strengthening but eased squatting exercises due to knee pain. improved tolerance to exercises but still has limitation of hip extension.    Communication/Consultation:  none Equipment provided today: HEP Handout     Recommendations/Intent for next treatment session:   Next visit will focus on Manual Therapy Core Stability Quad strengthening Hip strengthening soft tissue mobilization. Treatment Plan of Care Effective Dates: 5/11/2020 TO 7/10/2020 (60 days).   Frequency/Duration: 2 times a week for 60 Days             Total Treatment Billable Duration:   56  Rx   PT Patient Time In/Time Out  Time In: 0915  Time Out: [place], PT    Future Appointments   Date Time Provider Yoon Suarez   6/9/2020  9:15 AM Jennifer Coronado, PT [facility]   6/12/2020  9:45 AM Jennifer Coronado, PT [facility]   6/16/2020  9:30 AM Jennifer Coronado, PT [facility]   11/18/2020  9:00 AM Ping Whittington MD [facility]   5/21/2021  9:15 AM [facility]

## 2020-06-12 ENCOUNTER — HOSPITAL ENCOUNTER (OUTPATIENT)
Dept: PHYSICAL THERAPY | Age: 75
Discharge: HOME | End: 2020-06-12
Payer: MEDICARE

## 2020-06-12 PROCEDURE — 97110 THERAPEUTIC EXERCISES: CPT

## 2020-06-12 PROCEDURE — 97530 THERAPEUTIC ACTIVITIES: CPT

## 2020-06-12 NOTE — THERAPY DISCHARGE
Zonia Ip  : 1945    [place] / Product Type: Medicare /    [address] [facility] at [address] [place] [address]. [facility], [address], [place], [address] [place] [address]  [phone]   [phone]        OUTPATIENT PHYSICAL THERAPY: Re-certification Assessment 2020    ICD-10: Treatment Diagnosis:   Pain in right hip (M25.551)  Stiffness of right hip, not elsewhere classified (M25.651)  Presence of right artificial hip joint (X51.590)          Precautions/Allergies:   Patient has no known allergies. Fall Risk Score: 1 (? 5 = High Risk)  MD Orders: Eval and Treat  MEDICAL/REFERRING DIAGNOSIS:  Presence of right artificial hip joint [Z96.641]   DATE OF ONSET: 2019  REFERRING PHYSICIAN: Demi Leblanc MD  RETURN PHYSICIAN APPOINTMENT: TBD by patient       ·  1. GOALS: (Goals have been discussed and agreed upon with patient.)   Short-Term Goals~4 weeks  Goal Met   1. Zonia Ip will be independent with HEP for strength and ROM 1. [x] Date:   2. Zonia Ip will participate in LE strengthening exercises for hip, knee, ankle with weight as appropriate for 3 sets of 10 2. [x] Date:   3. Zonia Ip will report <=3/10 pain with stair climbing and demonstrate minimal to no difficulty 3. [x] Date:   4. Zonia Ip will demonstrate improvement of MMT from initial eval to 4+/5 B LE in order to return to participate in ADLs with minimal to no difficulty. 4.  [x] Date: 2020     5. Zonia Ip will participate in static and dynamic balance activities for 5 minutes to help improve proprioception and decrease risk of falls. 5.  [x] Date:2020     6. Zonia Ip to increase lower extremity functional scale by 10 points to show improvement in areas of difficulty 6. [x] Date:         Long Term Goals~8 weeks Goal Met   1. Zonia Ip will be independent in HEP of stretching and strengthening 1.   [x] Date:6/12/2020     2. Angel Harrison will be able to perform sit to stand transfers independently with decreased compensation  2. [x] Date:6/12/2020     3. Angel Harrison will ascend/descend 20 steps with reciprocal gait pattern and rail 3. [x] Date: 6/12/2020   4. Angel Harrison to increase lower extremity functional scale by 20 points to show improvement in areas of difficulty 4. [x] Date:6/12/2020            CLINICAL DECISION MAKING:   Outcome Measure: Tool Used: Lower Extremity Functional Scale (LEFS)  Score:  Initial: 41/80 Most Recent: (51/80) (Date:5/11/2020 ) Discharge   (42/80)   Interpretation of Score: 20 questions each scored on a 5 point scale with 0 representing \"extreme difficulty or unable to perform\" and 4 representing \"no difficulty\". The lower the score, the greater the functional disability. 80/80 represents no disability. Minimal detectable change is 9 points. ·      Total Treatment Duration:       Flavia Harden PT     Referring Physician Signature: Kurtis Martino MD              Date                    HISTORY:          EXAMINATION:   Observation/Orthostatic Postural Assessment:    · Gait= Moderate trendelenburg on left which indicates right glut med weakness. Left LE circumduction during swing phase.   Palpation:  Assessed @ Initial Visit: Tenderness to right glut med  ROM: Assessed @ Initial Visit:  AROM/PROM         Joint: Eval Date: 3/11/2020  Re-Assess Date: 5/11/2020  Re-Assess Date: 6/12/2020     Active ROM RIGHT LEFT RIGHT LEFT RIGHT LEFT   Knee Extension 0 0 0 0 0 0   Knee Flexion         Hip Flexion 95 116 112 118 115 116   Hip Abduction 40 41 42 45     Prone Hip Extension w/ Knee bent          Hip IR/ER           Strength:     Eval Date: 3/11/2020  Re-Assess Date:  Re-Assess Date:      RIGHT LEFT RIGHT LEFT RIGHT LEFT   Knee Flexion 4+/5 4+/5 5/5 5/5 5/5 5/5   Knee Extension 4+/5 5/5 5/5 5/5 5/5 5/5   Hip Flexion 4/5 4+/5 4+/5 4+/5 5/5 5/5   Hip Abduction 3-/5 4+/5 4/5 4/5 4/5 4+/5   Hip Extension 4-/5 4+/5 4/5  4+/5 4+/5                Special Tests: Assessed @ Initial Visit   · none indicated     Neurological Screen: Patient demonstrates/reports no loss in sensation  Functional Mobility:  Affecting participation in ambulation and standing   · Squat:  · Steps:  Balance:    Single Leg Stance: R= <15 seconds/ L= <15 seconds  TU.44  Sit to stand: 16    1.  1.                         See treatment note for associated treatment provided today.     Future Appointments   Date Time Provider Yoon Suarez   2020  9:00 AM Bev Lynch MD [facility]   2021  9:15 AM Bev Lynch MD [facility]         Alexei Duvall PT

## 2020-06-12 NOTE — PROGRESS NOTES
Kaleb Gary  : 1945  [place] / Product Type: Medicare /  [address] [facility] at [address] [place] [address]. [facility], [address] [place] [address]  [phone]   [phone]                                                          Elvia Villavicencio MD      OUTPATIENT PHYSICAL THERAPY: Daily Treatment Note 2020 Visit Count:  14    ICD 10:  Pain in right hip (M25.551)  Stiffness of right hip, not elsewhere classified (M25.651)  Presence of right artificial hip joint (G12.673)        Pre-treatment Symptoms/Complaints:  Please See Progress Report Dated 2020 for more details. Pain: Initial:1/10 Post Session: 0/10   Medications Last Reviewed:  2020     Updated Objective Findings: Please See Progress Report dated 2020 for more details. TREATMENT:   THERAPEUTIC EXERCISE: (8 minutes):  Exercises per grid below to improve mobility, strength and balance. Required minimal visual, verbal and manual cues to promote proper body alignment and promote proper body posture. Progressed resistance and complexity of movement as indicated. Date  2020 Date  2020 Date  2020 Date  2020 Date  2020   Activity/Exercise        Education        Hip Abduction 30xs blue band  30xs     Hip extension 30xs blue band  30xs     bridges        Hip hikes 30xs   30xs 2 inch step    Clams        Glut min clams        Single stance        calf stretch        Hamstring stretch        Gluteal stretch        nustep 8  min 8 min 8 min 9 min 8 min   Toe raise        bridge        Calf raise and post tib activation 30xs 30 30 1 inch step 30xs inch step slow 4-2-4    Calf raise  30xs 30 30 1 inch step           THERAPEUTIC ACTIVITY: ( 50 minutes):  Activities per gid below to improve functional movement related mobility, strength and balance to improve neuro-muscular carryover to daily functional activities for improving patient's quality of life. Required visual, verbal and manual cues to promote proper body alignment and promote proper body posture/mechanics. Progressed resistance and complexity of movement as indicated. Date  5/29/2020 Date  6/2/2020 Date  6/5/2020 Date  6/9/2020 Date  6/12/2020   Activity/Exercise        Lateral walking 4xs blue band 4xs blue band 4xs blue band 4xs blue band 4xs blue band   Monster walks 4xs blue band 4xs blue band 4xs blue band 4xs blue band 4xs blue band   Shuttle 75 lb 3x 10  Sl 50 3x 10  S/L  2x10 25 lbs 125 lb 3x 8  Sl 75 3x 8  S/L  2x10 50 lbs 125 lb 3x 8  Sl 100   3x 8  S/L  2x10 50 lbs 125 lb 3x 8  Sl 100   3x 8  S/L  2x10 50 lbs 125 lb 3x 8  Sl 100   3x 8  S/L  2x10 50 lb   CCTKE        Sit to stand 30xs blue band in normal chair 30xs blue band in normal chair 30xs blue band in normal chair   15llbs goblet quats 3x15 15 lbs Goblet squats 3x15 15 lbs Goblet squats   Side step with squats  15xs 25xs     Balanced baord 3 min   3 min    Toe walk 3xs 20 ft 3xs 20 ft 3xs 20 ft 4xs 20 ft 4xs 20 ft   Heel walk 3xs 20 ft 3xs 20 ft 3xs 20 ft 4xs 20 ft 4xs 20 ft   Stool scoots 15ft 4 xs 15ft 4 xs 15ft 4 xs  15ft 4 xs   Three way taps   10 xs each side  10 xs each side       MANUAL THERAPY: (0 minutes): Joint mobilization, Soft tissue mobilization was utilized and necessary because of the patient's restricted joint motion and restricted motion of soft tissue mobility. Date  6/12/2020    Technique Used Grade Level # Time(s) Effect while being performed                                                                 HEP Log Date 1.   2 way hip Abd/ext 3/11/2020   2. Hip hikes 3/11/2020   3.bridges 3/11/2020   4. clams    5. [employer] Portal  Treatment/Session Summary:    Response to Treatment: Please See Progress Note dated 6/12/2020 for more details.    Communication/Consultation:  Faxed Progress Report to MD.   Equipment provided today: HEP Handout     Recommendations/Intent for next treatment session:   Next visit will focus on Manual Therapy Core Stability Quad strengthening Hip strengthening soft tissue mobilization. Treatment Plan of Care Effective Dates: 5/11/2020 TO 7/10/2020 (60 days).   Frequency/Duration: 2 times a week for 60 Days             Total Treatment Billable Duration:   59  Rx   PT Patient Time In/Time Out  Time In: 3396  Time Out: [address] Giannie N Ruiz Montoya, DYLAN    Future Appointments   Date Time Provider Yoon Suarez   11/18/2020  9:00 AM Hermelinda Vegas MD [facility]   5/21/2021  9:15 AM Scotty Lynch MD [facility]

## 2020-06-16 ENCOUNTER — APPOINTMENT (OUTPATIENT)
Dept: PHYSICAL THERAPY | Age: 75
End: 2020-06-16
Payer: MEDICARE

## 2020-09-23 ENCOUNTER — HOSPITAL ENCOUNTER (OUTPATIENT)
Dept: CT IMAGING | Age: 75
Discharge: HOME | End: 2020-09-23
Attending: NURSE PRACTITIONER
Payer: MEDICARE

## 2020-09-23 DIAGNOSIS — R10.9 RIGHT FLANK PAIN: ICD-10-CM

## 2020-09-23 DIAGNOSIS — R10.11 RIGHT UPPER QUADRANT PAIN: ICD-10-CM

## 2020-09-23 DIAGNOSIS — Z87.448 HISTORY OF HEMATURIA: ICD-10-CM

## 2020-09-23 PROCEDURE — 74176 CT ABD & PELVIS W/O CONTRAST: CPT

## 2020-09-23 NOTE — PROGRESS NOTES
Spoke with patient and let her know per [facility] that a renal cyst was noted and that Azam Kiser was sending a referral to Urology. Patient voiced understanding and referral placed.

## 2020-09-30 ENCOUNTER — HOSPITAL ENCOUNTER (OUTPATIENT)
Dept: PHYSICAL THERAPY | Age: 75
Discharge: HOME | End: 2020-09-30
Payer: MEDICARE

## 2020-09-30 PROCEDURE — 97110 THERAPEUTIC EXERCISES: CPT

## 2020-09-30 PROCEDURE — 97161 PT EVAL LOW COMPLEX 20 MIN: CPT

## 2020-09-30 NOTE — PROGRESS NOTES
Abida Saunders  : 1945  [place] / Product Type: Medicare /  Eleno Billingsley at [address] [place] [address] [address] [place] [address], [address]  [phone]   [phone]                                                          Armando Callahan MD      OUTPATIENT PHYSICAL THERAPY: Daily Treatment Note 2020 Visit Count:  1      ICD-10: Treatment Diagnosis:   Low back pain (M54.5)  Muscle Weakness, Generalized (M62.81)  Abnormal posture (R29.3)  Effusion, right hip (M25.451)                 Precautions/Allergies:   Patient has no known allergies. Fall Risk Score: 0 (? 5 = High Risk)  MD Orders: Eval and Treat  MEDICAL/REFERRING DIAGNOSIS:  low back pain  presence of right artificial hip joint   DATE OF ONSET: 1 year  REFERRING PHYSICIAN: Armando Callahan MD  RETURN PHYSICIAN APPOINTMENT:            Pre-treatment Symptoms/Complaints:  See Initial Eval Dated 20 for more details. Pain: Initial:0/10 Post Session: 0/10   Medications Last Reviewed:  2020     Updated Objective Findings: See Initial Eval for more details. TREATMENT:   THERAPEUTIC EXERCISE: (25 minutes):  Exercises per grid below to improve mobility, strength and balance. Required minimal visual, verbal and manual cues to promote proper body alignment and promote proper body posture. Progressed resistance and complexity of movement as indicated. Date:  2020 Date:   Date:     Activity/Exercise Parameters Parameters Parameters   Education HEP, POC, PT goals, anatomy/pathology education on walking programs and improving gait speed. Prognosis, expectations and PT rationale. Hip abduction 30xs     Hip extension 20xs     Walking programs 3 min                             THERAPEUTIC ACTIVITY: ( 0 minutes):  Activities per gid below to improve functional movement related mobility, strength and balance to improve neuro-muscular carryover to daily functional activities for improving patient's quality of life. Required visual, verbal and manual cues to promote proper body alignment and promote proper body posture/mechanics. Progressed resistance and complexity of movement as indicated. Date:  9/30/2020 Date:   Date:     Activity/Exercise Parameters Parameters Parameters                                                                               MANUAL THERAPY: (0 minutes): Joint mobilization, Soft tissue mobilization was utilized and necessary because of the patient's restricted joint motion and restricted motion of soft tissue mobility. Date  9/30/2020    Technique Used Grade  Level # Time(s) Effect while being performed                                                                 HEP Log Date 1.    9/30/2020   2.  9/30/2020   3. 9/30/2020   4.    5.           [employer] Portal  Treatment/Session Summary:    Response to Treatment: Pt demonstrated understanding of POC and initial HEP. No increase in pain or adverse reactions. Communication/Consultation:  POC, HEP, PT goals, Faxed initial evaluation to MD.   Equipment provided today: HEP Handout     Recommendations/Intent for next treatment session:   Next visit will focus on Manual Therapy Core Stability Quad strengthening Hip strengthening soft tissue mobilization. Treatment Plan of Care Effective Dates: 9/30/2020 TO 11/30/2020 (60 days).   Frequency/Duration: 2 times a week for 60 Days             Total Treatment Billable Duration:   25  Rx plus Pooja Cash PT   TIME IN: [facility] 19. [id number]    Future Appointments   Date Time Provider Yoon Suarez   9/30/2020 10:00 AM Indu Reed, DYLAN [facility]   10/2/2020 11:00 AM Indu Reed, PT [facility]   10/7/2020 10:15 AM Indu Reed, PT [facility]   10/9/2020 10:00 AM Indu Reed, PT [facility]   10/14/2020 10:15 AM Indu Reed, PT [facility]   10/16/2020 10:00 AM Bradley Imus, PT [facility]   10/21/2020 10:15 AM Juan F Imus, PT [facility]   10/23/2020 10:00 AM Bradley Imus, PT [facility]   10/28/2020 10:15 AM Bradley Imus, PT [facility]   10/30/2020 10:00 AM Bradley Imus, PT [facility]   10/30/2020  2:00 PM Chica Sotelo,  [facility]   11/18/2020  9:00 AM Dori Mccoy MD [facility]   5/21/2021  9:15 AM Natali Lynch MD [facility]

## 2020-09-30 NOTE — THERAPY EVALUATION
Jessica Guzman  : 1945      [place] / Product Type: Medicare /  [address] [facility] at [address] [place] [address]. [address] [place] [address] [address], [facility], [address] [place] [address]  [phone]   [phone]       OUTPATIENT PHYSICAL THERAPY:Initial Assessment 2020      ICD-10: Treatment Diagnosis:   Low back pain (M54.5)  Muscle Weakness, Generalized (M62.81)  Abnormal posture (R29.3)  Effusion, right hip (M25.451)                 Precautions/Allergies:   Patient has no known allergies. Fall Risk Score: 0 (? 5 = High Risk)  MD Orders: Eval and Treat  MEDICAL/REFERRING DIAGNOSIS:  low back pain  presence of right artificial hip joint   DATE OF ONSET: 1 year  REFERRING PHYSICIAN: Harish Lomeli MD  RETURN PHYSICIAN APPOINTMENT:      INITIAL ASSESSMENT:  Ms. Jessica Guzman presents to physical therapy with decreased postural and hip/core strength, ROM, joint mobility, flexibility, functional mobility, and increased pain. No pelvic malalignment upon initial evaluation but decreased posture. These S/S are consistent with Low back pain. Jessica Guzman will benefit from skilled physical therapy (medically necessary) to address above deficits affecting participation in basic ADLs and functional mobility/tolerance. Patient will benefit from manual therapeutic techniques (stretching, joint mobilizations, soft tissue mobilization/myofascial release), therapeutic exercises and activities, postural strengthening/education, and comprehensive home exercises program to address current impairments and functional limitations. PROBLEM LIST (Impacting functional limitations):  1. Decreased Strength  2. Decreased ADL/Functional Activities  3. Decreased Transfer Abilities  4. Decreased Ambulation Ability/Technique  5. Decreased Balance  6. Increased Pain  7. Decreased Activity Tolerance  8. Increased Fatigue  9. Increased Shortness of Breath  10.  Decreased Flexibility/Joint Mobility  11. Decreased [place] with Home Exercise Program INTERVENTIONS PLANNED:  1. Balance Exercise  2. Bed Mobility  3. Cold  4. Cryotherapy  5. Electrical Stimulation  6. Family Education  7. Gait Training  8. Heat  9. Home Exercise Program (HEP)  10. Manual Therapy  11. Neuromuscular Re-education/Strengthening  12. Range of Motion (ROM)  13. Therapeutic Activites  14. Therapeutic Exercise/Strengthening  15. Transfer Training  16. Lumbar Traction   TREATMENT PLAN:  Effective Dates: 9/30/2020 TO 11/30/2020 (60 days). Frequency/Duration: 2 times a week for 60 Days  GOALS: (Goals have been discussed and agreed upon with patient.)     Short-Term Goals~4 weeks  Goal Met   1. Adebayo People will be independent with HEP 1.  [] Date:   2. Adebayo People will participate in LE stretching program to increase flexibility    2. [] Date:   3. Adebayo People will participate in core stabilization exercises to help with stabilization during ADLs 3. [] Date:   4. Adebayo People will participate in LE strengthening program with weights/resistance as appropriate to help with gait and elevations 4. [] Date:   5. Adebayo People will participate in static and dynamic balance activities to decrease the risk for falls     5. [] Date:   6. Adebayo People will tolerate manual therapy/joint mobilizations/soft tissue to increase ROM and decrease pain  6. [] Date:              Long Term Goals~8 weeks Goal Met   1. Adebayo People will demonstrate a 10 point improvement on the Oswestry to show improvement in function 1. [] Date:   2. Adebayo People will report 0/10 pain at rest and during ADLs  2. [] Date:   3. Adebayo People will demonstrate 5/5 LE strength on manual muscle testing 3. [] Date:   4. Adebayo People will be able to perform SLS >5 seconds bilaterally to help with gait and improve balance 4. [] Date:                 Outcome Measure:    Tool Used: Modified Oswestry Low Back Pain Questionnaire    Score: Initial: 46/50  Most Recent: X/50 (Date: -- )   Interpretation of Score: Each section is scored on a 0-5 scale, 5 representing the greatest disability. The scores of each section are added together for a total score of 50. Medical Necessity:   · Skilled intervention continues to be required due to above deficits affecting participation in basic ADLs and overall functional tolerance. Reason for Services/Other Comments:  · Patient continues to require skilled intervention due to  above deficits affecting participation in basic ADLs and overall functional tolerance. Total Treatment Duration:    TIME IN: 1005  TIME OUT:1045    Rehabilitation Potential For Stated Goals: GOOD  Regarding Erik Renteria's therapy, I certify that the treatment plan above will be carried out by a therapist or under their direction. Thank you for this referral,  Marilyn Caraballo PT     Referring Physician Signature: Joyce Roque MD              Date                    HISTORY:   History of Present Injury/Illness (Reason for Referral):  Pt had surgery on her hip one year ago. Pt had minor complications of pain after surgery that kept her walking with a limp. Pt had 1 bout of physical therapy to improve strengthening and discharged to [facility].  Pt went back to her physician at the one year moiz and wanted her to restart therapy to further improve limp.    -Present symptoms/complaints (on day of evaluation)  Pain Scale:  · Current: 0/10  · Best: 0/10  · Worst: 2/10      · Aggravating factors: Walking and Lifting> 20 lbs   · Relieving factors: Rest  · Irritability: Low (Onset of pain is is longer than the time it takes for Pain to go away)      Past Medical History/Comorbidities:   Ms. Genaro He  has a past medical history of Agatston coronary artery calcium score less than 100 (5/2014), Allergic rhinitis (1/22/2014), Arthritis, Colon polyps (1/2015), H/O cardiovascular stress test (5/2014), H/O mammogram (7/2013), Hematuria, HTN (hypertension) (1/22/2014), Hypercholesterolemia, Hypothyroidism (1/22/2014), Otitis externa, Otitis media, Pap smear for cervical cancer screening (7/2013), Thyroid disease, and URI (upper respiratory infection). She also has no past medical history of Adverse effect of anesthesia, Aneurysm ([address] Arrhythmia, Asthma, Autoimmune disease ([address] CAD (coronary artery disease), Cancer ([address] Chronic kidney disease, Chronic obstructive pulmonary disease ([address] Chronic pain, Coagulation disorder ([address] Diabetes ([address] Difficult intubation, Endocarditis, GERD (gastroesophageal reflux disease), Heart failure ([address] Ill-defined condition, Liver disease, Malignant hyperthermia due to anesthesia, Morbid obesity ([address] Nausea & vomiting, Nicotine vapor product user, Non-nicotine vapor product user, Pseudocholinesterase deficiency, Psychiatric disorder, PUD (peptic ulcer disease), Rheumatic fever, Seizures ([address] Sleep apnea, Stroke ([address] or Thromboembolus ([address] Ms. Lisa Chowdhury  has a past surgical history that includes hx colonoscopy (1/2015).   Social History/Living Environment:        Social History     Socioeconomic History    Marital status:      Spouse name: Not on file    Number of children: Not on file    Years of education: Not on file    Highest education level: Not on file   Occupational History    Not on file   Social Needs    Financial resource strain: Not on file    Food insecurity     Worry: Not on file     Inability: Not on file   [employer] needs     Medical: Not on file     Non-medical: Not on file   Tobacco Use    Smoking status: Never Smoker    Smokeless tobacco: Never Used   Substance and Sexual Activity    Alcohol use: No    Drug use: No    Sexual activity: Not on file   Lifestyle    Physical activity     Days per week: Not on file     Minutes per session: Not on file    Stress: Not on file   Relationships    Social connections     Talks on phone: Not on file     Gets together: Not on file     Attends Orthodoxy service: Not on file     Active member of club or organization: Not on file     Attends meetings of clubs or organizations: Not on file     Relationship status: Not on file    Intimate partner violence     Fear of current or ex partner: Not on file     Emotionally abused: Not on file     Physically abused: Not on file     Forced sexual activity: Not on file   Other Topics Concern    Not on file   Social History Narrative    Not on file     Prior Level of Function/Work/Activity:  Normal  Previous Treatment Approach  Previous Physical Therapy   Dominant Side: Right  Other Clinical Tests  X-RAY Positive for Arthritis in back    Active Ambulatory Problems     Diagnosis Date Noted    Allergic rhinitis 01/22/2014    HTN (hypertension) 01/22/2014    Hypothyroidism 01/22/2014    Hypercholesterolemia     Diverticulosis of large intestine without hemorrhage 01/15/2016    FH: colon cancer 05/11/2018    Tubular adenoma of colon 05/11/2018    Cystocele and rectocele with incomplete uterovaginal prolapse 05/14/2019    Primary osteoarthritis involving multiple joints 05/14/2019    Hematuria 05/14/2019    Arthritis of right hip 09/05/2019    H/O total hip arthroplasty, right 09/06/2019     Resolved Ambulatory Problems     Diagnosis Date Noted    Colon polyps 01/01/2015     Past Medical History:   Diagnosis Date    Agatston coronary artery calcium score less than 100 5/2014    Arthritis     H/O cardiovascular stress test 5/2014    H/O mammogram 7/2013    Otitis externa     Otitis media     Pap smear for cervical cancer screening 7/2013    Thyroid disease     URI (upper respiratory infection)      Note: Patient denies any increase of symptoms with cough, sneeze or valsalva. Patient denies any saddle paresthesia or bowel/bladder deficits. Current Medications:    Current Outpatient Medications:     multivitamin (ONE A DAY) tablet, Take 1 Tab by mouth daily. , Disp: , Rfl:    BABY ASPIRIN PO, Take  by mouth., Disp: , Rfl:     benazepril (LOTENSIN) 20 mg tablet, Take 1 Tab by mouth daily. Indications: high blood pressure, Disp: 90 Tab, Rfl: 3    amLODIPine (NORVASC) 5 mg tablet, Take 1 Tab by mouth daily. Indications: high blood pressure, Disp: 90 Tab, Rfl: 3    levothyroxine (SYNTHROID) 25 mcg tablet, Take 1 Tab by mouth Daily (before breakfast). Indications: a condition with low thyroid hormone levels, Disp: 90 Tab, Rfl: 3    pravastatin (PRAVACHOL) 10 mg tablet, Take 1 Tab by mouth nightly., Disp: 90 Tab, Rfl: 3      Ambulatory/Rehab Services H2 Model Falls Risk Assessment    Risk Factors:       No Risk Factors Identified Ability to Rise from Chair:       (0)  Ability to rise in a single movement    Falls Prevention Plan:       No modifications necessary   Total: (5 or greater = High Risk): 0    ©2010 [facility]. All Rights Reserved. [facility] [employer] Patent #[id number]. Federal Law prohibits the replication, distribution or use without written permission from [facility] of [address] [place] [address]       Date Last Reviewed:  9/30/2020   Number of Personal Factors/Comorbidities that affect the Plan of Care: 0: LOW COMPLEXITY   EXAMINATION:   Observation/Orthostatic Postural Assessment: Forward Head and Thoracic Kyphosis  Gait:   Mild lateral trunk lean to left with swing phase of right.  Presence of trendelenburg    ROM:            AROM/PROM         Joint: Eval Date: 9/30/2020  Re-Assess Date:  Re-Assess Date:    Active ROM RIGHT LEFT RIGHT LEFT RIGHT LEFT   Knee Extension         Knee Flexion         Hip Flexion         Hip Abduction                  Lumbar Flexion         Lumbar Extension         Lumbar Side-bending         Lumbar Rotation           Repeated Motion:  Direction    Frequency Symptoms Prior Symptons Post   Flexion Repeated 10 times  Unremarkable   Extension Repeated 10 times  Unremarkable         Strength:     Eval Date: 9/30/2020  Re-Assess Date:  Re-Assess Date: RIGHT LEFT RIGHT LEFT RIGHT LEFT   Knee Flexion (L5-S2)   4+/5  4+/5           Knee Extension (L3, L4)  4+/5  4+/5           Hip Flexion (L1, L2)  4+/5  4+/5           Hip Extension  4+/5  4+/5           Hip Abduction (L5, S1)  4-/5  4-/5           Ankle Dorsiflexion (L4)  5/5  5/5           Great Toe Extension (L5)  5/5  5/5           Ankle Plantar Flexion (S1-S2)  5/5  5/5               Special Tests:  SLR: Negative  SLUMP: Negative      Manual:   Eval Date: 9/30/2020   Reasess Date:      Joint/Area Directon Grade Treatment Effect Joint Direction Grade Treatment Effect    PA I, II and III Unremarkable                                         Neurological Screen:    RADIATING SYMPTOMS: No    Reflex Testing:    Location Left Right   Patellar (L4) normal normal   Achilles (S1) normal normal         Upper motor [place] screen         Clonus: Negative         Babinski: Negative    Functional Mobility:  Affecting participation in basic ADLs and functional tasks. Balance:     Single Leg Stance: R= <15 seconds L= <15 seconds  TUG=7.45  30 second STS= 12   Body Structures Involved:  1. Bones  2. Joints  3. Muscles  4. Ligaments Body Functions Affected:  1. Sensory/Pain  2. Neuromusculoskeletal  3. Movement Related Activities and Participation Affected:  1. Mobility  2.  Self Care   Number of elements that affect the Plan of Care: 1-2: LOW COMPLEXITY   CLINICAL PRESENTATION:   Presentation: Stable and uncomplicated: LOW COMPLEXITY   CLINICAL DECISION MAKING:      Use of outcome tool(s) and clinical judgement create a POC that gives a: Clear prediction of patient's progress: LOW COMPLEXITY     See associated treatment note for treatment provided today      Future Appointments   Date Time Provider Yoon Suarez   9/30/2020 10:00 AM Reyes Garza PT [facility]   10/2/2020 11:00 AM [facility]   10/7/2020 10:15 AM Reyes Garza PT [facility]   10/9/2020 10:00 AM Parviz Church, PT [facility]   10/14/2020 10:15 AM Parviz Church, PT [facility]   10/30/2020  2:00 PM Nicole Sotelo,  [facility]   11/18/2020  9:00 AM Susanne Ferrera MD [facility]   5/21/2021  9:15 AM [facility]         Claude Nutting, DYLAN

## 2020-10-02 ENCOUNTER — HOSPITAL ENCOUNTER (OUTPATIENT)
Dept: PHYSICAL THERAPY | Age: 75
Discharge: HOME | End: 2020-10-02
Payer: MEDICARE

## 2020-10-02 PROCEDURE — 97110 THERAPEUTIC EXERCISES: CPT

## 2020-10-02 NOTE — PROGRESS NOTES
Flor Ramsey  : 1945  [place] / Product Type: Medicare /  [address] at [address]. [facility] [address] [place] [address], [facility], [address] [place] [address]  [phone]   [phone]                                                          Joyce Roque MD      OUTPATIENT PHYSICAL THERAPY: Daily Treatment Note 10/2/2020 Visit Count:  2      ICD-10: Treatment Diagnosis:   Low back pain (M54.5)  Muscle Weakness, Generalized (M62.81)  Abnormal posture (R29.3)  Effusion, right hip (M25.451)                 Precautions/Allergies:   Patient has no known allergies. Fall Risk Score: 0 (? 5 = High Risk)  MD Orders: Eval and Treat  MEDICAL/REFERRING DIAGNOSIS:  low back pain  presence of right artificial hip joint   DATE OF ONSET: 1 year  REFERRING PHYSICIAN: Joyce Roque MD  RETURN PHYSICIAN APPOINTMENT:            Pre-treatment Symptoms/Complaints:  Pt reports that she was able to do her strengthenign at her home. Pain: Initial:0/10 Post Session: 0/10   Medications Last Reviewed:  10/2/2020     Updated Objective Findings: See Initial Eval for more details. TREATMENT:   THERAPEUTIC EXERCISE: (40 minutes):  Exercises per grid below to improve mobility, strength and balance. Required minimal visual, verbal and manual cues to promote proper body alignment and promote proper body posture. Progressed resistance and complexity of movement as indicated. Date:  2020 Date:  10/2/2020 Date:     Activity/Exercise Parameters Parameters Parameters   Education HEP, POC, PT goals, anatomy/pathology education on walking programs and improving gait speed. Prognosis, expectations and PT rationale.        Hip abduction 30xs 30xs red band    Hip extension 20xs 30xs red band    Walking programs 3 min     Lateral walking   Red band 20 ft 5xs    Monster walk  Red band 20 ft 5xs    Sit to stand  30xs red band around knees    Shuttle  DL 62lbs 4x10  SL 3x10 37    SL stance  Marching 10xs each side    Calf raise  30xs     Deadlifting  5 min edu on technique          THERAPEUTIC ACTIVITY: ( 0 minutes): Activities per gid below to improve functional movement related mobility, strength and balance to improve neuro-muscular carryover to daily functional activities for improving patient's quality of life. Required visual, verbal and manual cues to promote proper body alignment and promote proper body posture/mechanics. Progressed resistance and complexity of movement as indicated. Date:  9/30/2020 Date:   Date:     Activity/Exercise Parameters Parameters Parameters                                                                               MANUAL THERAPY: (0 minutes): Joint mobilization, Soft tissue mobilization was utilized and necessary because of the patient's restricted joint motion and restricted motion of soft tissue mobility. Date  10/2/2020    Technique Used Grade  Level # Time(s) Effect while being performed                                                                 HEP Log Date 1.    10/2/2020   2.  10/2/2020   3. 10/2/2020   4.    5.           [employer] Portal  Treatment/Session Summary:    Response to Treatment: Pt progressed with LE strengthneing focusing on hip abduction strength. GOod tolerance to activity with short rest breaks. Communication/Consultation:  POC, HEP, PT goals, Faxed initial evaluation to MD.   Equipment provided today: HEP Handout     Recommendations/Intent for next treatment session:   Next visit will focus on Manual Therapy Core Stability Quad strengthening Hip strengthening soft tissue mobilization. Treatment Plan of Care Effective Dates: 9/30/2020 TO 11/30/2020 (60 days).   Frequency/Duration: 2 times a week for 60 Days             Total Treatment Billable Duration:   40  Rx   PT Patient Time In/Time Out  Time In: 1105  Time Out: Gee Tellez [address] José Peters, PT       Future Appointments   Date Time Provider Yoon Bañuelosi   10/7/2020 10:15 AM Abby Rosen, PT [facility]   10/9/2020 10:00 AM Abby Rosen, PT [facility]   10/14/2020 10:15 AM Abby Rosen, PT [facility]   10/16/2020 10:00 AM Abby Rosen, PT [facility]   10/21/2020 10:15 AM Abby Rosen, PT [facility]   10/23/2020 10:00 AM Abby Rosen, PT [facility]   10/28/2020 10:15 AM Abby Rosen, PT [facility]   10/30/2020 10:00 AM Abby Rosen, PT [facility]   10/30/2020  2:00 PM Alanis Sotelo, DO [facility]   11/18/2020  9:00 AM Jennifer Rodriguez MD [facility] HT[facility]   5/21/2021  9:15 AM Sagar Lynch MD [facility] HTF

## 2020-10-07 ENCOUNTER — HOSPITAL ENCOUNTER (OUTPATIENT)
Dept: PHYSICAL THERAPY | Age: 75
Discharge: HOME | End: 2020-10-07
Payer: MEDICARE

## 2020-10-07 NOTE — PROGRESS NOTES
Budd Postin  : 1945  [place] / [place] / Product Type: Medicare /  Tiffanie Foster at [address] [place] [address]. [address] [place] [address] [address] [place] [address], [address]  [phone]   [phone]        OUTPATIENT DAILY NOTE    NAME: Budd Postin    DATE: 10/7/2020    Patient Cancelled physical therapy appointment today due to Sickness. Will follow up next appointment.     Clayton Pérez, PT    Future Appointments   Date Time Provider Yoon Suarez   10/7/2020  7:00 PM Brendolyn Petties, PT [facility] [place] [facility]   10/9/2020 10:00 AM Brendolyn Petties, PT [facility]   10/14/2020 10:15 AM Brendolyn Petties, PT [facility]   10/16/2020 10:00 AM Brendolyn Petties, PT [facility]   10/21/2020 10:15 AM Brendolyn Petties, PT [facility]   10/23/2020 10:00 AM Brendolyn Petties, PT [facility]   10/28/2020 10:15 AM Brendolyn Petties, PT [facility]   10/30/2020 10:00 AM Brendolyn Petties, PT [facility]   10/30/2020  2:00 PM Beverly Sotelo DO [facility]   2020  9:00 AM John Barrow MD [facility] HTF   2021  9:15 AM Dionicio Lynch MD [facility] HTF

## 2020-10-09 ENCOUNTER — APPOINTMENT (OUTPATIENT)
Dept: PHYSICAL THERAPY | Age: 75
End: 2020-10-09
Payer: MEDICARE

## 2020-10-14 ENCOUNTER — HOSPITAL ENCOUNTER (OUTPATIENT)
Dept: PHYSICAL THERAPY | Age: 75
Discharge: HOME | End: 2020-10-14
Payer: MEDICARE

## 2020-10-14 PROCEDURE — 97110 THERAPEUTIC EXERCISES: CPT

## 2020-10-14 NOTE — PROGRESS NOTES
Leila Aw  : 1945  [place] / Product Type: Medicare /  Doni Lal at [facility]. [address], [facility], [address] [place] [address]  [phone]   [phone]                                                          True Chavira MD      OUTPATIENT PHYSICAL THERAPY: Daily Treatment Note 10/14/2020 Visit Count:  3      ICD-10: Treatment Diagnosis:   Low back pain (M54.5)  Muscle Weakness, Generalized (M62.81)  Abnormal posture (R29.3)  Effusion, right hip (M25.451)                 Precautions/Allergies:   Patient has no known allergies. Fall Risk Score: 0 (? 5 = High Risk)  MD Orders: Eval and Treat  MEDICAL/REFERRING DIAGNOSIS:  low back pain  presence of right artificial hip joint   DATE OF ONSET: 1 year  REFERRING PHYSICIAN: True Chavira MD  RETURN PHYSICIAN APPOINTMENT:            Pre-treatment Symptoms/Complaints:  Pt reports doing well no complaints  Pain: Initial:0/10 Post Session: 0/10   Medications Last Reviewed:  10/14/2020     Updated Objective Findings: See Initial Eval for more details. TREATMENT:   THERAPEUTIC EXERCISE: (41 minutes):  Exercises per grid below to improve mobility, strength and balance. Required minimal visual, verbal and manual cues to promote proper body alignment and promote proper body posture. Progressed resistance and complexity of movement as indicated. Date:  2020 Date:  10/2/2020 Date:  10/14/2020   Activity/Exercise Parameters Parameters Parameters   Education HEP, POC, PT goals, anatomy/pathology education on walking programs and improving gait speed. Prognosis, expectations and PT rationale.        Hip abduction 30xs 30xs red band 30xs red band around knees   Hip extension 20xs 30xs red band  30xs red band around knees   Walking programs 3 min     Lateral walking   Red band 20 ft 5xs Orange band 15ft 4xs   Monster walk  Red band 20 ft 5xs Orange band 15 ft 4xs   Sit to stand  30xs red band around knees 30xs red band around knees   Shuttle  DL 62lbs 4x10  SL 3x10 37 DL 82 3x10  SL 75 2x10   SL stance  Marching 10xs each side    Calf raise  30xs  40xs 15lbs   Deadlifting  5 min edu on technique          THERAPEUTIC ACTIVITY: ( 0 minutes): Activities per gid below to improve functional movement related mobility, strength and balance to improve neuro-muscular carryover to daily functional activities for improving patient's quality of life. Required visual, verbal and manual cues to promote proper body alignment and promote proper body posture/mechanics. Progressed resistance and complexity of movement as indicated. Date:  9/30/2020 Date:   Date:     Activity/Exercise Parameters Parameters Parameters                                                                               MANUAL THERAPY: (0 minutes): Joint mobilization, Soft tissue mobilization was utilized and necessary because of the patient's restricted joint motion and restricted motion of soft tissue mobility. Date  10/14/2020    Technique Used Grade Level # Time(s) Effect while being performed                                                                 HEP Log Date 1.    10/14/2020   2.  10/14/2020   3. 10/14/2020   4.    5.           [facility] Portal  Treatment/Session Summary:    Response to Treatment: Pt continued with strengthening and progressed as tolerate. No pain with exercises and good contorl of knee vagus with sit to stands. Communication/Consultation:  POC, HEP, PT goals, Faxed initial evaluation to MD.   Equipment provided today: HEP Handout     Recommendations/Intent for next treatment session:   Next visit will focus on Manual Therapy Core Stability Quad strengthening Hip strengthening soft tissue mobilization. Treatment Plan of Care Effective Dates: 9/30/2020 TO 11/30/2020 (60 days).   Frequency/Duration: 2 times a week for 60 Days Total Treatment Billable Duration:   41  Rx   PT Patient Time In/Time Out  Time In: 1015  Time Out: [address], PT       Future Appointments   Date Time Provider Yoon Daniela   10/16/2020 10:00 AM Sindi Merck, PT [facility]   10/21/2020 10:15 AM Sindi Merck, PT [facility]   10/23/2020 10:00 AM Sindi Merck, PT [facility]   10/28/2020 10:15 AM Sindi Merck, PT [facility]   10/30/2020 10:00 AM Sindi Merck, PT [facility]   10/30/2020  2:00 PM Orly Sotelo, DO [facility]   11/18/2020  9:00 AM Alden Nguyen MD S[facility] HTF HTF   5/21/2021  9:15 AM José Lynch MD [facility]F HTF

## 2020-10-16 ENCOUNTER — HOSPITAL ENCOUNTER (OUTPATIENT)
Dept: PHYSICAL THERAPY | Age: 75
Discharge: HOME | End: 2020-10-16
Payer: MEDICARE

## 2020-10-16 PROCEDURE — 97110 THERAPEUTIC EXERCISES: CPT

## 2020-10-16 NOTE — PROGRESS NOTES
Kevin Tenorio  : 1945  [place] / Product Type: Medicare /  [address] [facility] at [address] [place] [address]. [address] [place] [address] [address] [place] [address], [address]  [phone]   [phone]                                                          Soraya Haas MD      OUTPATIENT PHYSICAL THERAPY: Daily Treatment Note 10/16/2020 Visit Count:  4      ICD-10: Treatment Diagnosis:   Low back pain (M54.5)  Muscle Weakness, Generalized (M62.81)  Abnormal posture (R29.3)  Effusion, right hip (M25.451)                 Precautions/Allergies:   Patient has no known allergies. Fall Risk Score: 0 (? 5 = High Risk)  MD Orders: Eval and Treat  MEDICAL/REFERRING DIAGNOSIS:  low back pain  presence of right artificial hip joint   DATE OF ONSET: 1 year  REFERRING PHYSICIAN: Soraya Haas MD  RETURN PHYSICIAN APPOINTMENT:            Pre-treatment Symptoms/Complaints:  Please See Discharge Summary dated 10.16.20 for more details. Pain: Initial:0/10 Post Session: 0/10   Medications Last Reviewed:  10/16/2020     Updated Objective Findings: Please See Discharge Summary date 10.16.20 for more details. TREATMENT:   THERAPEUTIC EXERCISE: (39 minutes):  Exercises per grid below to improve mobility, strength and balance. Required minimal visual, verbal and manual cues to promote proper body alignment and promote proper body posture. Progressed resistance and complexity of movement as indicated. Date:  2020 Date:  10/2/2020 Date:  10/14/2020 Date  10/16/2020   Activity/Exercise Parameters Parameters Parameters    Education HEP, POC, PT goals, anatomy/pathology education on walking programs and improving gait speed. Prognosis, expectations and PT rationale. Education on discharge plan. HEP and equipement to improve strength.      Hip abduction 30xs 30xs red band 30xs red band around knees 30xs   Hip extension 20xs 30xs red band  30xs red band around knees 30xs   Walking programs 3 min      Lateral walking   Red band 20 ft 5xs Orange band 15ft 4xs Orange band 15 ft 4xs   Monster walk  Red band 20 ft 5xs Orange band 15 ft 4xs Orange band 15 ft 4xs   Sit to stand  30xs red band around knees 30xs red band around knees 30xs red band around knees   Shuttle  DL 62lbs 4x10  SL 3x10 37 DL 82 3x10  SL 75 2x10    SL stance  Marching 10xs each side  Marching 10xs each side   Calf raise  30xs  40xs 15lbs    Deadlifting  5 min edu on technique           THERAPEUTIC ACTIVITY: ( 0 minutes): Activities per gid below to improve functional movement related mobility, strength and balance to improve neuro-muscular carryover to daily functional activities for improving patient's quality of life. Required visual, verbal and manual cues to promote proper body alignment and promote proper body posture/mechanics. Progressed resistance and complexity of movement as indicated. Date:  9/30/2020 Date:   Date:     Activity/Exercise Parameters Parameters Parameters                                                                               MANUAL THERAPY: (0 minutes): Joint mobilization, Soft tissue mobilization was utilized and necessary because of the patient's restricted joint motion and restricted motion of soft tissue mobility. Date  10/16/2020    Technique Used Grade Level # Time(s) Effect while being performed                                                                 HEP Log Date 1.    10/16/2020   2.  10/16/2020   3. 10/16/2020   4.    5.           [employer] Portal  Treatment/Session Summary:    Response to Treatment: Please see Discharge Summary dated 10.16.20 for more details.    Communication/Consultation:  Faxed Discharge Summary to MD.   Equipment provided today: HEP Handout     Recommendations/Intent for next treatment session:   Next visit will focus on Manual Therapy Core Stability Quad strengthening Hip strengthening soft tissue mobilization.                Total Treatment Billable Duration:   39  Rx   PT Patient Time In/Time Out  Time In: 1001  Time Out: [address], PT       Future Appointments   Date Time Provider Yoon Bañuelosi   10/21/2020 10:15 AM Abby Rosen, PT [facility]   10/23/2020 10:00 AM Abby Rosen, PT [facility]   10/28/2020 10:15 AM Abby Rosen, PT [facility]   10/30/2020 10:00 AM Abby Rosen, PT [facility]   10/30/2020  2:00 PM Alanis Sotelo,  [facility] PGU   11/18/2020  9:00 AM Jennifer Rodriguez MD SSA HTF HTF   5/21/2021  9:15 AM Sagar Lynch MD [facility]F HTF

## 2020-10-21 ENCOUNTER — APPOINTMENT (OUTPATIENT)
Dept: PHYSICAL THERAPY | Age: 75
End: 2020-10-21
Payer: MEDICARE

## 2020-10-23 ENCOUNTER — APPOINTMENT (OUTPATIENT)
Dept: PHYSICAL THERAPY | Age: 75
End: 2020-10-23
Payer: MEDICARE

## 2020-10-28 ENCOUNTER — APPOINTMENT (OUTPATIENT)
Dept: PHYSICAL THERAPY | Age: 75
End: 2020-10-28
Payer: MEDICARE

## 2020-10-30 ENCOUNTER — APPOINTMENT (OUTPATIENT)
Dept: PHYSICAL THERAPY | Age: 75
End: 2020-10-30
Payer: MEDICARE

## 2020-11-12 ENCOUNTER — HOSPITAL ENCOUNTER (OUTPATIENT)
Dept: GENERAL RADIOLOGY | Age: 75
Discharge: HOME | End: 2020-11-12

## 2020-11-12 DIAGNOSIS — R10.9 RIGHT FLANK PAIN: ICD-10-CM

## 2022-03-18 PROBLEM — R31.9 HEMATURIA: Status: ACTIVE | Noted: 2019-05-14

## 2022-03-19 PROBLEM — M15.9 PRIMARY OSTEOARTHRITIS INVOLVING MULTIPLE JOINTS: Status: ACTIVE | Noted: 2019-05-14

## 2022-03-19 PROBLEM — D12.6 TUBULAR ADENOMA OF COLON: Status: ACTIVE | Noted: 2018-05-11

## 2022-03-19 PROBLEM — N81.2 CYSTOCELE AND RECTOCELE WITH INCOMPLETE UTEROVAGINAL PROLAPSE: Status: ACTIVE | Noted: 2019-05-14

## 2022-03-19 PROBLEM — Z96.641 H/O TOTAL HIP ARTHROPLASTY, RIGHT: Status: ACTIVE | Noted: 2019-09-06

## 2022-03-19 PROBLEM — M15.0 PRIMARY OSTEOARTHRITIS INVOLVING MULTIPLE JOINTS: Status: ACTIVE | Noted: 2019-05-14

## 2022-03-19 PROBLEM — M16.11 ARTHRITIS OF RIGHT HIP: Status: ACTIVE | Noted: 2019-09-05

## 2022-03-20 PROBLEM — Z80.0 FH: COLON CANCER: Status: ACTIVE | Noted: 2018-05-11

## 2022-03-23 ENCOUNTER — APPOINTMENT (OUTPATIENT)
Dept: GENERAL RADIOLOGY | Age: 77
End: 2022-03-23
Attending: EMERGENCY MEDICINE
Payer: MEDICARE

## 2022-03-23 ENCOUNTER — APPOINTMENT (OUTPATIENT)
Dept: CT IMAGING | Age: 77
End: 2022-03-23
Attending: EMERGENCY MEDICINE
Payer: MEDICARE

## 2022-03-23 ENCOUNTER — HOSPITAL ENCOUNTER (EMERGENCY)
Age: 77
Discharge: HOME | End: 2022-03-23
Attending: EMERGENCY MEDICINE
Payer: MEDICARE

## 2022-03-23 VITALS
SYSTOLIC BLOOD PRESSURE: 125 MMHG | HEART RATE: 84 BPM | BODY MASS INDEX: 25.71 KG/M2 | WEIGHT: 160 LBS | TEMPERATURE: 97.8 F | OXYGEN SATURATION: 93 % | DIASTOLIC BLOOD PRESSURE: 78 MMHG | HEIGHT: 66 IN | RESPIRATION RATE: 16 BRPM

## 2022-03-23 DIAGNOSIS — M97.01XA PERIPROSTHETIC FRACTURE AROUND INTERNAL PROSTHETIC RIGHT HIP JOINT, INITIAL ENCOUNTER (HCC): Primary | ICD-10-CM

## 2022-03-23 PROCEDURE — 99284 EMERGENCY DEPT VISIT MOD MDM: CPT

## 2022-03-23 PROCEDURE — 73700 CT LOWER EXTREMITY W/O DYE: CPT

## 2022-03-23 PROCEDURE — 73552 X-RAY EXAM OF FEMUR 2/>: CPT

## 2022-03-23 PROCEDURE — 73502 X-RAY EXAM HIP UNI 2-3 VIEWS: CPT

## 2022-03-23 RX ORDER — HYDROCODONE BITARTRATE AND ACETAMINOPHEN 5; 325 MG/1; MG/1
1 TABLET ORAL
Qty: 20 TABLET | Refills: 0 | Status: SHIPPED | OUTPATIENT
Start: 2022-03-23 | End: 2022-03-28

## 2022-03-23 NOTE — ED PROVIDER NOTES
Patient presents emerge department by EMS after a mechanical trip and fall at home today. She landed on her right hip and was unable to stand up without assistance from EMS. She denies any other injuries and has no other complaints other than pain and swelling to the right hip. The history is provided by the patient and medical records. Fall  The accident occurred less than 1 hour ago. The fall occurred while walking. She fell from a height of ground level. She landed on hard floor. There was no blood loss. The point of impact was the right hip. The pain is present in the right hip. The pain is moderate. She was not ambulatory at the scene. There was no entrapment after the fall. There was no drug use involved in the accident. There was no alcohol use involved in the accident. Pertinent negatives include no fever, no numbness, no abdominal pain, no nausea, no vomiting, no hematuria, no headaches, no extremity weakness, no hearing loss, no loss of consciousness, no tingling and no laceration. The risk factors include being elderly. The symptoms are aggravated by standing and pressure on injury. Prehospitalization: Patient received 100 mcg of fentanyl for pain management and 4 mg of Zofran for nausea by EMS. She has tried nothing for the symptoms. The treatment provided no relief.         Past Medical History:   Diagnosis Date    Agatston coronary artery calcium score less than 100 5/2014    Calcium score of 24; all in LAD; referred to cardiology    Allergic rhinitis 1/22/2014    Arthritis     Colon polyps 1/2015    H/O cardiovascular stress test 5/2014    within normal    H/O mammogram 7/2013    with Dr. Joanna Winslow Hematuria     HTN (hypertension) 1/22/2014    Hypercholesterolemia     Hypothyroidism 1/22/2014    Otitis externa     Otitis media     Pap smear for cervical cancer screening 7/2013    Dr. Joanna Winslow Thyroid disease     URI (upper respiratory infection)        Past Surgical History: Procedure Laterality Date    HX COLONOSCOPY  1/2015    repeat in 3 yrs (Dr. Winnie Hammond)    HX HIP REPLACEMENT Right 09/05/2019         Family History:   Problem Relation Age of Onset    Heart Disease Father     Dementia Mother     Hypertension Mother     Glaucoma Mother     Heart Disease Brother     Glaucoma Brother     Diabetes Brother     Cancer Sister         Colon Cancer    No Known Problems Sister     Heart Disease Other     Diabetes Other     Hypertension Paternal Uncle        Social History     Socioeconomic History    Marital status:      Spouse name: Not on file    Number of children: Not on file    Years of education: Not on file    Highest education level: Not on file   Occupational History    Not on file   Tobacco Use    Smoking status: Never Smoker    Smokeless tobacco: Never Used   Vaping Use    Vaping Use: Never used   Substance and Sexual Activity    Alcohol use: Not Currently    Drug use: No    Sexual activity: Not on file   Other Topics Concern    Not on file   Social History Narrative    Not on file     Social Determinants of Health     Financial Resource Strain:     Difficulty of Paying Living Expenses: Not on file   Food Insecurity:     Worried About [address] [facility] in the Last Year: Not on file    [address] in the Last Year: Not on file   Transportation Needs:     Lack of Transportation (Medical): Not on file    Lack of Transportation (Non-Medical):  Not on file   Physical Activity:     Days of Exercise per Week: Not on file    Minutes of Exercise per Session: Not on file   Stress:     Feeling of Stress : Not on file   Social Connections:     Frequency of Communication with Friends and Family: Not on file    Frequency of Social Gatherings with Friends and Family: Not on file    Attends Mandaeism Services: Not on file    Active Member of Clubs or Organizations: Not on file    Attends Club or Organization Meetings: Not on file    Marital Status: Not on file   Intimate Partner Violence:     Fear of Current or Ex-Partner: Not on file    Emotionally Abused: Not on file    Physically Abused: Not on file    Sexually Abused: Not on file   Housing Stability:     Unable to Pay for Housing in the Last Year: Not on file    Number of [place] in the Last Year: Not on file    Unstable Housing in the Last Year: Not on file         ALLERGIES: Patient has no known allergies. Review of Systems   Constitutional: Negative for chills and fever. Gastrointestinal: Negative for abdominal pain, nausea and vomiting. Genitourinary: Negative for hematuria. Musculoskeletal: Negative for extremity weakness. Neurological: Negative for tingling, loss of consciousness, numbness and headaches. All other systems reviewed and are negative. Vitals:    03/23/22 1309   BP: (!) 140/75   Pulse: 95   Resp: 22   Temp: 97.6 °F (36.4 °C)   SpO2: 93%   Weight: 72.6 kg (160 lb)   Height: 5' 6\" (1.676 m)            Physical Exam  Vitals and nursing note reviewed. Constitutional:       Appearance: Normal appearance. HENT:      Head: Normocephalic and atraumatic. Mouth/Throat:      Mouth: Mucous membranes are moist.      Pharynx: Oropharynx is clear. Eyes:      Extraocular Movements: Extraocular movements intact. Conjunctiva/sclera: Conjunctivae normal.      Pupils: Pupils are equal, round, and reactive to light. Cardiovascular:      Rate and Rhythm: Normal rate and regular rhythm. Pulses: Normal pulses. Pulmonary:      Effort: Pulmonary effort is normal.      Breath sounds: Normal breath sounds. Abdominal:      General: There is no distension. Palpations: Abdomen is soft. Tenderness: There is no abdominal tenderness. There is no right CVA tenderness, left CVA tenderness, guarding or rebound. Musculoskeletal:         General: Swelling and tenderness present. Cervical back: Normal range of motion and neck supple.       Comments: Some tenderness with range of motion noted of the right femur. There is some tenderness and swelling laterally in the area of the greater trochanter. No shortening or rotation is noted. No other injuries identified. Skin:     General: Skin is warm and dry. Findings: No laceration. Neurological:      General: No focal deficit present. Mental Status: She is alert. Mental status is at baseline. Psychiatric:         Mood and Affect: Mood normal.         Behavior: Behavior normal.         Thought Content: Thought content normal.          MDM  Number of Diagnoses or Management Options  Diagnosis management comments: Patient seen by Ortho in the emergency department. Deemed safe for discharge with limited weightbearing. Patient does have a walker at home to use.        Amount and/or Complexity of Data Reviewed  Tests in the radiology section of CPT®: ordered and reviewed  Discuss the patient with other providers: yes  Independent visualization of images, tracings, or specimens: yes    Risk of Complications, Morbidity, and/or Mortality  Presenting problems: moderate  Diagnostic procedures: moderate  Management options: moderate    Patient Progress  Patient progress: stable         Procedures

## 2022-03-23 NOTE — ED NOTES
I have reviewed discharge instructions with the patient. The patient verbalized understanding. Patient left ED via Discharge Method: wheelchair to Home with spouse. Opportunity for questions and clarification provided. Patient given 1 e-scripts. To continue your aftercare when you leave the hospital, you may receive an automated call from our care team to check in on how you are doing. This is a free service and part of our promise to provide the best care and service to meet your aftercare needs.  If you have questions, or wish to unsubscribe from this service please call [phone]. Thank you for Choosing our [facility] Emergency Department.

## 2022-03-23 NOTE — ED TRIAGE NOTES
Pt brought by EMS From home after tripped and fall hip replacement 2021 /82 100mcg of fentnyl 12:27 zofran 4mg,  no external rotation, R hip \"buldging\".

## 2022-04-14 ENCOUNTER — HOME HEALTH ADMISSION (OUTPATIENT)
Dept: HOME HEALTH SERVICES | Facility: HOME HEALTH | Age: 77
End: 2022-04-14
Payer: MEDICARE

## 2022-04-16 ENCOUNTER — HOME CARE VISIT (OUTPATIENT)
Dept: SCHEDULING | Facility: HOME HEALTH | Age: 77
End: 2022-04-16
Payer: MEDICARE

## 2022-04-16 VITALS
DIASTOLIC BLOOD PRESSURE: 78 MMHG | OXYGEN SATURATION: 96 % | SYSTOLIC BLOOD PRESSURE: 128 MMHG | TEMPERATURE: 97.8 F | HEART RATE: 78 BPM | RESPIRATION RATE: 16 BRPM

## 2022-04-16 PROCEDURE — 400018 HH-NO PAY CLAIM PROCEDURE

## 2022-04-16 PROCEDURE — 3331090002 HH PPS REVENUE DEBIT

## 2022-04-16 PROCEDURE — 400013 HH SOC

## 2022-04-16 PROCEDURE — 3331090001 HH PPS REVENUE CREDIT

## 2022-04-16 PROCEDURE — G0151 HHCP-SERV OF PT,EA 15 MIN: HCPCS

## 2022-04-17 PROCEDURE — 3331090001 HH PPS REVENUE CREDIT

## 2022-04-17 PROCEDURE — 3331090002 HH PPS REVENUE DEBIT

## 2022-04-18 PROCEDURE — 3331090002 HH PPS REVENUE DEBIT

## 2022-04-18 PROCEDURE — 3331090001 HH PPS REVENUE CREDIT

## 2022-04-19 ENCOUNTER — HOME CARE VISIT (OUTPATIENT)
Dept: SCHEDULING | Facility: HOME HEALTH | Age: 77
End: 2022-04-19
Payer: MEDICARE

## 2022-04-19 VITALS
HEART RATE: 88 BPM | RESPIRATION RATE: 18 BRPM | OXYGEN SATURATION: 98 % | SYSTOLIC BLOOD PRESSURE: 134 MMHG | TEMPERATURE: 98.2 F | DIASTOLIC BLOOD PRESSURE: 78 MMHG

## 2022-04-19 PROCEDURE — 3331090002 HH PPS REVENUE DEBIT

## 2022-04-19 PROCEDURE — 3331090001 HH PPS REVENUE CREDIT

## 2022-04-19 PROCEDURE — G0151 HHCP-SERV OF PT,EA 15 MIN: HCPCS

## 2022-04-20 PROCEDURE — 3331090002 HH PPS REVENUE DEBIT

## 2022-04-20 PROCEDURE — 3331090001 HH PPS REVENUE CREDIT

## 2022-04-21 ENCOUNTER — HOME CARE VISIT (OUTPATIENT)
Dept: SCHEDULING | Facility: HOME HEALTH | Age: 77
End: 2022-04-21
Payer: MEDICARE

## 2022-04-21 VITALS
SYSTOLIC BLOOD PRESSURE: 124 MMHG | TEMPERATURE: 98.2 F | RESPIRATION RATE: 18 BRPM | HEART RATE: 74 BPM | DIASTOLIC BLOOD PRESSURE: 82 MMHG | OXYGEN SATURATION: 98 %

## 2022-04-21 PROCEDURE — 3331090001 HH PPS REVENUE CREDIT

## 2022-04-21 PROCEDURE — G0151 HHCP-SERV OF PT,EA 15 MIN: HCPCS

## 2022-04-21 PROCEDURE — 3331090002 HH PPS REVENUE DEBIT

## 2022-04-22 PROCEDURE — 3331090002 HH PPS REVENUE DEBIT

## 2022-04-22 PROCEDURE — 3331090001 HH PPS REVENUE CREDIT

## 2022-04-23 PROCEDURE — 3331090002 HH PPS REVENUE DEBIT

## 2022-04-23 PROCEDURE — 3331090001 HH PPS REVENUE CREDIT

## 2022-04-24 PROCEDURE — 3331090001 HH PPS REVENUE CREDIT

## 2022-04-24 PROCEDURE — 3331090002 HH PPS REVENUE DEBIT

## 2022-04-25 PROCEDURE — 3331090002 HH PPS REVENUE DEBIT

## 2022-04-25 PROCEDURE — 3331090001 HH PPS REVENUE CREDIT

## 2022-04-26 ENCOUNTER — HOME CARE VISIT (OUTPATIENT)
Dept: SCHEDULING | Facility: HOME HEALTH | Age: 77
End: 2022-04-26
Payer: MEDICARE

## 2022-04-26 VITALS
OXYGEN SATURATION: 97 % | RESPIRATION RATE: 17 BRPM | DIASTOLIC BLOOD PRESSURE: 82 MMHG | HEART RATE: 66 BPM | TEMPERATURE: 98.4 F | SYSTOLIC BLOOD PRESSURE: 120 MMHG

## 2022-04-26 PROCEDURE — G0157 HHC PT ASSISTANT EA 15: HCPCS

## 2022-04-26 PROCEDURE — 3331090002 HH PPS REVENUE DEBIT

## 2022-04-26 PROCEDURE — 3331090001 HH PPS REVENUE CREDIT

## 2022-04-27 PROCEDURE — 3331090001 HH PPS REVENUE CREDIT

## 2022-04-27 PROCEDURE — 3331090002 HH PPS REVENUE DEBIT

## 2022-04-28 PROCEDURE — 3331090002 HH PPS REVENUE DEBIT

## 2022-04-28 PROCEDURE — 3331090001 HH PPS REVENUE CREDIT

## 2022-04-29 ENCOUNTER — HOME CARE VISIT (OUTPATIENT)
Dept: HOME HEALTH SERVICES | Facility: HOME HEALTH | Age: 77
End: 2022-04-29
Payer: MEDICARE

## 2022-04-29 ENCOUNTER — HOME CARE VISIT (OUTPATIENT)
Dept: SCHEDULING | Facility: HOME HEALTH | Age: 77
End: 2022-04-29
Payer: MEDICARE

## 2022-04-29 VITALS
SYSTOLIC BLOOD PRESSURE: 122 MMHG | RESPIRATION RATE: 17 BRPM | DIASTOLIC BLOOD PRESSURE: 80 MMHG | HEART RATE: 70 BPM | TEMPERATURE: 98.4 F | OXYGEN SATURATION: 98 %

## 2022-04-29 PROCEDURE — 3331090001 HH PPS REVENUE CREDIT

## 2022-04-29 PROCEDURE — G0157 HHC PT ASSISTANT EA 15: HCPCS

## 2022-04-29 PROCEDURE — 3331090002 HH PPS REVENUE DEBIT

## 2022-04-30 PROCEDURE — 3331090001 HH PPS REVENUE CREDIT

## 2022-04-30 PROCEDURE — 3331090002 HH PPS REVENUE DEBIT

## 2022-05-01 PROCEDURE — 3331090002 HH PPS REVENUE DEBIT

## 2022-05-01 PROCEDURE — 3331090001 HH PPS REVENUE CREDIT

## 2022-05-02 PROCEDURE — 3331090001 HH PPS REVENUE CREDIT

## 2022-05-02 PROCEDURE — 3331090002 HH PPS REVENUE DEBIT

## 2022-05-03 ENCOUNTER — HOME CARE VISIT (OUTPATIENT)
Dept: SCHEDULING | Facility: HOME HEALTH | Age: 77
End: 2022-05-03
Payer: MEDICARE

## 2022-05-03 PROCEDURE — 3331090001 HH PPS REVENUE CREDIT

## 2022-05-03 PROCEDURE — G0157 HHC PT ASSISTANT EA 15: HCPCS

## 2022-05-03 PROCEDURE — 3331090002 HH PPS REVENUE DEBIT

## 2022-05-04 VITALS
OXYGEN SATURATION: 97 % | HEART RATE: 78 BPM | TEMPERATURE: 98.3 F | RESPIRATION RATE: 18 BRPM | SYSTOLIC BLOOD PRESSURE: 120 MMHG | DIASTOLIC BLOOD PRESSURE: 74 MMHG

## 2022-05-04 PROCEDURE — 3331090002 HH PPS REVENUE DEBIT

## 2022-05-04 PROCEDURE — 3331090001 HH PPS REVENUE CREDIT

## 2022-05-05 PROCEDURE — 3331090001 HH PPS REVENUE CREDIT

## 2022-05-05 PROCEDURE — 3331090002 HH PPS REVENUE DEBIT

## 2022-05-06 ENCOUNTER — HOME CARE VISIT (OUTPATIENT)
Dept: SCHEDULING | Facility: HOME HEALTH | Age: 77
End: 2022-05-06
Payer: MEDICARE

## 2022-05-06 VITALS
SYSTOLIC BLOOD PRESSURE: 122 MMHG | OXYGEN SATURATION: 98 % | RESPIRATION RATE: 17 BRPM | DIASTOLIC BLOOD PRESSURE: 84 MMHG | TEMPERATURE: 98.3 F | HEART RATE: 66 BPM

## 2022-05-06 PROCEDURE — G0157 HHC PT ASSISTANT EA 15: HCPCS

## 2022-05-06 PROCEDURE — 3331090002 HH PPS REVENUE DEBIT

## 2022-05-06 PROCEDURE — 3331090001 HH PPS REVENUE CREDIT

## 2022-05-07 PROCEDURE — 3331090001 HH PPS REVENUE CREDIT

## 2022-05-07 PROCEDURE — 3331090002 HH PPS REVENUE DEBIT

## 2022-05-08 PROCEDURE — 3331090001 HH PPS REVENUE CREDIT

## 2022-05-08 PROCEDURE — 3331090002 HH PPS REVENUE DEBIT

## 2022-05-09 ENCOUNTER — HOME CARE VISIT (OUTPATIENT)
Dept: SCHEDULING | Facility: HOME HEALTH | Age: 77
End: 2022-05-09
Payer: MEDICARE

## 2022-05-09 VITALS
DIASTOLIC BLOOD PRESSURE: 82 MMHG | SYSTOLIC BLOOD PRESSURE: 124 MMHG | HEART RATE: 82 BPM | OXYGEN SATURATION: 99 % | TEMPERATURE: 98.2 F | RESPIRATION RATE: 18 BRPM

## 2022-05-09 PROCEDURE — 3331090002 HH PPS REVENUE DEBIT

## 2022-05-09 PROCEDURE — 3331090001 HH PPS REVENUE CREDIT

## 2022-05-09 PROCEDURE — G0151 HHCP-SERV OF PT,EA 15 MIN: HCPCS

## 2022-05-10 PROCEDURE — 3331090001 HH PPS REVENUE CREDIT

## 2022-05-10 PROCEDURE — 3331090002 HH PPS REVENUE DEBIT

## 2022-05-11 PROCEDURE — 3331090001 HH PPS REVENUE CREDIT

## 2022-05-11 PROCEDURE — 3331090002 HH PPS REVENUE DEBIT

## 2022-05-12 PROCEDURE — 3331090002 HH PPS REVENUE DEBIT

## 2022-05-12 PROCEDURE — 3331090001 HH PPS REVENUE CREDIT

## 2022-05-13 ENCOUNTER — HOME CARE VISIT (OUTPATIENT)
Dept: HOME HEALTH SERVICES | Facility: HOME HEALTH | Age: 77
End: 2022-05-13
Payer: MEDICARE

## 2022-05-13 ENCOUNTER — HOME CARE VISIT (OUTPATIENT)
Dept: SCHEDULING | Facility: HOME HEALTH | Age: 77
End: 2022-05-13
Payer: MEDICARE

## 2022-05-13 VITALS
OXYGEN SATURATION: 98 % | DIASTOLIC BLOOD PRESSURE: 68 MMHG | TEMPERATURE: 98.4 F | RESPIRATION RATE: 18 BRPM | HEART RATE: 86 BPM | SYSTOLIC BLOOD PRESSURE: 122 MMHG

## 2022-05-13 PROCEDURE — G0151 HHCP-SERV OF PT,EA 15 MIN: HCPCS

## 2022-05-13 PROCEDURE — 3331090001 HH PPS REVENUE CREDIT

## 2022-05-13 PROCEDURE — 3331090002 HH PPS REVENUE DEBIT

## 2022-05-14 PROCEDURE — 3331090002 HH PPS REVENUE DEBIT

## 2022-05-14 PROCEDURE — 3331090001 HH PPS REVENUE CREDIT

## 2022-05-15 PROCEDURE — 3331090002 HH PPS REVENUE DEBIT

## 2022-05-15 PROCEDURE — 3331090001 HH PPS REVENUE CREDIT

## 2022-05-24 ENCOUNTER — OFFICE VISIT (OUTPATIENT)
Dept: FAMILY MEDICINE CLINIC | Facility: CLINIC | Age: 77
End: 2022-05-24
Payer: MEDICARE

## 2022-05-24 VITALS — HEART RATE: 68 BPM | BODY MASS INDEX: 25.34 KG/M2 | WEIGHT: 157 LBS | OXYGEN SATURATION: 98 %

## 2022-05-24 DIAGNOSIS — E78.00 HYPERCHOLESTEROLEMIA: ICD-10-CM

## 2022-05-24 DIAGNOSIS — E28.39 ESTROGEN DEFICIENCY: ICD-10-CM

## 2022-05-24 DIAGNOSIS — D12.6 TUBULAR ADENOMA OF COLON: ICD-10-CM

## 2022-05-24 DIAGNOSIS — I10 PRIMARY HYPERTENSION: ICD-10-CM

## 2022-05-24 DIAGNOSIS — Z00.00 PREVENTATIVE HEALTH CARE: Primary | ICD-10-CM

## 2022-05-24 DIAGNOSIS — Z00.00 WELCOME TO MEDICARE PREVENTIVE VISIT: ICD-10-CM

## 2022-05-24 DIAGNOSIS — E03.4 HYPOTHYROIDISM DUE TO ACQUIRED ATROPHY OF THYROID: ICD-10-CM

## 2022-05-24 DIAGNOSIS — Z96.641 H/O TOTAL HIP ARTHROPLASTY, RIGHT: ICD-10-CM

## 2022-05-24 DIAGNOSIS — Z87.81 HISTORY OF FEMUR FRACTURE: ICD-10-CM

## 2022-05-24 DIAGNOSIS — M15.9 PRIMARY OSTEOARTHRITIS INVOLVING MULTIPLE JOINTS: ICD-10-CM

## 2022-05-24 LAB
BASOPHILS # BLD: 0 K/UL (ref 0–0.2)
BASOPHILS NFR BLD: 1 % (ref 0–2)
DIFFERENTIAL METHOD BLD: ABNORMAL
EOSINOPHIL # BLD: 0.2 K/UL (ref 0–0.8)
EOSINOPHIL NFR BLD: 3 % (ref 0.5–7.8)
ERYTHROCYTE [DISTWIDTH] IN BLOOD BY AUTOMATED COUNT: 13.5 % (ref 11.9–14.6)
HCT VFR BLD AUTO: 45.6 % (ref 35.8–46.3)
HGB BLD-MCNC: 14.4 G/DL (ref 11.7–15.4)
IMM GRANULOCYTES # BLD AUTO: 0 K/UL (ref 0–0.5)
IMM GRANULOCYTES NFR BLD AUTO: 0 % (ref 0–5)
LYMPHOCYTES # BLD: 1.7 K/UL (ref 0.5–4.6)
LYMPHOCYTES NFR BLD: 25 % (ref 13–44)
MCH RBC QN AUTO: 28.1 PG (ref 26.1–32.9)
MCHC RBC AUTO-ENTMCNC: 31.6 G/DL (ref 31.4–35)
MCV RBC AUTO: 88.9 FL (ref 79.6–97.8)
MONOCYTES # BLD: 0.7 K/UL (ref 0.1–1.3)
MONOCYTES NFR BLD: 10 % (ref 4–12)
NEUTS SEG # BLD: 4.1 K/UL (ref 1.7–8.2)
NEUTS SEG NFR BLD: 61 % (ref 43–78)
NRBC # BLD: 0 K/UL (ref 0–0.2)
PLATELET # BLD AUTO: 244 K/UL (ref 150–450)
PMV BLD AUTO: 9.1 FL (ref 9.4–12.3)
RBC # BLD AUTO: 5.13 M/UL (ref 4.05–5.2)
TSH, 3RD GENERATION: 2.14 UIU/ML (ref 0.36–3.74)
WBC # BLD AUTO: 6.7 K/UL (ref 4.3–11.1)

## 2022-05-24 PROCEDURE — G8427 DOCREV CUR MEDS BY ELIG CLIN: HCPCS | Performed by: FAMILY MEDICINE

## 2022-05-24 PROCEDURE — 1123F ACP DISCUSS/DSCN MKR DOCD: CPT | Performed by: FAMILY MEDICINE

## 2022-05-24 PROCEDURE — G8400 PT W/DXA NO RESULTS DOC: HCPCS | Performed by: FAMILY MEDICINE

## 2022-05-24 PROCEDURE — 99214 OFFICE O/P EST MOD 30 MIN: CPT | Performed by: FAMILY MEDICINE

## 2022-05-24 PROCEDURE — 1036F TOBACCO NON-USER: CPT | Performed by: FAMILY MEDICINE

## 2022-05-24 PROCEDURE — G0439 PPPS, SUBSEQ VISIT: HCPCS | Performed by: FAMILY MEDICINE

## 2022-05-24 PROCEDURE — 1090F PRES/ABSN URINE INCON ASSESS: CPT | Performed by: FAMILY MEDICINE

## 2022-05-24 PROCEDURE — G8417 CALC BMI ABV UP PARAM F/U: HCPCS | Performed by: FAMILY MEDICINE

## 2022-05-24 ASSESSMENT — PATIENT HEALTH QUESTIONNAIRE - PHQ9
SUM OF ALL RESPONSES TO PHQ QUESTIONS 1-9: 0
1. LITTLE INTEREST OR PLEASURE IN DOING THINGS: 0
SUM OF ALL RESPONSES TO PHQ QUESTIONS 1-9: 0
SUM OF ALL RESPONSES TO PHQ9 QUESTIONS 1 & 2: 0
2. FEELING DOWN, DEPRESSED OR HOPELESS: 0

## 2022-05-24 ASSESSMENT — LIFESTYLE VARIABLES: HOW OFTEN DO YOU HAVE A DRINK CONTAINING ALCOHOL: NEVER

## 2022-05-24 NOTE — PROGRESS NOTES
Medicare Annual Wellness Visit    Julio Hancock is here for Medicare AWV (pt fell in March and broke a bone in her right leg.) and Hypertension    Assessment & Plan   Preventative health care  H/O total hip arthroplasty, right  Primary osteoarthritis involving multiple joints  Primary hypertension  Hypercholesterolemia  Hypothyroidism due to acquired atrophy of thyroid  Tubular adenoma of colon  Estrogen deficiency  -     DEXA BONE DENSITY AXIAL SKELETON; Future  History of femur fracture  -     DEXA BONE DENSITY AXIAL SKELETON; Future  Welcome to Medicare preventive visit      Recommendations for Preventive Services Due: see orders and patient instructions/AVS.  Recommended screening schedule for the next 5-10 years is provided to the patient in written form: see Patient Instructions/AVS.      Needs DEXA due to recent fx  Cont PT  Await labs  Fall precautions    Return for Medicare Annual Wellness Visit in 1 year. Subjective   The following acute and/or chronic problems were also addressed today:  Recent femur fx. Healing well. Using cane. Pain in L shoulder and knees. BP has been doing well at home. No CP or SOB. No headaches or edema. No side effects with medications. Not exercising regularly, but rehab. Here for recheck of thyroid. No temperature intolerance. No palpitations. No skin or hair changes. No increased fatigue. No jitteriness. Mood good. No GI or urinary sxs. Patient's complete Health Risk Assessment and screening values have been reviewed and are found in Flowsheets. The following problems were reviewed today and where indicated follow up appointments were made and/or referrals ordered.     Positive Risk Factor Screenings with Interventions:    Fall Risk:  Do you feel unsteady or are you worried about falling? : no  2 or more falls in past year?: no  Fall with injury in past year?: (!) yes     Fall Risk Interventions:    · Home safety tips provided  · Home exercises provided to promote strength and balance     Cognitive Impairment Interventions:  · Patient advised to follow-up in this office for further evaluation and treatment within 6 month(s)  Pt with no cognitive issues             General Health and ACP:  General  In general, how would you say your health is?: Good  In the past 7 days, have you experienced any of the following: New or Increased Pain, New or Increased Fatigue, Loneliness, Social Isolation, Stress or Anger?: No  Do you get the social and emotional support that you need?: Yes  Do you have a Living Will?: Yes    Advance Directives     Power of  Living Will ACP-Advance Directive ACP-Power of     Not on File Not on File Not on File Not on File      General Health Risk Interventions:  · Poor self-assessment of health status: patient advised to follow-up in this office for further evaluation and treatment of 6 months within 6 month(s)  Pt's health assessment is good. Objective   Vitals:    05/24/22 0727   Pulse: 68   SpO2: 98%   Weight: 157 lb (71.2 kg)      Body mass index is 25.34 kg/m².         General Appearance: alert and oriented to person, place and time, well developed and well- nourished, in no acute distress  Skin: warm and dry, no rash or erythema  Head: normocephalic and atraumatic  Eyes: pupils equal, round, and reactive to light, extraocular eye movements intact, conjunctivae normal  ENT: tympanic membrane, external ear and ear canal normal bilaterally, nose without deformity, nasal mucosa and turbinates normal without polyps  Neck: supple and non-tender without mass, no thyromegaly or thyroid nodules, no cervical lymphadenopathy  Pulmonary/Chest: clear to auscultation bilaterally- no wheezes, rales or rhonchi, normal air movement, no respiratory distress  Cardiovascular: normal rate, regular rhythm, normal S1 and S2, no murmurs, rubs, clicks, or gallops, distal pulses intact, no carotid bruits  Abdomen: soft, non-tender, non-distended, normal bowel sounds, no masses or organomegaly  Pelvic: normal external genitalia, vulva, vagina, cervix, uterus and adnexa; Mild cystocele  Extremities: no cyanosis, clubbing or edema  Musculoskeletal: decreased range of motion R hip with mild tenderness, no joint swelling, deformity or tenderness  Neurologic: reflexes normal and symmetric, no cranial nerve deficit, gait, coordination and speech normal       No Known Allergies  Prior to Visit Medications    Medication Sig Taking?  Authorizing Provider   BABY ASPIRIN PO Take by mouth Yes Ar Automatic Reconciliation   amLODIPine (NORVASC) 5 MG tablet Take 5 mg by mouth daily Yes Ar Automatic Reconciliation   benazepril (LOTENSIN) 20 MG tablet Take 20 mg by mouth daily Yes Ar Automatic Reconciliation   levothyroxine (SYNTHROID) 25 MCG tablet Take 25 mcg by mouth every morning (before breakfast) Yes Ar Automatic Reconciliation   pravastatin (PRAVACHOL) 10 MG tablet Take 10 mg by mouth Yes Ar Automatic Reconciliation       CareTeam (Including outside providers/suppliers regularly involved in providing care):   Patient Care Team:  Shikha Quiñonez MD as PCP - Britney Donis MD as PCP - [facility] Empaneled Provider     Reviewed and updated this visit:  Tobacco  Allergies  Meds  Problems  Med Hx  Surg Hx  Soc Hx  Fam Hx

## 2022-05-25 LAB
ALBUMIN SERPL-MCNC: 3.9 G/DL (ref 3.2–4.6)
ALBUMIN/GLOB SERPL: 1.2 {RATIO} (ref 1.2–3.5)
ALP SERPL-CCNC: 90 U/L (ref 50–136)
ALT SERPL-CCNC: 25 U/L (ref 12–65)
ANION GAP SERPL CALC-SCNC: 7 MMOL/L (ref 7–16)
APPEARANCE UR: CLEAR
AST SERPL-CCNC: 15 U/L (ref 15–37)
BACTERIA URNS QL MICRO: ABNORMAL /HPF
BILIRUB SERPL-MCNC: 1.4 MG/DL (ref 0.2–1.1)
BILIRUB UR QL: NEGATIVE
BUN SERPL-MCNC: 11 MG/DL (ref 8–23)
CALCIUM SERPL-MCNC: 9.5 MG/DL (ref 8.3–10.4)
CHLORIDE SERPL-SCNC: 105 MMOL/L (ref 98–107)
CHOLEST SERPL-MCNC: 182 MG/DL
CO2 SERPL-SCNC: 30 MMOL/L (ref 21–32)
COLOR UR: YELLOW
CREAT SERPL-MCNC: 0.7 MG/DL (ref 0.6–1)
CRYSTALS URNS QL MICRO: ABNORMAL /LPF
EPI CELLS #/AREA URNS HPF: ABNORMAL /HPF
GLOBULIN SER CALC-MCNC: 3.3 G/DL (ref 2.3–3.5)
GLUCOSE SERPL-MCNC: 96 MG/DL (ref 65–100)
GLUCOSE UR STRIP.AUTO-MCNC: NEGATIVE MG/DL
HDLC SERPL-MCNC: 58 MG/DL (ref 40–60)
HDLC SERPL: 3.1 {RATIO}
HGB UR QL STRIP: ABNORMAL
KETONES UR QL STRIP.AUTO: NEGATIVE MG/DL
LDLC SERPL CALC-MCNC: 99.2 MG/DL
LEUKOCYTE ESTERASE UR QL STRIP.AUTO: NEGATIVE
MUCOUS THREADS URNS QL MICRO: ABNORMAL /LPF
NITRITE UR QL STRIP.AUTO: NEGATIVE
OTHER OBSERVATIONS: ABNORMAL
PH UR STRIP: 6 [PH] (ref 5–9)
POTASSIUM SERPL-SCNC: 3.7 MMOL/L (ref 3.5–5.1)
PROT SERPL-MCNC: 7.2 G/DL (ref 6.3–8.2)
PROT UR STRIP-MCNC: NEGATIVE MG/DL
RBC #/AREA URNS HPF: ABNORMAL /HPF
SODIUM SERPL-SCNC: 142 MMOL/L (ref 136–145)
SP GR UR REFRACTOMETRY: 1.02 (ref 1–1.02)
TRIGL SERPL-MCNC: 124 MG/DL (ref 35–150)
UROBILINOGEN UR QL STRIP.AUTO: 0.2 EU/DL (ref 0.2–1)
VLDLC SERPL CALC-MCNC: 24.8 MG/DL (ref 6–23)
WBC URNS QL MICRO: ABNORMAL /HPF

## 2022-06-14 ENCOUNTER — TELEPHONE (OUTPATIENT)
Dept: FAMILY MEDICINE CLINIC | Facility: CLINIC | Age: 77
End: 2022-06-14

## 2022-06-14 ENCOUNTER — HOSPITAL ENCOUNTER (OUTPATIENT)
Dept: MAMMOGRAPHY | Age: 77
Discharge: HOME | End: 2022-06-17
Payer: MEDICARE

## 2022-06-14 PROCEDURE — 77080 DXA BONE DENSITY AXIAL: CPT

## 2022-06-14 NOTE — TELEPHONE ENCOUNTER
----- Message from Cristiano Fernandez MD sent at 6/14/2022 10:15 AM EDT -----     Results within expected range.  LR

## 2022-06-15 NOTE — TELEPHONE ENCOUNTER
----- Message from Faith Booth MD sent at 6/14/2022 10:15 AM EDT -----     Results within expected range.  LR

## 2022-07-26 ENCOUNTER — OFFICE VISIT (OUTPATIENT)
Dept: ORTHOPEDIC SURGERY | Age: 77
End: 2022-07-26

## 2022-07-26 DIAGNOSIS — Z96.649 PERIPROSTHETIC FRACTURE OF PROXIMAL END OF FEMUR: ICD-10-CM

## 2022-07-26 DIAGNOSIS — Z96.641 H/O TOTAL HIP ARTHROPLASTY, RIGHT: Primary | ICD-10-CM

## 2022-07-26 DIAGNOSIS — M97.8XXA PERIPROSTHETIC FRACTURE OF PROXIMAL END OF FEMUR: ICD-10-CM

## 2022-07-26 PROCEDURE — 99024 POSTOP FOLLOW-UP VISIT: CPT | Performed by: ORTHOPAEDIC SURGERY

## 2022-07-26 NOTE — PROGRESS NOTES
Progress Note    Patient: Haiele Kaur MRN: [de-identified]  SSN: [id number]    YOB: 1945  Age: 69 y.o. Sex: female        7/26/2022      Subjective:     Patient is about 3 and half months out from right periprosthetic femur fracture around a well fixed right total hip arthroplasty. She says she feels a good bit better she still has some occasional pain especially when she lies on the right side but in general she seems to be pretty pleased. She also feels that she still has a good bit of weakness when she tries to walk    Objective: There were no vitals filed for this visit. Physical Exam:     Skin - incision is well healed with no redness or drainage  Motor and sensory function intact in RIGHT LOWER extremity  Pulses palpable in RIGHT LOWER extremity     XRAY FINDINGS:  Uztbsdapqdy-[id number] right periprosthetic proximal femur fracture, findings-AP and lateral views of the right hip shows the periprosthetic femur fracture shows significant evidence of healing and the fracture line is almost completely disappeared now. The total hip arthroplasty appears to be very well fixed and well aligned impression #healing well aligned periprosthetic femur fracture    Assessment:     Healing right periprosthetic femur fracture    Plan: This does seem to be something that is proved itself to be very stable. The prosthesis itself seems to be doing well and the patient clinically is doing well. We are can try to give her a little bit of physical therapy just to help because she does still have a good bit of weakness with her abductors especially.   This would be for full active and aggressive passive range of motion full strengthening right hip 2-3 times a week for 4 to 6 weeks I think she can follow-up with me on a as needed basis    Signed By: Adeel Hawthorne MD     July 26, 2022

## 2022-08-03 ENCOUNTER — HOSPITAL ENCOUNTER (OUTPATIENT)
Dept: PHYSICAL THERAPY | Age: 77
Setting detail: RECURRING SERIES
Discharge: HOME | End: 2022-08-06
Payer: MEDICARE

## 2022-08-03 PROCEDURE — 97110 THERAPEUTIC EXERCISES: CPT

## 2022-08-03 PROCEDURE — 97162 PT EVAL MOD COMPLEX 30 MIN: CPT

## 2022-08-03 NOTE — PROGRESS NOTES
Bernard Shah  : 1945  Primary: Medicare Part A And B  Secondary: [address] @ [facility] [address]  Phone: [phone]  Fax: [phone] Plan Frequency: 2x/wk    Plan of Care/Certification Expiration Date: 22    PT Visit Info:  Progress Note Counter: 1  No Show: 0  Canceled Appointment: 0   Visit Count:  1   OUTPATIENT PHYSICAL THERAPY:OP NOTE TYPE: Treatment Note 8/3/2022       Episode  }Appt Desk             ICD-10: Treatment Diagnosis: Muscle Weakness, Generalized (M62.81)  Pain in right hip (M25.551)  Medical/Referring Diagnosis:  Presence of right artificial hip joint [O87.043]  Referring Physician:  Brian Ladd MD MD Orders:  PT Eval and Treat, WBAT   Date of Onset:  Onset Date: 22   Allergies:   Patient has no known allergies. Restrictions/Precautions:  No data recordedNo data recorded   Interventions Planned (Treatment may consist of any combination of the following):    Current Treatment Recommendations: Strengthening; ROM; Balance training; Functional mobility training; Transfer training; Stair training; Neuromuscular re-education; Manual Therapy - Soft Tissue Mobilization; Manual Therapy - Joint Manipulation; Pain management; Integrated dry needling; Therapeutic activities; Home exercise program     Subjective Comments: See Initial Evaluation dated 8/3/22 for details     Initial:}     0/10Post Session:        0/10  Medications Last Reviewed:  8/3/2022  Updated Objective Findings:  See Initial Evaluation dated 8/3/22 for details  Treatment     THERAPEUTIC EXERCISE: (25 minutes):    Exercises per grid below to improve mobility, strength, balance, and coordination. Progressed resistance and repetitions as indicated.      Date:  8/3/22 Date:   Date:     Activity/Exercise Parameters Parameters Parameters   education Diagnosis, prognosis, POC, HEP, anatomy/physiology of condition, encouraged continued walking at home, discussed reducing environmental fall risks       sidestepping 3 min, EOB     Standing marches 3x10     Standing hip abd 3x10, B     clam 3x10     Hip hikes 3x10               THERAPEUTIC ACTIVITY: ( 0 minutes): Therapeutic activities per grid below to improve mobility, strength, coordination, and dynamic movement of right hip to improve functional lifting, carrying, reaching, catching, and overhead activities. Date:   Date:   Date:     Activity/Exercise Parameters Parameters Parameters                                                 MANUAL THERAPY: (0 minutes):   Joint mobilization, Soft tissue mobilization, and Manipulation was utilized and necessary because of the patient's restricted joint motion, painful spasm, loss of articular motion, and restricted motion of soft tissue. Date  8/3/2022      Technique Used Grade Level # Time(s) Effect while being performed                                                                                         HEP Log Date    see 8/3/22    2.     3.    4.     5.        POC    Recertification Expiration Date      Plan of Care/Certification Expiration Date: 09/28/22   Visit Count  1    Number of Allowed Visits          Treatment/Session Summary:    Treatment Assessment: See Initial Evaluation dated 8/3/22 for details     Communication/Consultation:  faxed initial evaluation to referring provider  Equipment provided today:  HEP handout  Recommendations/Intent for next treatment session: Next visit will focus on hip strengthening, .     Total Treatment Billable Duration:  25 minutes  Time In: 1050  Time Out: [address], PT       Charge Capture  }Post Session Pain  PT Visit [address] Portal  MD Guidelines  Scanned Media  Benefits  MyChart    Future Appointments   Date Time Provider Jose Lamar   8/8/2022 11:15 AM Silverio Feldman PT [facility]   8/10/2022  9:45 AM Silverio Feldman PT [facility]   8/15/2022 8:15 AM Raine Lavender, PT [facility] AND [facility]   8/17/2022  9:45 AM Raine Lavender, PT [facility]   8/22/2022  8:15 AM Raine Lavender, PT [facility]   8/24/2022  9:45 AM Raine Lavender, PT [facility]   8/29/2022  8:15 AM Raine Lavender, PT [facility]   8/31/2022  9:45 AM Raine Lavender, PT [facility]   9/7/2022  9:45 AM Raine Lavender, PT [facility]   9/8/2022 10:30 AM Raine Lavender, PT [facility]   9/12/2022  9:45 AM Raine Lavender, PT [facility]   9/14/2022  9:45 AM Raine Lavender, PT [facility]   9/19/2022  9:45 AM Raine Lavender, PT [facility]   9/21/2022  9:45 AM Raine Lavender, PT [facility]   9/26/2022  9:45 AM Raine Lavender, PT [facility]   9/28/2022  9:45 AM Raine Lavender, PT [facility]   11/25/2022  8:30 AM Pernell Ochoa MD [facility]   5/25/2023  7:45 AM Pernell Ochoa MD [facility]

## 2022-08-03 NOTE — THERAPY EVALUATION
Amy Guillen  : 1945  Primary: Medicare Part A And B  Secondary: [address] @ [address] [address]  Phone: [phone]  Fax: [phone] Plan Frequency: 2x/wk    Plan of Care/Certification Expiration Date: 22    PT Visit Info:    Progress Note Counter: 1  No Show: 0  Canceled Appointment: 0    Visit Count:  1    OUTPATIENT PHYSICAL THERAPY:OP NOTE TYPE: Initial Assessment 8/3/2022               Episode  Appt Desk         ICD-10: Treatment Diagnosis: Muscle Weakness, Generalized (M62.81)  Pain in right hip (M25.551)    Medical/Referring Diagnosis:  Presence of right artificial hip joint [Q21.227]  Referring Physician:  Bessy Galdamez MD MD Orders:  PT Eval and Treat, WBAT  Return MD Appt:  TBD  Date of Onset:  Onset Date: 22   Allergies:  Patient has no known allergies. Restrictions/Precautions:    noneNo data recordedNo data recorded   Medications Last Reviewed:  8/3/2022     SUBJECTIVE   History of Injury/Illness (Reason for Referral):  Pt states that she tripped over a rug in her house in March of this year and fell and broke her femur around her prosthesis from a KENNEDY. She was NWB for a couple of weeks on a walker and has been gradually increasing WB since then. She's now using a cane. She worked with [employer] [address] prior to coming to outpatient and she was really working on her walking. She notes she still has a \"limp\" when walking without her cane, and her goal is to get rid of her limp. She states pain has been minimal recently and she's primarily concerned with the weakness in her RLE.    Patient Stated Goal(s):  \"walk without limp\"  Initial:      010 Post Session:      010  Past Medical History/Comorbidities:   Ms. Dorcas Judd  has a past medical history of Agatston coronary artery calcium score less than 100, Allergic rhinitis, Arthritis, Colon polyps, H/O cardiovascular stress test, H/O mammogram, Hematuria, HTN (hypertension), Hypercholesterolemia, Hypothyroidism, Otitis externa, Otitis media, Pap smear for cervical cancer screening, Thyroid disease, and URI (upper respiratory infection). Ms. Bibiana Tate  has a past surgical history that includes Colonoscopy (1/2015) and Total hip arthroplasty (Right, 09/05/2019). Social History/Living Environment:   No data recorded   Prior Level of Function/Work/Activity:   Prior level of function: Independent  Current level of function: difficulty with ambulation  Occupation: [id number] recorded   Learning:   Does the patient/guardian have any barriers to learning?: No barriers  Will there be a co-learner?: No  What is the preferred language of the patient/guardian?: English  Is an  required?: No  How does the patient/guardian prefer to learn new concepts?: Listening; Demonstration; Pictures/Videos   Fall Risk Scale: Total Score: 65  Ball Fall Risk: High (45 and higher)         OBJECTIVE     Observation/Orthostatic Postural Assessment:  [] This section not tested    General observations  Description: trendelenburg gait noted with SPC. More pronounced without SPC.        Range of Motion   [] This section not tested    AROM     Hip         Knee         Ankle           PROM    Hip   R Hip Flexion 0-125: 100 deg  R Hip Internal Rotation 0-45: 15 deg  R Hip External Rotation 0-45: 40  L Hip Flexion 0-125: 95 deg  L Hip Internal Rotation 0-45: 15 deg  L Hip External Rotation 0-45: 45     Knee         Ankle            Strength  [] This section not tested      Right Left   Hip     Flexion 4-/5     Extension       Abduction 3+/5     Adduction       Knee     Flexion       Extension       Ankle     Dorsiflexion       Plantarflexion             Special Tests  [x] This section not tested    Test/Result           Joint Mobility Assessment/Palpation   [x] This section not tested    Joint         Palpation           Muscle Length/Flexibility  [x] This section patient.)  Short-Term Functional Goals: Time Frame: 4 weeks  Pt will demonstrate at least 4-/5 R hip ABD strength to improve gait mechanics. Pt will perform R SLS for at least 20 seconds to demonstrate improved balance and decrease falls risk. Pt will perform TUG in <= 9 seconds to demonstrate improved functional mobility and decreased falls risk. Discharge Goals: Time Frame: 8 weeks  Pt will improve HOOS Jr to >=85% to demonstrate improved functional abilities. Pt will perform at least 14 STS in 30 seconds without UE assist to demonstrate improved functional strength. Pt will improve self-selected walking speed to at least 3.8 ft/sec without SPC to demonstrate improved gait ability and speed. Outcome Measure: Tool Used: Hip dysfunction and Osteoarthritis Outcome Score for Joint Replacement (HOOS, JR)  Score:  Initial: 5 (Interval: 73.472) 8/3/2022 Most Recent: TBD   Interpretation of Score: The HOOS, JR contains 6 items from the original HOOS survey. Items are coded from 0 to 4, none to extreme respectively. Mely Foreman is scored by summing the raw response (range 0-24) and then converting it to an interval score using the table provided below. The interval score ranges from 0 to 100 where 0 represents total hip disability and 100 represents perfect hip health. Tool Used: Gait Speed   Score:  Initial Self Selected Walking Speed:  3.35 ft/sec Most Recent: X ft/sec (Date: -- )    Initial Max Walking Speed: 5.5 ft/sec Most Recent: X ft/sec (Date: -- )   Interpretation of Score: Walking speed is used for assessing and monitoring functional status and over all health on an individual. The individual walks for 5 meters/16.4 ft with the therapist timing. The individual has an acceleration phase of 3 meters, or 9.8ft, prior to timing is initiated. A community ambulator walks at 2.62 ft/sec to 4.32 ft/sec. The [facility] for a community dwelling older adult is a change of 0.45 ft/sec at a self selected pace.  A MDC is not yet established for a community dwelling older adult for max walking speed. Medical Necessity:   Skilled intervention continues to be required due to current impairments. Reason For Services/Other Comments:  Patient continues to require skilled intervention due to patient continues to present with impairments assessed at initial evaluation and requiring skilled physical therapy to meet goals for PT. Total Duration:  Time In: 1050  Time Out: 3762    Regarding Vandana Mooney's therapy, I certify that the treatment plan above will be carried out by a therapist or under their direction.   Thank you for this referral,  Marsha Chu, PT     Referring Physician Signature: Pooja Castellanos MD                    Post Session Pain  Charge Capture  PT Visit Info MD Guidelines  [place]

## 2022-08-08 ENCOUNTER — APPOINTMENT (OUTPATIENT)
Dept: PHYSICAL THERAPY | Age: 77
End: 2022-08-08
Payer: MEDICARE

## 2022-08-10 ENCOUNTER — HOSPITAL ENCOUNTER (OUTPATIENT)
Dept: PHYSICAL THERAPY | Age: 77
Setting detail: RECURRING SERIES
Discharge: HOME | End: 2022-08-13
Payer: MEDICARE

## 2022-08-10 PROCEDURE — 97530 THERAPEUTIC ACTIVITIES: CPT

## 2022-08-10 PROCEDURE — 97110 THERAPEUTIC EXERCISES: CPT

## 2022-08-10 NOTE — PROGRESS NOTES
Mode Thornton  : 1945  Primary: Medicare Part A And B  Secondary: [address] @ [address] [address]  Phone: [phone]  Fax: [phone] Plan Frequency: 2x/wk    Plan of Care/Certification Expiration Date: 22      PT Visit Info:  Progress Note Counter: 1  No Show: 0  Canceled Appointment: 0     Visit Count:  2   OUTPATIENT PHYSICAL THERAPY:OP NOTE TYPE: Treatment Note 8/10/2022       Episode  }Appt Desk             ICD-10: Treatment Diagnosis: Muscle Weakness, Generalized (M62.81)  Pain in right hip (M25.551)  Medical/Referring Diagnosis:  Presence of right artificial hip joint [V21.215]  Referring Physician:  Hugo English MD MD Orders:  PT Eval and Treat, WBAT   Date of Onset:  Onset Date: 22     Allergies:   Patient has no known allergies. Restrictions/Precautions:  No data recordedNo data recorded   Interventions Planned (Treatment may consist of any combination of the following):    Current Treatment Recommendations: Strengthening; ROM; Balance training; Functional mobility training; Transfer training; Stair training; Neuromuscular re-education; Manual Therapy - Soft Tissue Mobilization; Manual Therapy - Joint Manipulation; Pain management; Integrated dry needling; Therapeutic activities; Home exercise program     Subjective Comments: Doing alright. She's had some soreness from the exercises but not bad. She gets leg cramps during the bridges. Initial:}     0/10Post Session:        0/10  Medications Last Reviewed:  8/10/2022  Updated Objective Findings:  See Initial Evaluation dated 8/3/22 for details  Treatment     THERAPEUTIC EXERCISE: (29 minutes):    Exercises per grid below to improve mobility, strength, balance, and coordination. Progressed resistance and repetitions as indicated.      Date:  8/3/22 Date:  8/10/22 Date:     Activity/Exercise Parameters Parameters Parameters education Diagnosis, prognosis, POC, HEP, anatomy/physiology of condition, encouraged continued walking at home, discussed reducing environmental fall risks       sidestepping 3 min, EOB 2x40 feet, yellow band    Standing marches 3x10 3x10, yellow band    Standing hip abd 3x10, B 3x10, yellow band for R    clam 3x10     Hip hikes 3x10 3x10    Nustep  8 min, level 5, 518                                            THERAPEUTIC ACTIVITY: ( 15 minutes): Therapeutic activities per grid below to improve mobility, strength, coordination, and dynamic movement of right hip to improve functional lifting, carrying, reaching, catching, and overhead activities. Date:  8/10/22 Date:   Date:     Activity/Exercise Parameters Parameters Parameters   STS 3x10, yellow band at knees     KB deadlift Next session     Sled push/pull 3x40 feet, 50 lbs                                 MANUAL THERAPY: (0 minutes):   Joint mobilization, Soft tissue mobilization, and Manipulation was utilized and necessary because of the patient's restricted joint motion, painful spasm, loss of articular motion, and restricted motion of soft tissue. Date  8/10/2022      Technique Used Grade Level # Time(s) Effect while being performed                                                                                         HEP Log Date    see 8/3/22    2.     3.    4.     5.        POC    Recertification Expiration Date      Plan of Care/Certification Expiration Date: 09/28/22     Visit Count  2    Number of Allowed Visits          Treatment/Session Summary:    Treatment Assessment: Pt continues to demonstrate trendelenburg gait on R stance. Increased volume of C hip strengthening exercises today with some fatigue noted. Pt would continue to benefit from progressive strengthening to improve gait mechanics.       Communication/Consultation:  faxed initial evaluation to referring provider  Equipment provided today:  HEP handout  Recommendations/Intent for next treatment session: Next visit will focus on hip strengthening, .     Total Treatment Billable Duration:  44 minutes  Time In: 6026  Time Out: [address], PT       Charge Capture  }Post Session Pain  PT Visit [address] Portal  MD Guidelines  Scanned Media  Benefits  MyChart    Future Appointments   Date Time Provider Jose Brianda   8/12/2022  9:45 AM Geralene Rinks[facility]   8/15/2022  8:15 AM Geralene Rinks, PT [facility]   8/17/2022  9:45 AM Geralene Rinks, PT [facility]   8/22/2022  8:15 AM Geralene Rinks, PT [facility]   8/24/2022  9:45 AM Geralene Rinks, PT [facility]   8/29/2022  8:15 AM Geralene Rinks, PT [facility]   8/31/2022  9:45 AM Geralene Rinks, PT [facility]   9/7/2022  9:45 AM Geralene Rinks, PT [facility]   9/8/2022 10:30 AM Geralene Rinks, PT [facility]   9/12/2022  9:45 AM Geralene Rinks, PT [facility]   9/14/2022  9:45 AM Geralene Rinks, PT [facility]   9/19/2022  9:45 AM Geralene Rinks, PT [facility]   9/21/2022  9:45 AM Geralene Rinks, PT [facility]   9/26/2022  9:45 AM Geralene Rinks, PT [facility]   9/28/2022  9:45 AM Geralene Rinks, PT [facility]   11/25/2022  8:30 AM Beryl Townsend MD [facility]   5/25/2023  7:45 AM Beryl Townsend MD [facility]

## 2022-08-12 ENCOUNTER — HOSPITAL ENCOUNTER (OUTPATIENT)
Dept: PHYSICAL THERAPY | Age: 77
Setting detail: RECURRING SERIES
Discharge: HOME | End: 2022-08-15
Payer: MEDICARE

## 2022-08-12 PROCEDURE — 97110 THERAPEUTIC EXERCISES: CPT

## 2022-08-12 PROCEDURE — 97530 THERAPEUTIC ACTIVITIES: CPT

## 2022-08-12 NOTE — PROGRESS NOTES
Vladimir King  : 1945  Primary: Medicare Part A And B  Secondary: [address] @ [address] [facility] [address]  Phone: [phone]  Fax: [phone] Plan Frequency: 2x/wk    Plan of Care/Certification Expiration Date: 22      PT Visit Info:  Progress Note Counter: 1  No Show: 0  Canceled Appointment: 0     Visit Count:  3   OUTPATIENT PHYSICAL THERAPY:OP NOTE TYPE: Treatment Note 2022       Episode  }Appt Desk             ICD-10: Treatment Diagnosis: Muscle Weakness, Generalized (M62.81)  Pain in right hip (M25.551)  Medical/Referring Diagnosis:  Presence of right artificial hip joint [Y34.111]  Referring Physician:  Fredo Rosales MD MD Orders:  PT Eval and Treat, WBAT   Date of Onset:  Onset Date: 22     Allergies:   Patient has no known allergies. Restrictions/Precautions:  No data recordedNo data recorded   Interventions Planned (Treatment may consist of any combination of the following):    Current Treatment Recommendations: Strengthening; ROM; Balance training; Functional mobility training; Transfer training; Stair training; Neuromuscular re-education; Manual Therapy - Soft Tissue Mobilization; Manual Therapy - Joint Manipulation; Pain management; Integrated dry needling; Therapeutic activities; Home exercise program     Subjective Comments: Having some muscle soreness in her shoulders. Initial:}     0/10Post Session:        0/10  Medications Last Reviewed:  2022  Updated Objective Findings:  See Initial Evaluation dated 8/3/22 for details  Treatment     THERAPEUTIC EXERCISE: (21 minutes):    Exercises per grid below to improve mobility, strength, balance, and coordination. Progressed resistance and repetitions as indicated.      Date:  8/3/22 Date:  8/10/22 Date:  22   Activity/Exercise Parameters Parameters Parameters   education Diagnosis, prognosis, POC, HEP, anatomy/physiology of condition, encouraged continued walking at home, discussed reducing environmental fall risks       sidestepping 3 min, EOB 2x40 feet, yellow band 2x40 feet, yellow band + monster walks   Standing marches 3x10 3x10, yellow band 3x10, yellow band   Standing hip abd 3x10, B 3x10, yellow band for R    clam 3x10     Hip hikes 3x10 3x10 3x10   Nustep  8 min, level 5, 518 8 min, level 5, 553 steps                                           THERAPEUTIC ACTIVITY: ( 20 minutes): Therapeutic activities per grid below to improve mobility, strength, coordination, and dynamic movement of right hip to improve functional lifting, carrying, reaching, catching, and overhead activities. Date:  8/10/22 Date:  8/12/22 Date:     Activity/Exercise Parameters Parameters Parameters   STS 3x10, yellow band at knees 3x10, yellow band at knees    KB deadlift Next session     Sled push/pull 3x40 feet, 50 lbs 3x40 feet, 50 lbs    Walking marches  2x40 feet, with pause                          MANUAL THERAPY: (0 minutes):   Joint mobilization, Soft tissue mobilization, and Manipulation was utilized and necessary because of the patient's restricted joint motion, painful spasm, loss of articular motion, and restricted motion of soft tissue. Date  8/12/2022      Technique Used Grade Level # Time(s) Effect while being performed                                                                                         HEP Log Date    see 8/3/22    2.     3.    4.     5.        POC    Recertification Expiration Date      Plan of Care/Certification Expiration Date: 09/28/22     Visit Count  3    Number of Allowed Visits          Treatment/Session Summary:    Treatment Assessment: Pt continues to demonstrate trendelenburg gait on R stance. Continued with [facility] hip strengthening exercises with no negative signs or symptoms.       Communication/Consultation:  faxed initial evaluation to referring provider  Equipment provided today:  HEP handout  Recommendations/Intent for next treatment session: Next visit will focus on hip strengthening, .     Total Treatment Billable Duration:  41 minutes  Time In: 0492  Time Out: [address], PT       Charge Capture  }Post Session Pain  PT Visit [address] Portal  MD Guidelines  Scanned Media  Benefits  MyChart    Future Appointments   Date Time Provider Jose Brianda   8/15/2022  8:15 AM Camryn Foster, PT [facility]   8/17/2022  9:45 AM Camryn Foster, PT [facility]   8/22/2022  8:15 AM Camryn Foster, PT [facility]   8/24/2022  9:45 AM Camryn Foster, PT [facility]   8/29/2022  8:15 AM Camryn Foster, PT [facility]   8/31/2022  9:45 AM Camryn Foster, PT [facility]   9/7/2022  9:45 AM Camryn Foster, PT [facility]   9/8/2022 10:30 AM Camryn Foster, PT [facility]   9/12/2022  9:45 AM Camryn Foster, PT [facility]   9/14/2022  9:45 AM Camryn Foster, PT [facility]   9/19/2022  9:45 AM Camryn Foster, PT [facility]   9/21/2022  9:45 AM Camryn Foster, PT [facility]   9/26/2022  9:45 AM Camryn Foster, PT [facility]   9/28/2022  9:45 AM Camryn Foster, PT [facility]   11/25/2022  8:30 AM Kevin Perea MD [facility]   5/25/2023  7:45 AM Kevin Perea MD [facility]

## 2022-08-15 ENCOUNTER — HOSPITAL ENCOUNTER (OUTPATIENT)
Dept: PHYSICAL THERAPY | Age: 77
Setting detail: RECURRING SERIES
Discharge: HOME | End: 2022-08-18
Payer: MEDICARE

## 2022-08-15 PROCEDURE — 97530 THERAPEUTIC ACTIVITIES: CPT

## 2022-08-15 PROCEDURE — 97110 THERAPEUTIC EXERCISES: CPT

## 2022-08-15 NOTE — PROGRESS NOTES
Toma Orellana  : 1945  Primary: Medicare Part A And B  Secondary: [address] @ [address] [address]  Phone: [phone]  Fax: [phone] Plan Frequency: 2x/wk    Plan of Care/Certification Expiration Date: 22      PT Visit Info:  Progress Note Counter: 1  No Show: 0  Canceled Appointment: 0     Visit Count:  4   OUTPATIENT PHYSICAL THERAPY:OP NOTE TYPE: Treatment Note 8/15/2022       Episode  }Appt Desk             ICD-10: Treatment Diagnosis: Muscle Weakness, Generalized (M62.81)  Pain in right hip (M25.551)  Medical/Referring Diagnosis:  Presence of right artificial hip joint [T22.295]  Referring Physician:  Autumn Cruz MD MD Orders:  PT Eval and Treat, WBAT   Date of Onset:  Onset Date: 22     Allergies:   Patient has no known allergies. Restrictions/Precautions:  No data recordedNo data recorded   Interventions Planned (Treatment may consist of any combination of the following):    Current Treatment Recommendations: Strengthening; ROM; Balance training; Functional mobility training; Transfer training; Stair training; Neuromuscular re-education; Manual Therapy - Soft Tissue Mobilization; Manual Therapy - Joint Manipulation; Pain management; Integrated dry needling; Therapeutic activities; Home exercise program     Subjective Comments: A little slow today - didn't sleep well. Initial:}     0/10Post Session:        0/10  Medications Last Reviewed:  8/15/2022  Updated Objective Findings:  See Initial Evaluation dated 8/3/22 for details  Treatment     THERAPEUTIC EXERCISE: (16 minutes):    Exercises per grid below to improve mobility, strength, balance, and coordination. Progressed resistance and repetitions as indicated.      Date:  8/3/22 Date:  8/10/22 Date:  22 Date  8/15/22   Activity/Exercise Parameters Parameters Parameters    education Diagnosis, prognosis, POC, HEP, anatomy/physiology of condition, encouraged continued walking at home, discussed reducing environmental fall risks        sidestepping 3 min, EOB 2x40 feet, yellow band 2x40 feet, yellow band + monster walks 2x40 feet, red  band + monster walks   Standing marches 3x10 3x10, yellow band 3x10, yellow band    Standing hip abd 3x10, B 3x10, yellow band for R     clam 3x10      Hip hikes 3x10 3x10 3x10    Nustep  8 min, level 5, 518 8 min, level 5, 553 steps 8 min, level 5, 527 steps                                                 THERAPEUTIC ACTIVITY: ( 30 minutes): Therapeutic activities per grid below to improve mobility, strength, coordination, and dynamic movement of right hip to improve functional lifting, carrying, reaching, catching, and overhead activities. Date:  8/10/22 Date:  8/12/22 Date:  8/15/22   Activity/Exercise Parameters Parameters Parameters   STS 3x10, yellow band at knees 3x10, yellow band at knees 3x10, chair height no band   KB deadlift Next session     Sled push/pull 3x40 feet, 50 lbs 3x40 feet, 50 lbs 3x40 feet, 60 lbs   Walking marches  2x40 feet, with pause 2x40 feet, with pause   Step up   3x10, RLE, 4 inch   Lateral step up and over   X10, 4 inch             MANUAL THERAPY: (0 minutes):   Joint mobilization, Soft tissue mobilization, and Manipulation was utilized and necessary because of the patient's restricted joint motion, painful spasm, loss of articular motion, and restricted motion of soft tissue.               Date  8/15/2022      Technique Used Grade Level # Time(s) Effect while being performed                                                                                         HEP Log Date    see 8/3/22    2.     3.    4.     5.        POC    Recertification Expiration Date      Plan of Care/Certification Expiration Date: 09/28/22     Visit Count  4    Number of Allowed Visits          Treatment/Session Summary:    Treatment Assessment: Continued focus on lateral hip and general LE strengthening. Pt continues to demonstrate trendelenburg gait but appeared grossly improved compared to previous sessions. Added step up activities and pt required intermittent verbal cueing for hip hiking. Also increased resistance for established exercises as noted with good tolerance, indicating improving strength. Communication/Consultation:  faxed initial evaluation to referring provider  Equipment provided today:  HEP handout  Recommendations/Intent for next treatment session: Next visit will focus on hip strengthening, .     Total Treatment Billable Duration:  46 minutes  Time In: 7098  Time Out: 0945    Mikayla Mancia, PT       Charge Capture  }Post Session Pain  PT Visit [address] [facility] Portal  MD Guidelines  Scanned Media  Benefits  MyChart    Future Appointments   Date Time Provider Jose Lamar   8/17/2022  9:45 AM Mikayla Mancia, PT [facility]   8/22/2022  8:15 AM Mikayla Mancia, PT [facility]   8/24/2022  9:45 AM Mikayla Mancia, PT [facility]   8/29/2022  8:15 AM Mikayla Mancia, PT [facility] [place] [facility]   8/31/2022  9:45 AM Mikaylafaith Mancia, PT [facility]   9/7/2022  9:45 AM Mikayla Magdalenaly, PT [facility]   9/8/2022 10:30 AM Mikayla Burdana, PT [facility]   9/12/2022  9:45 AM Mikayla Magdalenaly, PT [facility]   9/14/2022  9:45 AM Mikaylafaith Mancia, PT [facility]   9/19/2022  9:45 AM Mikayla Burly, PT [facility]   9/21/2022  9:45 AM Mikayla Burly, PT [facility]   9/26/2022  9:45 AM Mikayla Burly, PT [facility]   9/28/2022  9:45 AM Mikayla Burly, PT [facility]   11/25/2022  8:30 AM Анна Alvarez MD [facility]   5/25/2023  7:45 AM Анна Alvarez MD [facility]

## 2022-08-17 ENCOUNTER — HOSPITAL ENCOUNTER (OUTPATIENT)
Dept: PHYSICAL THERAPY | Age: 77
Setting detail: RECURRING SERIES
Discharge: HOME | End: 2022-08-20
Payer: MEDICARE

## 2022-08-17 PROCEDURE — 97110 THERAPEUTIC EXERCISES: CPT

## 2022-08-17 PROCEDURE — 97530 THERAPEUTIC ACTIVITIES: CPT

## 2022-08-17 NOTE — PROGRESS NOTES
Qi Landrum  : 1945  Primary: Medicare Part A [place] B  Secondary: [address] @ [address] [facility] [address]  Phone: [phone]  Fax: [phone] Plan Frequency: 2x/wk    Plan of Care/Certification Expiration Date: 22      PT Visit Info:  Progress Note Counter: 1  No Show: 0  Canceled Appointment: 0     Visit Count:  5   OUTPATIENT PHYSICAL THERAPY:OP NOTE TYPE: Treatment Note 2022       Episode  }Appt Desk             ICD-10: Treatment Diagnosis: Muscle Weakness, Generalized (M62.81)  Pain in right hip (M25.551)  Medical/Referring Diagnosis:  Presence of right artificial hip joint [Z71.401]  Referring Physician:  Zeke Ortiz MD MD Orders:  PT Eval and Treat, WBAT   Date of Onset:  Onset Date: 22     Allergies:   Patient has no known allergies. Restrictions/Precautions:  No data recordedNo data recorded   Interventions Planned (Treatment may consist of any combination of the following):    Current Treatment Recommendations: Strengthening; ROM; Balance training; Functional mobility training; Transfer training; Stair training; Neuromuscular re-education; Manual Therapy - Soft Tissue Mobilization; Manual Therapy - Joint Manipulation; Pain management; Integrated dry needling; Therapeutic activities; Home exercise program     Subjective Comments: Was only sore after last session for ~1 day. Initial:}     0/10Post Session:        0/10  Medications Last Reviewed:  2022  Updated Objective Findings:  See Initial Evaluation dated 8/3/22 for details  Treatment     THERAPEUTIC EXERCISE: (15 minutes):    Exercises per grid below to improve mobility, strength, balance, and coordination. Progressed resistance and repetitions as indicated.      Date:  8/3/22 Date:  8/10/22 Date:  22 Date  8/15/22 Date  22   Activity/Exercise Parameters Parameters Parameters     education Diagnosis, prognosis, POC, HEP, anatomy/physiology of condition, encouraged continued walking at home, discussed reducing environmental fall risks         sidestepping 3 min, EOB 2x40 feet, yellow band 2x40 feet, yellow band + monster walks 2x40 feet, red  band + monster walks 2x40 feet, red  band + monster walks   Standing marches 3x10 3x10, yellow band 3x10, yellow band  (Hip circles) 3x10, against wall   Standing hip abd 3x10, B 3x10, yellow band for R      clam 3x10       Hip hikes 3x10 3x10 3x10     Nustep  8 min, level 5, 518 8 min, level 5, 553 steps 8 min, level 5, 527 steps 8 min, level 5, 600 steps                                                       THERAPEUTIC ACTIVITY: ( 29 minutes): Therapeutic activities per grid below to improve mobility, strength, coordination, and dynamic movement of right hip to improve functional lifting, carrying, reaching, catching, and overhead activities. Date:  8/10/22 Date:  8/12/22 Date:  8/15/22 Date  8/17/22   Activity/Exercise Parameters Parameters Parameters    STS 3x10, yellow band at knees 3x10, yellow band at knees 3x10, chair height no band 3x10x8 lbs, chair height no band   KB deadlift Next session      Sled push/pull 3x40 feet, 50 lbs 3x40 feet, 50 lbs 3x40 feet, 60 lbs 2x40 feet, 75 lbs   Walking marches  2x40 feet, with pause 2x40 feet, with pause 2x40 feet, with pause   Step up   3x10, RLE, 4 inch 3x10, RLE, 4 inch   Lateral step up and over   X10, 4 inch X10, 4 inch              MANUAL THERAPY: (0 minutes):   Joint mobilization, Soft tissue mobilization, and Manipulation was utilized and necessary because of the patient's restricted joint motion, painful spasm, loss of articular motion, and restricted motion of soft tissue.               Date  8/17/2022      Technique Used Grade Level # Time(s) Effect while being performed                                                                                         HEP Log Date    see 8/3/22    2.     3.    4.     5.        POC Recertification Expiration Date      Plan of Care/Certification Expiration Date: 09/28/22     Visit Count  5    Number of Allowed Visits          Treatment/Session Summary:    Treatment Assessment: Pt demonstrated grossly improved ease with exercises today, indicating improving strength. Pt also able to somewhat correct L hip drop with step ups intermittently, indicating improving motor control and strength. Communication/Consultation:  faxed initial evaluation to referring provider  Equipment provided today:  HEP handout  Recommendations/Intent for next treatment session: Next visit will focus on hip strengthening, .     Total Treatment Billable Duration:  44 minutes  Time In: 2988  Time Out: [address], PT       Charge Capture  }Post Session Pain  PT Visit [address] Portal  MD Guidelines  Scanned Media  Benefits  MyChart    Future Appointments   Date Time Provider Jose Lamar   8/22/2022  8:15 AM Lindsay Render, PT [facility]   8/24/2022  9:45 AM Lindsay Render, PT [facility]   8/29/2022  8:15 AM Lindsay Render, PT [facility] [place] [facility]   8/31/2022  9:45 AM Lindsay Render, PT [facility]   9/7/2022  9:45 AM Lindsay Render, PT [facility]   9/8/2022 10:30 AM Lindsay Render, PT [facility]   9/12/2022  9:45 AM Lindsay Render, PT [facility]   9/14/2022  9:45 AM Lindsay Render, PT [facility]   9/19/2022  9:45 AM Lindsay Render, PT [facility]   9/21/2022  9:45 AM Lindsay Render, PT [facility]   9/26/2022  9:45 AM Lindsay Render, PT [facility]   9/28/2022  9:45 AM Lindsay Render, PT [facility]   11/25/2022  8:30 AM Anne Combs MD [facility]   5/25/2023  7:45 AM Anne Combs MD [facility]

## 2022-08-22 ENCOUNTER — HOSPITAL ENCOUNTER (OUTPATIENT)
Dept: PHYSICAL THERAPY | Age: 77
Setting detail: RECURRING SERIES
Discharge: HOME | End: 2022-08-25
Payer: MEDICARE

## 2022-08-22 PROCEDURE — 97110 THERAPEUTIC EXERCISES: CPT

## 2022-08-22 PROCEDURE — 97530 THERAPEUTIC ACTIVITIES: CPT

## 2022-08-22 NOTE — PROGRESS NOTES
Regla Faith  : 1945  Primary: Medicare Part A And B  Secondary: [address] @ [address] [address]  Phone: [phone]  Fax: [phone] Plan Frequency: 2x/wk    Plan of Care/Certification Expiration Date: 22      PT Visit Info:  Progress Note Counter: 1  No Show: 0  Canceled Appointment: 0     Visit Count:  6   OUTPATIENT PHYSICAL THERAPY:OP NOTE TYPE: Treatment Note 2022       Episode  }Appt Desk             ICD-10: Treatment Diagnosis: Muscle Weakness, Generalized (M62.81)  Pain in right hip (M25.551)  Medical/Referring Diagnosis:  Presence of right artificial hip joint [T95.661]  Referring Physician:  Jakub Mejia MD MD Orders:  PT Eval and Treat, WBAT   Date of Onset:  Onset Date: 22     Allergies:   Patient has no known allergies. Restrictions/Precautions:  No data recordedNo data recorded   Interventions Planned (Treatment may consist of any combination of the following):    Current Treatment Recommendations: Strengthening; ROM; Balance training; Functional mobility training; Transfer training; Stair training; Neuromuscular re-education; Manual Therapy - Soft Tissue Mobilization; Manual Therapy - Joint Manipulation; Pain management; Integrated dry needling; Therapeutic activities; Home exercise program     Subjective Comments: No new complaints. Just a little stiff as she usually is first thing in the morning. Initial:}     0/10Post Session:        0/10  Medications Last Reviewed:  2022  Updated Objective Findings:  See Initial Evaluation dated 8/3/22 for details  Treatment     THERAPEUTIC EXERCISE: (15 minutes):    Exercises per grid below to improve mobility, strength, balance, and coordination. Progressed resistance and repetitions as indicated.      Date:  8/3/22 Date:  8/10/22 Date:  22 Date  8/15/22 Date  22 Date  22   Activity/Exercise Parameters Parameters Parameters      education Diagnosis, prognosis, POC, HEP, anatomy/physiology of condition, encouraged continued walking at home, discussed reducing environmental fall risks          sidestepping 3 min, EOB 2x40 feet, yellow band 2x40 feet, yellow band + monster walks 2x40 feet, red  band + monster walks 2x40 feet, red  band + monster walks 2x40 feet, red  band + monster walks   Standing marches 3x10 3x10, yellow band 3x10, yellow band  (Hip circles) 3x10, against wall (Hip circles) 3x10, against wall, light green band   Standing hip abd 3x10, B 3x10, yellow band for R       clam 3x10        Hip hikes 3x10 3x10 3x10      Nustep  8 min, level 5, 518 8 min, level 5, 553 steps 8 min, level 5, 527 steps 8 min, level 5, 600 steps 8 min, level 5, 576 steps                                                             THERAPEUTIC ACTIVITY: ( 30 minutes): Therapeutic activities per grid below to improve mobility, strength, coordination, and dynamic movement of right hip to improve functional lifting, carrying, reaching, catching, and overhead activities.    Date:  8/10/22 Date:  8/12/22 Date:  8/15/22 Date  8/17/22 Date  8/22/22   Activity/Exercise Parameters Parameters Parameters     STS 3x10, yellow band at knees 3x10, yellow band at knees 3x10, chair height no band 3x10x8 lbs, chair height no band    KB deadlift Next session       Sled push/pull 3x40 feet, 50 lbs 3x40 feet, 50 lbs 3x40 feet, 60 lbs 2x40 feet, 75 lbs 3x40 feet, 75 lbs   Walking marches  2x40 feet, with pause 2x40 feet, with pause 2x40 feet, with pause 2x40 feet, with pause   Step up   3x10, RLE, 4 inch 3x10, RLE, 4 inch 3x10x5 lbs, RLE, 4 inch   Lateral step up and over   X10, 4 inch X10, 4 inch    Cross over step ups     2x10, 4 inch       MANUAL THERAPY: (0 minutes):   Joint mobilization, Soft tissue mobilization, and Manipulation was utilized and necessary because of the patient's restricted joint motion, painful spasm, loss of articular motion, and restricted motion of soft tissue. Date  8/22/2022      Technique Used Grade Level # Time(s) Effect while being performed                                                                                         HEP Log Date    see 8/3/22    2.     3.    4.     5.        POC    Recertification Expiration Date      Plan of Care/Certification Expiration Date: 09/28/22     Visit Count  6    Number of Allowed Visits          Treatment/Session Summary:    Treatment Assessment: Able to increase resistance for established exercises as noted with no negative signs or symptoms. Introduced cross over step ups today to bias lateral hip more and to address balance. Pt appropriately challenged by addition. Pt continues to demonstrate grossly improved gait mechanics each session. Communication/Consultation:  faxed initial evaluation to referring provider  Equipment provided today:  HEP handout  Recommendations/Intent for next treatment session: Next visit will focus on hip strengthening, .     Total Treatment Billable Duration:  45 minutes  Time In: 4677  Time Out: 0900    Mirella Fling, PT       Charge Capture  }Post Session Pain  PT Visit Info  [employer] Portal  MD Guidelines  Scanned Media  Benefits  MyChart    Future Appointments   Date Time Provider Jose Lamar   8/24/2022  9:45 AM Mirella Fling, PT [facility]   8/29/2022  8:15 AM Mirella Fling, PT [facility]   8/31/2022  9:45 AM Mirella Fling, PT [facility] [place] [facility]   9/7/2022  9:45 AM Mirella Fling, PT [facility] [place] [facility]   9/8/2022 10:30 AM Mirella Fling, PT [facility] [place] [facility]   9/12/2022  9:45 AM Mirella Fling, PT [facility]   9/14/2022  9:45 AM Mirella Fling, PT [facility]   9/19/2022  9:45 AM Mirella Fling, PT [facility]   9/21/2022  9:45 AM Mirella Fling, PT [facility]   9/26/2022  9:45 AM Mirella Fling, PT [facility]   9/28/2022  9:45 AM Mirella Fling, PT [facility]   11/25/2022  8:30 AM Terie Landau, MD [facility]   5/25/2023  7:45 AM Terie Landau, MD [facility]

## 2022-08-24 ENCOUNTER — HOSPITAL ENCOUNTER (OUTPATIENT)
Dept: PHYSICAL THERAPY | Age: 77
Setting detail: RECURRING SERIES
Discharge: HOME | End: 2022-08-27
Payer: MEDICARE

## 2022-08-24 PROCEDURE — 97110 THERAPEUTIC EXERCISES: CPT

## 2022-08-24 PROCEDURE — 97530 THERAPEUTIC ACTIVITIES: CPT

## 2022-08-24 NOTE — PROGRESS NOTES
Monique Negro  : 1945  Primary: Medicare Part A And B  Secondary: [address] @ [address] [facility] [address]  Phone: [phone]  Fax: [phone] Plan Frequency: 2x/wk    Plan of Care/Certification Expiration Date: 22      PT Visit Info:  Progress Note Counter: 1  No Show: 0  Canceled Appointment: 0     Visit Count:  7   OUTPATIENT PHYSICAL THERAPY:OP NOTE TYPE: Treatment Note 2022       Episode  }Appt Desk             ICD-10: Treatment Diagnosis: Muscle Weakness, Generalized (M62.81)  Pain in right hip (M25.551)  Medical/Referring Diagnosis:  Presence of right artificial hip joint [W94.980]  Referring Physician:  Annika Cantor MD MD Orders:  PT Eval and Treat, WBAT   Date of Onset:  Onset Date: 22     Allergies:   Patient has no known allergies. Restrictions/Precautions:  No data recordedNo data recorded   Interventions Planned (Treatment may consist of any combination of the following):    Current Treatment Recommendations: Strengthening; ROM; Balance training; Functional mobility training; Transfer training; Stair training; Neuromuscular re-education; Manual Therapy - Soft Tissue Mobilization; Manual Therapy - Joint Manipulation; Pain management; Integrated dry needling; Therapeutic activities; Home exercise program     Subjective Comments: Was sore after last session but not bad. Pulled weeds for a couple of hours yesterday. Initial:}     0/10Post Session:        0/10  Medications Last Reviewed:  2022  Updated Objective Findings:  See Initial Evaluation dated 8/3/22 for details  Treatment     THERAPEUTIC EXERCISE: (13 minutes):    Exercises per grid below to improve mobility, strength, balance, and coordination. Progressed resistance and repetitions as indicated.      Date:  8/3/22 Date:  8/10/22 Date:  22 Date  8/15/22 Date  22 Date  22 Date  22 Activity/Exercise Parameters Parameters Parameters       education Diagnosis, prognosis, POC, HEP, anatomy/physiology of condition, encouraged continued walking at home, discussed reducing environmental fall risks           sidestepping 3 min, EOB 2x40 feet, yellow band 2x40 feet, yellow band + monster walks 2x40 feet, red  band + monster walks 2x40 feet, red  band + monster walks 2x40 feet, red  band + monster walks 2x40 feet, red  band + monster walks   Standing marches 3x10 3x10, yellow band 3x10, yellow band  (Hip circles) 3x10, against wall (Hip circles) 3x10, against wall, light green band    Standing hip abd 3x10, B 3x10, yellow band for R        clam 3x10         Hip hikes 3x10 3x10 3x10       Nustep  8 min, level 5, 518 8 min, level 5, 553 steps 8 min, level 5, 527 steps 8 min, level 5, 600 steps 8 min, level 5, 576 steps 8 min, level 5, 602 steps                                                                   THERAPEUTIC ACTIVITY: ( 26 minutes): Therapeutic activities per grid below to improve mobility, strength, coordination, and dynamic movement of right hip to improve functional lifting, carrying, reaching, catching, and overhead activities.    Date:  8/10/22 Date:  8/12/22 Date:  8/15/22 Date  8/17/22 Date  8/22/22 Date  8/24/22   Activity/Exercise Parameters Parameters Parameters      STS 3x10, yellow band at knees 3x10, yellow band at knees 3x10, chair height no band 3x10x8 lbs, chair height no band  [id number] lbs, chair height no band   KB deadlift Next session        Sled push/pull 3x40 feet, 50 lbs 3x40 feet, 50 lbs 3x40 feet, 60 lbs 2x40 feet, 75 lbs 3x40 feet, 75 lbs 3x40 feet, 75 lbs   Walking marches  2x40 feet, with pause 2x40 feet, with pause 2x40 feet, with pause 2x40 feet, with pause    Step up   3x10, RLE, 4 inch 3x10, RLE, 4 inch 3x10x5 lbs, RLE, 4 inch    Lateral step up and over   X10, 4 inch X10, 4 inch     Cross over step ups     2x10, 4 inch 2x10, 6 inch   Farmer's carry      3 large laps, 8 lbs       MANUAL THERAPY: (0 minutes):   Joint mobilization, Soft tissue mobilization, and Manipulation was utilized and necessary because of the patient's restricted joint motion, painful spasm, loss of articular motion, and restricted motion of soft tissue. Date  8/24/2022      Technique Used Grade Level # Time(s) Effect while being performed                                                                                         HEP Log Date    see 8/3/22    2.     3.    4.     5.        POC    Recertification Expiration Date      Plan of Care/Certification Expiration Date: 09/28/22     Visit Count  7    Number of Allowed Visits          Treatment/Session Summary:    Treatment Assessment: Increased resistance as noted today with good tolerance, indicating improving strength. Pt fatigues with exercises and requires seated rest breaks. Pt also demonstrates more hip drop with fatigue, indicating further need for strengthening and endurance. Communication/Consultation:  faxed initial evaluation to referring provider  Equipment provided today:  HEP handout  Recommendations/Intent for next treatment session: Next visit will focus on hip strengthening, floor transfers.     Total Treatment Billable Duration:  39 minutes  Time In: 0945  Time Out: 1024    Anusha Claros PT       Charge Capture  }Post Session Pain  PT Visit [address] [facility] Portal  MD Guidelines  Scanned Media  Benefits  MyChart    Future Appointments   Date Time Provider Jose Lamar   8/29/2022  8:15 AM Anusha Claros, PT [facility]   8/31/2022  9:45 AM Anusha Claros, PT [facility]   9/7/2022  9:45 AM Anusha Claros, PT [facility]   9/8/2022 10:30 AM Anusha Claros, PT [facility]   9/12/2022  9:45 AM Anusha Claros, PT [facility]   9/14/2022  9:45 AM Anusha Claros, PT [facility]   9/19/2022  9:45 AM Anusha Claros, PT [facility]   9/21/2022  9:45 AM Anusha Claros, PT [facility]   9/26/2022  9:45 AM Anusha Claros, PT [facility] 9/28/2022  9:45 AM Sammi Bumpers, PT [facility]   11/25/2022  8:30 AM Bart Gonzales MD [facility] AMB   5/25/2023  7:45 AM Bart Gonzales MD [facility] AMB

## 2022-08-29 ENCOUNTER — HOSPITAL ENCOUNTER (OUTPATIENT)
Dept: PHYSICAL THERAPY | Age: 77
Setting detail: RECURRING SERIES
Discharge: HOME | End: 2022-09-01
Payer: MEDICARE

## 2022-08-29 PROCEDURE — 97110 THERAPEUTIC EXERCISES: CPT

## 2022-08-29 PROCEDURE — 97530 THERAPEUTIC ACTIVITIES: CPT

## 2022-08-29 NOTE — PROGRESS NOTES
8 lbs  Walking marches x40 feet  Fast walk, 40 feet  Time: 9:20       MANUAL THERAPY: (0 minutes):   Joint mobilization, Soft tissue mobilization, and Manipulation was utilized and necessary because of the patient's restricted joint motion, painful spasm, loss of articular motion, and restricted motion of soft tissue. Date  8/29/2022      Technique Used Grade Level # Time(s) Effect while being performed                                                                                         HEP Log Date    see 8/3/22    2.     3.    4.     5.        POC    Recertification Expiration Date      Plan of Care/Certification Expiration Date: 09/28/22     Visit Count  8    Number of Allowed Visits          Treatment/Session Summary:    Treatment Assessment: Increased cardiovascular demand of exercises today by performing circuit. Pt more challenged with balance when fatigued. Also began working on floor transfers today and pt was able to complete with elevated surface. Communication/Consultation:  faxed initial evaluation to referring provider  Equipment provided today:  HEP handout  Recommendations/Intent for next treatment session: Next visit will focus on hip strengthening, floor transfers.     Total Treatment Billable Duration:  43 minutes  Time In: 5216  Time Out: 9672    Dunne Stands, PT       Charge Capture  }Post Session Pain  PT Visit [address] [facility] Portal  MD Guidelines  Scanned Media  Benefits  MyChart    Future Appointments   Date Time Provider Jose Lamar   8/31/2022  9:45 AM Dunne Stands, PT [facility]   9/7/2022  9:45 AM Dunne Stands, PT [facility]   9/8/2022 10:30 AM Dunne Stands, PT [facility]   9/12/2022  9:45 AM Dunne Stands, PT [facility]   9/14/2022  9:45 AM Dunne Stands, PT [facility]   9/19/2022  9:45 AM Dunne Stands, PT [facility]   9/21/2022  9:45 AM Dunne Stands, PT [facility]   9/26/2022  9:45 AM Dunne Stands, PT [facility]   9/28/2022  9:45 AM Dunne Stands, PT [facility]   11/25/2022  8:30 AM Sidney Evans MD [facility] AMB   5/25/2023  7:45 AM Sidney Evans MD [facility] AMB

## 2022-08-31 ENCOUNTER — HOSPITAL ENCOUNTER (OUTPATIENT)
Dept: PHYSICAL THERAPY | Age: 77
Setting detail: RECURRING SERIES
Discharge: HOME | End: 2022-09-03
Payer: MEDICARE

## 2022-08-31 PROCEDURE — 97110 THERAPEUTIC EXERCISES: CPT

## 2022-08-31 PROCEDURE — 97530 THERAPEUTIC ACTIVITIES: CPT

## 2022-08-31 NOTE — PROGRESS NOTES
Rodo Ocampo  : 1945  Primary: Medicare Part A And B  Secondary: [address] @ [address] [facility] [address]  Phone: [phone]  Fax: [phone] Plan Frequency: 2x/wk    Plan of Care/Certification Expiration Date: 22      PT Visit Info:  Progress Note Counter: 1  No Show: 0  Canceled Appointment: 0     Visit Count:  9   OUTPATIENT PHYSICAL THERAPY:OP NOTE TYPE: Treatment Note 2022       Episode  }Appt Desk             ICD-10: Treatment Diagnosis: Muscle Weakness, Generalized (M62.81)  Pain in right hip (M25.551)  Medical/Referring Diagnosis:  Presence of right artificial hip joint [Q98.970]  Referring Physician:  Cooper Vogt MD MD Orders:  PT Eval and Treat, WBAT   Date of Onset:  Onset Date: 22     Allergies:   Patient has no known allergies. Restrictions/Precautions:  No data recordedNo data recorded   Interventions Planned (Treatment may consist of any combination of the following):    Current Treatment Recommendations: Strengthening; ROM; Balance training; Functional mobility training; Transfer training; Stair training; Neuromuscular re-education; Manual Therapy - Soft Tissue Mobilization; Manual Therapy - Joint Manipulation; Pain management; Integrated dry needling; Therapeutic activities; Home exercise program     Subjective Comments: Has some quad soreness from last session. Initial:}     0/10Post Session:        0/10  Medications Last Reviewed:  2022  Updated Objective Findings:  See Initial Evaluation dated 8/3/22 for details  Treatment     THERAPEUTIC EXERCISE: (15 minutes):    Exercises per grid below to improve mobility, strength, balance, and coordination. Progressed resistance and repetitions as indicated.      Date:  22 Date  8/15/22 Date  22 Date  22 Date  22 Date  22 Date  22   Activity/Exercise Parameters         education course (uneven surfaces and levels, step over, cone weaving)   Circuit       Modified floor transfer, EOB  Hector's carry, 8 lbs  Walking marches x40 feet  Fast walk, 40 feet  Time: 9:20        MANUAL THERAPY: (0 minutes):   Joint mobilization, Soft tissue mobilization, and Manipulation was utilized and necessary because of the patient's restricted joint motion, painful spasm, loss of articular motion, and restricted motion of soft tissue. Date  8/31/2022      Technique Used Grade Level # Time(s) Effect while being performed                                                                                         HEP Log Date    see 8/3/22    2.     3.    4.     5.        POC    Recertification Expiration Date      Plan of Care/Certification Expiration Date: 09/28/22     Visit Count  9    Number of Allowed Visits          Treatment/Session Summary:    Treatment Assessment: Attempted to progress floor transfers today for fall recovery but pt reported feeling too sore, so planning to work on it next week. Introduced additional balance challenges with carries today with uneven and compliant surfaces, as pt frequently goes out in the yard and likes to go apple picking with her family. Pt experienced 1 loss of balance that only required Shawn to correct (pt failed to accommodate to level change of compliant surface and tripped, possibly to lack of scanning obstacles ahead). Pt would continue to benefit from incorporating additional balance challenges. Communication/Consultation:  faxed initial evaluation to referring provider  Equipment provided today:  HEP handout  Recommendations/Intent for next treatment session: Next visit will focus on hip strengthening, floor transfers. Progress note next session.      Total Treatment Billable Duration:  39 minutes  Time In: 0945  Time Out: 1024    Surya Mayen PT       Charge Capture  }Post Session Pain  PT Visit Info  [employer] Portal  MD Guidelines  Scanned Media Benefits  MyChart    Future Appointments   Date Time Provider Jose Lamar   9/7/2022  9:45 AM Ethelda Query, PT [facility]   9/12/2022  9:45 AM Ethelda Query, PT [facility]   9/14/2022  9:45 AM Ethelda Query, PT [facility] [place] [facility]   9/19/2022  9:45 AM Ethelda Query, PT [facility] [place] [facility]   9/21/2022  9:45 AM Ethelda Query, PT [facility]   9/26/2022  9:45 AM Ethelda Query, PT [facility] [place] [facility]   9/28/2022  9:45 AM Ethelda Query, PT [facility]   11/25/2022  8:30 AM Stephen Nielson MD [facility] AM[facility]   5/25/2023  7:45 AM Stephen Nielson MD [facility] AMB

## 2022-09-07 ENCOUNTER — HOSPITAL ENCOUNTER (OUTPATIENT)
Dept: PHYSICAL THERAPY | Age: 77
Setting detail: RECURRING SERIES
Discharge: HOME | End: 2022-09-10
Payer: MEDICARE

## 2022-09-07 PROCEDURE — 97110 THERAPEUTIC EXERCISES: CPT

## 2022-09-07 NOTE — PROGRESS NOTES
Mukul Seals  : 1945  Primary: Medicare Part A And B  Secondary: [address] @ [address] [facility] [address]  Phone: [phone]  Fax: [phone] Plan Frequency: 2x/wk    Plan of Care/Certification Expiration Date: 22      PT Visit Info:    Progress Note Counter: 1  No Show: 0  Canceled Appointment: 0      Visit Count:  10    OUTPATIENT PHYSICAL THERAPY:OP NOTE TYPE: Progress Report 2022               Episode  Appt Desk         ICD-10: Treatment Diagnosis: Muscle Weakness, Generalized (M62.81)  Pain in right hip (M25.551)    Medical/Referring Diagnosis:  Presence of right artificial hip joint [O94.024]  Referring Physician:  Tato Edmondson MD MD Orders:  PT Eval and Treat, WBAT  Return MD Appt:  TBD  Date of Onset:  Onset Date: 22     Allergies:  Patient has no known allergies. Restrictions/Precautions:    noneNo data recordedNo data recorded   Medications Last Reviewed:  2022     SUBJECTIVE   History of Injury/Illness (Reason for Referral):  Pt states that she tripped over a rug in her house in March of this year and fell and broke her femur around her prosthesis from a KENNEDY. She was NWB for a couple of weeks on a walker and has been gradually increasing WB since then. She's now using a cane. She worked with [email or web address] [address] prior to coming to outpatient and she was really working on her walking. She notes she still has a \"limp\" when walking without her cane, and her goal is to get rid of her limp. She states pain has been minimal recently and she's primarily concerned with the weakness in her RLE. Progress 22: Still noticing a limp when walking for long distances or if she's done a lot of activity earlier in the day. Pain's not really a big concern. Still feels slower doing her usual activities, particularly walking.      Patient Stated Goal(s):  \"walk without limp\"  Initial:      0/10 Post Session:      0/10  Past Medical History/Comorbidities:   Ms. Kya Booth  has a past medical history of Agatston coronary artery calcium score less than 100, Allergic rhinitis, Arthritis, Colon polyps, H/O cardiovascular stress test, H/O mammogram, Hematuria, HTN (hypertension), Hypercholesterolemia, Hypothyroidism, Otitis externa, Otitis media, Pap smear for cervical cancer screening, Thyroid disease, and URI (upper respiratory infection). Ms. Kya Booth  has a past surgical history that includes Colonoscopy (1/2015) and Total hip arthroplasty (Right, 09/05/2019). Social History/Living Environment:   No data recorded   Prior Level of Function/Work/Activity:   Prior level of function: Independent  Current level of function: difficulty with ambulation  Occupation: Retired  No data [address] recorded   Learning:   Does the patient/guardian have any barriers to learning?: No barriers  Will there be a co-learner?: No  What is the preferred language of the patient/guardian?: English  Is an  required?: No  How does the patient/guardian prefer to learn new concepts?: Listening; Demonstration; Pictures/Videos     Fall Risk Scale: Total Score: 65  Ball Fall Risk: High (45 and higher)           OBJECTIVE         Functional Mobility  [] This section not tested    Test Result   Timed up and go  7.7 sec without SPC   30 second sit to stand  13 reps, no UE assist   6 minute walk test  1294 feet, no SPC   Single Leg Balance  NT         ASSESSMENT   Initial Assessment:  Demonstrates improved functional strength, gait mechanics, walking speed, and self-reported improvement in functional abilities. Pt still limited in walking distance in 6 minutes compared to peers and would benefit from increased focus on cardiovascularly demanding exercises. Norberto Perez will benefit from skilled PT (medically necessary) in order to address above deficits affecting participation in basic ADLs and overall functional tolerance. Problem List: (Impacting functional limitations): Body Structures, Functions, Activity Limitations Requiring Skilled Therapeutic Intervention: Decreased functional mobility ; Decreased ROM; Decreased strength; Decreased endurance; Decreased balance; Increased pain     Therapy Prognosis:   Therapy Prognosis: Good     Assessment Complexity:      PLAN   Effective Dates: 9/8/2022   TO Plan of Care/Certification Expiration Date: 09/28/22     Frequency/Duration: Plan Frequency: 2x/wk     Interventions Planned (Treatment may consist of any combination of the following):    Current Treatment Recommendations: Strengthening; ROM; Balance training; Functional mobility training; Transfer training; Stair training; Neuromuscular re-education; Manual Therapy - Soft Tissue Mobilization; Manual Therapy - Joint Manipulation; Pain management; Integrated dry needling; Therapeutic activities; Home exercise program     Goals: (Goals have been discussed and agreed upon with patient.)  Short-Term Functional Goals: Time Frame: 4 weeks  Pt will demonstrate at least 4-/5 R hip ABD strength to improve gait mechanics. Pt will perform R SLS for at least 20 seconds to demonstrate improved balance and decrease falls risk. Pt will perform TUG in <= 9 seconds to demonstrate improved functional mobility and decreased falls risk. (Met)  Discharge Goals: Time Frame: 8 weeks  Pt will improve HOOS Jr to >=85% to demonstrate improved functional abilities. (Met)  Pt will perform at least 14 STS in 30 seconds without UE assist to demonstrate improved functional strength. (Progressing)  Pt will improve self-selected walking speed to at least 3.8 ft/sec without SPC to demonstrate improved gait ability and speed. (Progressing)         Outcome Measure: Tool Used:  Hip dysfunction and Osteoarthritis Outcome Score for Joint Replacement (HOOS, JR)  Score:  Initial: 5 (Interval: 73.472) 8/3/2022 Most Recent: 1 (Interval: 92.34) Date: 9/7/22 Interpretation of Score: The HOOS, JR contains 6 items from the original HOOS survey. Items are coded from 0 to 4, none to extreme respectively. Mayford Fees is scored by summing the raw response (range 0-24) and then converting it to an interval score using the table provided below. The interval score ranges from 0 to 100 where 0 represents total hip disability and 100 represents perfect hip health. Tool Used: Gait Speed   Score:  Initial Self Selected Walking Speed:  3.35 ft/sec    With SPC Most Recent: 3.6 ft/sec (Date: 9/8/22 ) no SPC      Initial Max Walking Speed: 5.5 ft/sec    With [facility] Most Recent: 4.2 ft/sec (Date: 9/8/22) no SPC   Interpretation of Score: Walking speed is used for assessing and monitoring functional status and over all health on an individual. The individual walks for 5 meters/16.4 ft with the therapist timing. The individual has an acceleration phase of 3 meters, or 9.8ft, prior to timing is initiated. A community ambulator walks at 2.62 ft/sec to 4.32 ft/sec. The [facility] for a community dwelling older adult is a change of 0.45 ft/sec at a self selected pace. A MDC is not yet established for a community dwelling older adult for max walking speed. Medical Necessity:   Skilled intervention continues to be required due to current impairments. Reason For Services/Other Comments:  Patient continues to require skilled intervention due to patient continues to present with impairments assessed at initial evaluation and requiring skilled physical therapy to meet goals for PT. Total Duration:  Time In: 4763  Time Out: 1464    Regarding Burton Mooney's therapy, I certify that the treatment plan above will be carried out by a therapist or under their direction.   Thank you for this referral,  Tereso Monahan, PT     Referring Physician Signature: Horace Block MD                    Post Session Pain  Charge Capture  PT Visit Info MD Guidelines  MyChart

## 2022-09-07 NOTE — PROGRESS NOTES
Navneet Logan  : 1945  Primary: Medicare Part A And B  Secondary: [address] @ [address] [place] [address]  Phone: [phone]  Fax: [phone] Plan Frequency: 2x/wk    Plan of Care/Certification Expiration Date: 22      PT Visit Info:  Progress Note Counter: 1  No Show: 0  Canceled Appointment: 0     Visit Count:  10   OUTPATIENT PHYSICAL THERAPY:OP NOTE TYPE: Treatment Note 2022       Episode  }Appt Desk             ICD-10: Treatment Diagnosis: Muscle Weakness, Generalized (M62.81)  Pain in right hip (M25.551)  Medical/Referring Diagnosis:  Presence of right artificial hip joint [D37.842]  Referring Physician:  Jacques Cadena MD MD Orders:  PT Eval and Treat, WBAT   Date of Onset:  Onset Date: 22     Allergies:   Patient has no known allergies. Restrictions/Precautions:  No data recordedNo data recorded   Interventions Planned (Treatment may consist of any combination of the following):    Current Treatment Recommendations: Strengthening; ROM; Balance training; Functional mobility training; Transfer training; Stair training; Neuromuscular re-education; Manual Therapy - Soft Tissue Mobilization; Manual Therapy - Joint Manipulation; Pain management; Integrated dry needling; Therapeutic activities; Home exercise program     Subjective Comments: See Progress Report dated 22 for details      Initial:}     0/10Post Session:        0/10  Medications Last Reviewed:  2022  Updated Objective Findings:  See Progress Report dated 22 for details  Treatment     THERAPEUTIC EXERCISE: (26 minutes):    Exercises per grid below to improve mobility, strength, balance, and coordination. Progressed resistance and repetitions as indicated.      Date:  22 Date  8/15/22 Date  22 Date  22 Date  22 Date  22 Date  22 Date  22   Activity/Exercise Parameters          education Updated measurements, progress so far, POC   sidestepping 2x40 feet, yellow band + monster walks 2x40 feet, red  band + monster walks 2x40 feet, red  band + monster walks 2x40 feet, red  band + monster walks 2x40 feet, red  band + monster walks  2x40 feet, old purple  band + monster walks    Standing marches 3x10, yellow band  (Hip circles) 3x10, against wall (Hip circles) 3x10, against wall, light green band       Standing hip abd           clam           Hip hikes 3x10          Nustep 8 min, level 5, 553 steps 8 min, level 5, 527 steps 8 min, level 5, 600 steps 8 min, level 5, 576 steps 8 min, level 5, 602 steps 8 min, level 5, 574 steps 8 min, level 5 634 steps 8 min, level 5, 645   UE assisted squat       (For warm up) 2x10                                                               THERAPEUTIC ACTIVITY: ( 15 minutes): Therapeutic activities per grid below to improve mobility, strength, coordination, and dynamic movement of right hip to improve functional lifting, carrying, reaching, catching, and overhead activities.    Date:  8/10/22 Date:  8/12/22 Date:  8/15/22 Date  8/17/22 Date  8/22/22 Date  8/24/22 Date  8/29/22 Date  8/31/22 Date  9/7/22   Activity/Exercise Parameters Parameters Parameters         STS 3x10, yellow band at knees 3x10, yellow band at knees 3x10, chair height no band 3x10x8 lbs, chair height no band  [id number] lbs, chair height no band 3x10, 16 inch box 2x10, 16 inch box    KB deadlift Next session      [id number] lbs, RPE 7/10, to corner of 6 inch box     Sled push/pull 3x40 feet, 50 lbs 3x40 feet, 50 lbs 3x40 feet, 60 lbs 2x40 feet, 75 lbs 3x40 feet, 75 lbs 3x40 feet, 75 lbs 4x40 feet, 75 lbs 4x40 feet, 75 lbs 4x40 feet, 75 lbs   Walking marches  2x40 feet, with pause 2x40 feet, with pause 2x40 feet, with pause 2x40 feet, with pause       Step up   3x10, RLE, 4 inch 3x10, RLE, 4 inch 3x10x5 lbs, RLE, 4 inch       Lateral step up and over   X10, 4 inch X10, 4 inch        Cross over step ups     2x10, 4 inch 2x10, 6 inch      Farmer's carry      3 large laps, 8 lbs  3 large laps, 8 lbs, with obstacle course (uneven surfaces and levels, step over, cone weaving) 3 large laps, 10 lbs   Circuit       Modified floor transfer, EOB  Young's carry, 8 lbs  Walking marches x40 feet  Fast walk, 40 feet  Time: 9:20         MANUAL THERAPY: (0 minutes):   Joint mobilization, Soft tissue mobilization, and Manipulation was utilized and necessary because of the patient's restricted joint motion, painful spasm, loss of articular motion, and restricted motion of soft tissue. Date  9/7/2022      Technique Used Grade Level # Time(s) Effect while being performed                                                                                         HEP Log Date    see 8/3/22    2.     3.    4.     5.        POC    Recertification Expiration Date      Plan of Care/Certification Expiration Date: 09/28/22     Visit Count  10    Number of Allowed Visits          Treatment/Session Summary:    Treatment Assessment: See Progress Report dated 9/7/22 for details     Communication/Consultation:  faxed initial evaluation to referring provider  Equipment provided today:  HEP handout  Recommendations/Intent for next treatment session: Next visit will focus on hip strengthening, floor transfers. Progress note next session.      Total Treatment Billable Duration:  41 minutes  Time In: 9032  Time Out: [address], PT       Charge Capture  }Post Session Pain  PT Visit [facility] Portal  MD Guidelines  Scanned Media  Benefits  MyChart    Future Appointments   Date Time Provider Jose Lamar   9/12/2022  9:45 AM Christiano Antony PT [facility]   9/14/2022  9:45 AM [facility]   9/19/2022  9:45 AM [facility]   9/21/2022  9:45 AM [facility] Anton[facility]   9/26/2022  9:45 AM Christiano Antony PT [facility]   9/28/2022  9:45 AM Christiano Antony, PT [facility]   11/25/2022 8:30 AM Garrett Tellez MD [facility]   5/25/2023  7:45 AM Garrett Tellez MD [facility]

## 2022-09-08 ENCOUNTER — APPOINTMENT (OUTPATIENT)
Dept: PHYSICAL THERAPY | Age: 77
End: 2022-09-08
Payer: MEDICARE

## 2022-09-12 ENCOUNTER — HOSPITAL ENCOUNTER (OUTPATIENT)
Dept: PHYSICAL THERAPY | Age: 77
Setting detail: RECURRING SERIES
Discharge: HOME | End: 2022-09-15
Payer: MEDICARE

## 2022-09-12 PROCEDURE — 97110 THERAPEUTIC EXERCISES: CPT

## 2022-09-12 PROCEDURE — 97530 THERAPEUTIC ACTIVITIES: CPT

## 2022-09-12 NOTE — PROGRESS NOTES
Brenda Cole  : 1945  Primary: Medicare Part A And B  Secondary: [address] [facility] [address]  Phone: [phone]  Fax: [phone] Plan Frequency: 2x/wk    Plan of Care/Certification Expiration Date: 22      PT Visit Info:  Progress Note Counter: 1  No Show: 0  Canceled Appointment: 0     Visit Count:  11   OUTPATIENT PHYSICAL THERAPY:OP NOTE TYPE: Treatment Note 2022       Episode  }Appt Desk             ICD-10: Treatment Diagnosis: Muscle Weakness, Generalized (M62.81)  Pain in right hip (M25.551)  Medical/Referring Diagnosis:  Presence of right artificial hip joint [O33.635]  Referring Physician:  Brandon Richard MD MD Orders:  PT Eval and Treat, WBAT   Date of Onset:  Onset Date: 22     Allergies:   Patient has no known allergies. Restrictions/Precautions:  No data recordedNo data recorded   Interventions Planned (Treatment may consist of any combination of the following):    Current Treatment Recommendations: Strengthening; ROM; Balance training; Functional mobility training; Transfer training; Stair training; Neuromuscular re-education; Manual Therapy - Soft Tissue Mobilization; Manual Therapy - Joint Manipulation; Pain management; Integrated dry needling; Therapeutic activities; Home exercise program     Subjective Comments: No new complaints. Initial:}     0/10Post Session:        0/10  Medications Last Reviewed:  2022  Updated Objective Findings:  See Progress Report dated 22 for details  Treatment     THERAPEUTIC EXERCISE: (15 minutes):    Exercises per grid below to improve mobility, strength, balance, and coordination. Progressed resistance and repetitions as indicated.      Date  8/15/22 Date  22 Date  22 Date  22 Date  22 Date  22 Date  22 Date  22   Activity/Exercise           education       Updated measurements, progress so far, POC    sidestepping 2x40 feet, red  band + monster walks 2x40 feet, red  band + monster walks 2x40 feet, red  band + monster walks 2x40 feet, red  band + monster walks  2x40 feet, old purple  band + monster walks  2x40 feet, old purple  band + monster walks   Standing marches  (Hip circles) 3x10, against wall (Hip circles) 3x10, against wall, light green band        Standing hip abd           clam           Hip hikes           Nustep 8 min, level 5, 527 steps 8 min, level 5, 600 steps 8 min, level 5, 576 steps 8 min, level 5, 602 steps 8 min, level 5, 574 steps 8 min, level 5 634 steps 8 min, level 5, 645 8 min, level 5, 645   UE assisted squat      (For warm up) 2x10                                                                THERAPEUTIC ACTIVITY: ( 30 minutes): Therapeutic activities per grid below to improve mobility, strength, coordination, and dynamic movement of right hip to improve functional lifting, carrying, reaching, catching, and overhead activities.    Date  8/17/22 Date  8/22/22 Date  8/24/22 Date  8/29/22 Date  8/31/22 Date  9/7/22 Date  9/12/22   Activity/Exercise          STS 3x10x8 lbs, chair height no band  [id number] lbs, chair height no band 3x10, 16 inch box 2x10, 16 inch box     KB deadlift    [id number] lbs, RPE 7/10, to corner of 6 inch box      Sled push/pull 2x40 feet, 75 lbs 3x40 feet, 75 lbs 3x40 feet, 75 lbs 4x40 feet, 75 lbs 4x40 feet, 75 lbs 4x40 feet, 75 lbs 3x40 feet, 85 lbs   Walking marches 2x40 feet, with pause 2x40 feet, with pause        Step up 3x10, RLE, 4 inch 3x10x5 lbs, RLE, 4 inch        Lateral step up and over X10, 4 inch         Cross over step ups  2x10, 4 inch 2x10, 6 inch       Farmer's carry   3 large laps, 8 lbs  3 large laps, 8 lbs, with obstacle course (uneven surfaces and levels, step over, cone weaving) 3 large laps, 10 lbs    Floor transfers       X4 from floor with single UE assist, + warm ups from higher surfaces   Circuit    Modified floor transfer, EOB  Young's carry, 8 lbs  Walking marches x40 feet  Fast walk, 40 feet  Time: 9:20   STS x10, 16 inch  KB DL, x10x10 lbs  Fast walk x30 feet to  10 lbs DB and vice versa    X3  TIME: 5:34, 6/10 RPE       MANUAL THERAPY: (0 minutes):   Joint mobilization, Soft tissue mobilization, and Manipulation was utilized and necessary because of the patient's restricted joint motion, painful spasm, loss of articular motion, and restricted motion of soft tissue. Date  9/12/2022      Technique Used Grade Level # Time(s) Effect while being performed                                                                                         HEP Log Date    see 8/3/22    2.     3.    4.     5.        POC    Recertification Expiration Date      Plan of Care/Certification Expiration Date: 09/28/22     Visit Count  11    Number of Allowed Visits          Treatment/Session Summary:    Treatment Assessment: Incorporated new circuit today to further challenge endurance. Pt fatigued but able to complete without rest breaks. Also worked on floor transfers today. Tried to progress to no external supports but pt required single UE assist. Pt would continue to benefit from progression of resistance circuits to improve walking endurance to age norms. Communication/Consultation:  faxed initial evaluation to referring provider  Equipment provided today:  HEP handout  Recommendations/Intent for next treatment session: Next visit will focus on hip strengthening, floor transfers. Progress note next session.      Total Treatment Billable Duration:  45 minutes  Time In: 1240  Time Out: [address], PT       Charge Capture  }Post Session Pain  PT Visit [address] [facility] Portal  MD Guidelines  Scanned Media  Benefits  MyChart    Future Appointments   Date Time Provider Jose Lamar   9/14/2022  9:45 AM Surya Mayen PT [facility]   9/19/2022  9:45 AM Surya Mayen PT [facility]   9/21/2022  9:45 AM Surya Mayen PT [facility]   9/26/2022  9:45 AM Pam Pérez, PT [facility]   9/28/2022  9:45 AM Pam Pérez, PT [facility]   11/25/2022  8:30 AM MD NEYMAR French AMB   5/25/2023  7:45 AM MD NEYMAR French [facility]

## 2022-09-14 ENCOUNTER — HOSPITAL ENCOUNTER (OUTPATIENT)
Dept: PHYSICAL THERAPY | Age: 77
Setting detail: RECURRING SERIES
Discharge: HOME | End: 2022-09-17
Payer: MEDICARE

## 2022-09-14 PROCEDURE — 97110 THERAPEUTIC EXERCISES: CPT

## 2022-09-14 PROCEDURE — 97530 THERAPEUTIC ACTIVITIES: CPT

## 2022-09-14 NOTE — PROGRESS NOTES
far, POC     sidestepping 2x40 feet, red  band + monster walks 2x40 feet, red  band + monster walks 2x40 feet, red  band + monster walks  2x40 feet, old purple  band + monster walks  2x40 feet, old purple  band + monster walks 2x40 feet, new purple  band + monster walks   Standing marches (Hip circles) 3x10, against wall (Hip circles) 3x10, against wall, light green band         Standing hip abd           clam           Hip hikes           Nustep 8 min, level 5, 600 steps 8 min, level 5, 576 steps 8 min, level 5, 602 steps 8 min, level 5, 574 steps 8 min, level 5 634 steps 8 min, level 5, 645 8 min, level 5, 645 (Bike) 8 min   UE assisted squat     (For warm up) 2x10                                                                 THERAPEUTIC ACTIVITY: ( 37 minutes): Therapeutic activities per grid below to improve mobility, strength, coordination, and dynamic movement of right hip to improve functional lifting, carrying, reaching, catching, and overhead activities.    Date  8/22/22 Date  8/24/22 Date  8/29/22 Date  8/31/22 Date  9/7/22 Date  9/12/22 Date  9/14/22   Activity/Exercise          STS  [id number] lbs, chair height no band 3x10, 16 inch box 2x10, 16 inch box      KB deadlift   [id number] lbs, RPE 7/10, to corner of 6 inch box       Sled push/pull 3x40 feet, 75 lbs 3x40 feet, 75 lbs 4x40 feet, 75 lbs 4x40 feet, 75 lbs 4x40 feet, 75 lbs 3x40 feet, 85 lbs 3x40 feet, 85 lbs   Walking marches 2x40 feet, with pause         Step up 3x10x5 lbs, RLE, 4 inch         Lateral step up and over          Cross over step ups 2x10, 4 inch 2x10, 6 inch        Farmer's carry  3 large laps, 8 lbs  3 large laps, 8 lbs, with obstacle course (uneven surfaces and levels, step over, cone weaving) 3 large laps, 10 lbs  4 large laps, 10 lbs   Floor transfers      X4 from floor with single UE assist, + warm ups from higher surfaces    Circuit   Modified floor transfer, EOB  Young's carry, 8 lbs  Walking aviva x40 feet  Fast walk, 40 feet  Time: 9:20   STS x10, 16 inch  KB DL, x10x10 lbs  Fast walk x30 feet to  10 lbs DB and vice versa    X3  TIME: 5:34, 6/10 RPE STS x10, 5 lbs OH press  Walking marches x40 feet  Fast walk    X3  TIME: 6:59 min  7-8/10 RPE       MANUAL THERAPY: (0 minutes):   Joint mobilization, Soft tissue mobilization, and Manipulation was utilized and necessary because of the patient's restricted joint motion, painful spasm, loss of articular motion, and restricted motion of soft tissue. Date  9/14/2022      Technique Used Grade Level # Time(s) Effect while being performed                                                                                         HEP Log Date    see 8/3/22    2.     3.    4.     5.        POC    Recertification Expiration Date      Plan of Care/Certification Expiration Date: 09/28/22     Visit Count  12    Number of Allowed Visits          Treatment/Session Summary:    Treatment Assessment: Continued with cardiovascularly demanding circuits, while incorporating balance, strength, and walking. Pt appropriately challenged per RPE. Pt would continue to benefit from progressive exercise to improve walking endurance and hip strength to improve gait mechanics. Communication/Consultation:  faxed initial evaluation to referring provider  Equipment provided today:  HEP handout  Recommendations/Intent for next treatment session: Next visit will focus on hip strengthening, floor transfers. Progress note next session.      Total Treatment Billable Duration:  42 minutes  Time In: 4931  Time Out: Diallo Mendes 54, PT       Charge Capture  }Post Session Pain  PT Visit [address] Portal  MD Guidelines  Scanned Media  Benefits  MyChart    Future Appointments   Date Time Provider Jose Lamar   9/19/2022  9:45 AM Surya Mayen PT [facility] AND [facility]   9/21/2022  9:45 AM Surya Mayen PT [facility] AND [facility]   9/26/2022  9:45 AM Surya Mayen PT [facility]   9/28/2022  9:45 AM Surya Mayen, PT [facility]   11/25/2022  8:30 AM Angeles Sánchez MD [facility] AMB   5/25/2023  7:45 AM Angeles Sánchez MD [facility] AMB

## 2022-09-19 ENCOUNTER — HOSPITAL ENCOUNTER (OUTPATIENT)
Dept: PHYSICAL THERAPY | Age: 77
Setting detail: RECURRING SERIES
Discharge: HOME | End: 2022-09-22
Payer: MEDICARE

## 2022-09-19 PROCEDURE — 97110 THERAPEUTIC EXERCISES: CPT

## 2022-09-19 PROCEDURE — 97530 THERAPEUTIC ACTIVITIES: CPT

## 2022-09-19 NOTE — PROGRESS NOTES
Brenda Cole  : 1945  Primary: Medicare Part A And B  Secondary: [address] [facility] [address]  Phone: [phone]  Fax: [phone] Plan Frequency: 2x/wk    Plan of Care/Certification Expiration Date: 22      PT Visit Info:  Progress Note Counter: 1  No Show: 0  Canceled Appointment: 0     Visit Count:  13   OUTPATIENT PHYSICAL THERAPY:OP NOTE TYPE: Treatment Note 2022       Episode  }Appt Desk             ICD-10: Treatment Diagnosis: Muscle Weakness, Generalized (M62.81)  Pain in right hip (M25.551)  Medical/Referring Diagnosis:  Presence of right artificial hip joint [L41.037]  Referring Physician:  Brandon Richard MD MD Orders:  PT Eval and Treat, WBAT   Date of Onset:  Onset Date: 22     Allergies:   Patient has no known allergies. Restrictions/Precautions:  No data recordedNo data recorded   Interventions Planned (Treatment may consist of any combination of the following):    Current Treatment Recommendations: Strengthening; ROM; Balance training; Functional mobility training; Transfer training; Stair training; Neuromuscular re-education; Manual Therapy - Soft Tissue Mobilization; Manual Therapy - Joint Manipulation; Pain management; Integrated dry needling; Therapeutic activities; Home exercise program     Subjective Comments: Hips were a little sore after last session. Initial:}     0/10Post Session:        0/10  Medications Last Reviewed:  2022  Updated Objective Findings:  See Progress Report dated 22 for details  Treatment     THERAPEUTIC EXERCISE: (15 minutes):    Exercises per grid below to improve mobility, strength, balance, and coordination. Progressed resistance and repetitions as indicated.      Date  22 Date  22 Date  22 Date  22 Date  22 Date  22 Date  22 Date  22   Activity/Exercise           education     Updated measurements, progress so far, POC      sidestepping 2x40 feet, red  band + monster walks 2x40 feet, red  band + monster walks  2x40 feet, old purple  band + monster walks  2x40 feet, old purple  band + monster walks 2x40 feet, new purple  band + monster walks 2x40 feet, new purple  band + monster walks   Standing marches (Hip circles) 3x10, against wall, light green band          Standing hip abd           clam           Hip hikes           Nustep 8 min, level 5, 576 steps 8 min, level 5, 602 steps 8 min, level 5, 574 steps 8 min, level 5 634 steps 8 min, level 5, 645 8 min, level 5, 645 (Bike) 8 min 8 min, level 6,  456 steps   UE assisted squat    (For warm up) 2x10                                                                  THERAPEUTIC ACTIVITY: ( 25 minutes): Therapeutic activities per grid below to improve mobility, strength, coordination, and dynamic movement of right hip to improve functional lifting, carrying, reaching, catching, and overhead activities.    Date  8/24/22 Date  8/29/22 Date  8/31/22 Date  9/7/22 Date  9/12/22 Date  9/14/22 Date  9/19/22   Activity/Exercise          STS 2x51y33 lbs, chair height no band 3x10, 16 inch box 2x10, 16 inch box       KB deadlift  [id number] lbs, RPE 7/10, to corner of 6 inch box     [id number] lbs, 4 inch box   Sled push/pull 3x40 feet, 75 lbs 4x40 feet, 75 lbs 4x40 feet, 75 lbs 4x40 feet, 75 lbs 3x40 feet, 85 lbs 3x40 feet, 85 lbs 2x40 feet, 100 lbs   Walking marches          Step up          Lateral step up and over          Cross over step ups 2x10, 6 inch         Farmer's carry 3 large laps, 8 lbs  3 large laps, 8 lbs, with obstacle course (uneven surfaces and levels, step over, cone weaving) 3 large laps, 10 lbs  4 large laps, 10 lbs 3 large laps, 15 lbs   Floor transfers     X4 from floor with single UE assist, + warm ups from higher surfaces     Circuit  Modified floor transfer, EOB  Young's carry, 8 lbs  Walking marches x40 feet  Fast walk, 40 feet  Time: 9:20   STS x10, 16 inch  KB DL, x10x10 lbs  Fast walk x30 feet to  10 lbs DB and vice versa    X3  TIME: 5:34, 6/10 RPE STS x10, 5 lbs OH press  Walking marches x40 feet  Fast walk    X3  TIME: 6:59 min  7-8/10 RPE    Modified split squats       2x10, BUE assist, 3 pads       MANUAL THERAPY: (0 minutes):   Joint mobilization, Soft tissue mobilization, and Manipulation was utilized and necessary because of the patient's restricted joint motion, painful spasm, loss of articular motion, and restricted motion of soft tissue. Date  9/19/2022      Technique Used Grade Level # Time(s) Effect while being performed                                                                                         HEP Log Date    see 8/3/22    2.     3.    4.     5.        POC    Recertification Expiration Date      Plan of Care/Certification Expiration Date: 09/28/22     Visit Count  13    Number of Allowed Visits          Treatment/Session Summary:    Treatment Assessment: Focused more on heavier loads today with increased rest to emphasize strength. Pt appropriately challenged throughout session. Pt still demonstrates trendelenburg gait with fatigue and would continue to benefit from heavier lifting for strength and circuits for aerobic capacity. Communication/Consultation:  faxed initial evaluation to referring provider  Equipment provided today:  HEP handout  Recommendations/Intent for next treatment session: Next visit will focus on hip strengthening, floor transfers. Progress note next session.      Total Treatment Billable Duration:  40 minutes  Time In: 7526  Time Out: [address], PT       Charge Capture  }Post Session Pain  PT Visit [address] [facility] Portal  MD Guidelines  Scanned Media  Benefits  MyChart    Future Appointments   Date Time Provider Jose Lamar   9/21/2022  9:45 AM Camryn Hutchison PT [facility] AND [facility]   9/26/2022  9:45 AM Camryn Hutchison PT [facility] AND [facility]   9/28/2022  9:45 AM Nya Lau LAURO [facility]   11/25/2022  8:30 AM MD NEYMAR Weathers [facility]   5/25/2023  7:45 AM Ankit Ibarra MD [facility]

## 2022-09-21 ENCOUNTER — HOSPITAL ENCOUNTER (OUTPATIENT)
Dept: PHYSICAL THERAPY | Age: 77
Setting detail: RECURRING SERIES
Discharge: HOME | End: 2022-09-24
Payer: MEDICARE

## 2022-09-21 PROCEDURE — 97530 THERAPEUTIC ACTIVITIES: CPT

## 2022-09-21 PROCEDURE — 97110 THERAPEUTIC EXERCISES: CPT

## 2022-09-21 NOTE — PROGRESS NOTES
Norberto Perez  : 1945  Primary: Medicare Part A And B  Secondary: [address] @ [address] [facility] [address]  Phone: [phone]  Fax: [phone] Plan Frequency: 2x/wk    Plan of Care/Certification Expiration Date: 22      PT Visit Info:  Progress Note Counter: 1  No Show: 0  Canceled Appointment: 0     Visit Count:  14   OUTPATIENT PHYSICAL THERAPY:OP NOTE TYPE: Treatment Note 2022       Episode  }Appt Desk             ICD-10: Treatment Diagnosis: Muscle Weakness, Generalized (M62.81)  Pain in right hip (M25.551)  Medical/Referring Diagnosis:  Presence of right artificial hip joint [A24.087]  Referring Physician:  Ra Oliva MD MD Orders:  PT Eval and Treat, WBAT   Date of Onset:  Onset Date: 22     Allergies:   Patient has no known allergies. Restrictions/Precautions:  No data recordedNo data recorded   Interventions Planned (Treatment may consist of any combination of the following):    Current Treatment Recommendations: Strengthening; ROM; Balance training; Functional mobility training; Transfer training; Stair training; Neuromuscular re-education; Manual Therapy - Soft Tissue Mobilization; Manual Therapy - Joint Manipulation; Pain management; Integrated dry needling; Therapeutic activities; Home exercise program     Subjective Comments: Wasn't really sore after last session. Went to a grandparent's breakfast at school. Initial:}     0/10Post Session:        0/10  Medications Last Reviewed:  2022  Updated Objective Findings:  1436 feet in 6 minutes outside  Treatment     THERAPEUTIC EXERCISE: (10 minutes):    Exercises per grid below to improve mobility, strength, balance, and coordination. Progressed resistance and repetitions as indicated.      Date  22 Date  22 Date  22 Date  22 Date  22 Date  22 Date  22 Date  22 Date  22 Activity/Exercise            education     Updated measurements, progress so far, POC       sidestepping 2x40 feet, red  band + monster walks 2x40 feet, red  band + monster walks  2x40 feet, old purple  band + monster walks  2x40 feet, old purple  band + monster walks 2x40 feet, new purple  band + monster walks 2x40 feet, new purple  band + monster walks 2x40 feet, new purple  band + monster walks   Standing marches (Hip circles) 3x10, against wall, light green band           Standing hip abd            clam            Hip hikes            Nustep 8 min, level 5, 576 steps 8 min, level 5, 602 steps 8 min, level 5, 574 steps 8 min, level 5 634 steps 8 min, level 5, 645 8 min, level 5, 645 (Bike) 8 min 8 min, level 6,  456 steps    UE assisted squat    (For warm up) 2x10        6 minute walk test         6 min for warm up                                                       THERAPEUTIC ACTIVITY: ( 32 minutes): Therapeutic activities per grid below to improve mobility, strength, coordination, and dynamic movement of right hip to improve functional lifting, carrying, reaching, catching, and overhead activities.    Date  8/24/22 Date  8/29/22 Date  8/31/22 Date  9/7/22 Date  9/12/22 Date  9/14/22 Date  9/19/22 Date  9/21/22   Activity/Exercise           STS [id number] lbs, chair height no band 3x10, 16 inch box 2x10, 16 inch box     2x10, 16 inch box   KB deadlift  [id number] lbs, RPE 7/10, to corner of 6 inch box     6q79x06 lbs, 4 inch box 0z97x93 lbs, 4 inch box   Sled push/pull 3x40 feet, 75 lbs 4x40 feet, 75 lbs 4x40 feet, 75 lbs 4x40 feet, 75 lbs 3x40 feet, 85 lbs 3x40 feet, 85 lbs 2x40 feet, 100 lbs 3x40 feet, 75 lbs   Walking marches           Step up           Lateral step up and over           Cross over step ups 2x10, 6 inch          Farmer's carry 3 large laps, 8 lbs  3 large laps, 8 lbs, with obstacle course (uneven surfaces and levels, step over, cone weaving) 3 large laps, 10 lbs  4 large laps, 10 lbs 3 large laps, 15 lbs    Floor transfers     X4 from floor with single UE assist, + warm ups from higher surfaces      Circuit  Modified floor transfer, EOB  Young's carry, 8 lbs  Walking marches x40 feet  Fast walk, 40 feet  Time: 9:20   STS x10, 16 inch  KB DL, x10x10 lbs  Fast walk x30 feet to  10 lbs DB and vice versa    X3  TIME: 5:34, 6/10 RPE STS x10, 5 lbs OH press  Walking marches x40 feet  Fast walk    X3  TIME: 6:59 min  7-8/10 RPE  Modified floor transfer, EOB x3  Farmer's carry, 8 lbs  Walking marches x40 feet  Fast walk, 1 large lap  x3  Time: 5:58 min   Modified split squats       2x10, BUE assist, 3 pads 2x10, BUE assist, 3 pads       MANUAL THERAPY: (0 minutes):   Joint mobilization, Soft tissue mobilization, and Manipulation was utilized and necessary because of the patient's restricted joint motion, painful spasm, loss of articular motion, and restricted motion of soft tissue. Date  9/21/2022      Technique Used Grade Level # Time(s) Effect while being performed                                                                                         HEP Log Date    see 8/3/22    2.     3.    4.     5.        POC    Recertification Expiration Date      Plan of Care/Certification Expiration Date: 09/28/22     Visit Count  14    Number of Allowed Visits          Treatment/Session Summary:    Treatment Assessment: Pt increased 6 MWT by ~200 feet over the last two weeks, placing her ~100 feet shy of age-related norms for aerobic endurance. Pt also completed previously performed circuit in less time and with no rest breaks, further demonstrating improving aerobic capacity and functional strength. Communication/Consultation:  faxed initial evaluation to referring provider  Equipment provided today:  HEP handout  Recommendations/Intent for next treatment session: Next visit will focus on hip strengthening, floor transfers. Progress note next session.      Total Treatment Billable

## 2022-09-26 ENCOUNTER — HOSPITAL ENCOUNTER (OUTPATIENT)
Dept: PHYSICAL THERAPY | Age: 77
Setting detail: RECURRING SERIES
Discharge: HOME | End: 2022-09-29
Payer: MEDICARE

## 2022-09-26 PROCEDURE — 97110 THERAPEUTIC EXERCISES: CPT

## 2022-09-26 NOTE — PROGRESS NOTES
Wendie Pia  : 1945  Primary: Medicare Part A And B  Secondary: [address] @ [address] [facility] [address]  Phone: [phone]  Fax: [phone] Plan Frequency: 2x/wk    Plan of Care/Certification Expiration Date: 22      PT Visit Info:  Progress Note Counter: 1  No Show: 0  Canceled Appointment: 0     Visit Count:  15   OUTPATIENT PHYSICAL THERAPY:OP NOTE TYPE: Treatment Note 2022       Episode  }Appt Desk             ICD-10: Treatment Diagnosis: Muscle Weakness, Generalized (M62.81)  Pain in right hip (M25.551)  Medical/Referring Diagnosis:  Presence of right artificial hip joint [J68.362]  Referring Physician:  Phil Monge MD MD Orders:  PT Eval and Treat, WBAT   Date of Onset:  Onset Date: 22     Allergies:   Patient has no known allergies. Restrictions/Precautions:  No data recordedNo data recorded   Interventions Planned (Treatment may consist of any combination of the following):    Current Treatment Recommendations: Strengthening; ROM; Balance training; Functional mobility training; Transfer training; Stair training; Neuromuscular re-education; Manual Therapy - Soft Tissue Mobilization; Manual Therapy - Joint Manipulation; Pain management; Integrated dry needling; Therapeutic activities; Home exercise program     Subjective Comments: Doing okay. Back is a little painful today. Initial:}     0/10Post Session:        0/10  Medications Last Reviewed:  2022  Updated Objective Findings:  1436 feet in 6 minutes outside  Treatment     THERAPEUTIC EXERCISE: (12 minutes):    Exercises per grid below to improve mobility, strength, balance, and coordination. Progressed resistance and repetitions as indicated.      Date  22 Date  22 Date  22 Date  22 Date  22 Date  22 Date  22 Date  22   Activity/Exercise           education   Updated measurements, progress so far, POC        sidestepping  2x40 feet, old purple  band + monster walks  2x40 feet, old purple  band + monster walks 2x40 feet, new purple  band + monster walks 2x40 feet, new purple  band + monster walks 2x40 feet, new purple  band + monster walks    Standing marches           Standing hip abd           clam           Hip hikes           Nustep 8 min, level 5, 574 steps 8 min, level 5 634 steps 8 min, level 5, 645 8 min, level 5, 645 (Bike) 8 min 8 min, level 6,  456 steps  8 min sci fit, level 3   UE assisted squat  (For warm up) 2x10         6 minute walk test       6 min for warm up    Lower trunk rotation        2 min   L stretch        2 min                             THERAPEUTIC ACTIVITY: (27 minutes): Therapeutic activities per grid below to improve mobility, strength, coordination, and dynamic movement of right hip to improve functional lifting, carrying, reaching, catching, and overhead activities.    Date  8/31/22 Date  9/7/22 Date  9/12/22 Date  9/14/22 Date  9/19/22 Date  9/21/22 Date  9/26/22   Activity/Exercise          STS 2x10, 16 inch box     2x10, 16 inch box 3x10, 16 inch box   KB deadlift     1v02r15 lbs, 4 inch box 8l88x66 lbs, 4 inch box 4n75z43 lbs, 6 inch box   Sled push/pull 4x40 feet, 75 lbs 4x40 feet, 75 lbs 3x40 feet, 85 lbs 3x40 feet, 85 lbs 2x40 feet, 100 lbs 3x40 feet, 75 lbs 3x40 feet, 100 lbs   Walking marches          Step up          Lateral step up and over          Cross over step ups          Farmer's carry 3 large laps, 8 lbs, with obstacle course (uneven surfaces and levels, step over, cone weaving) 3 large laps, 10 lbs  4 large laps, 10 lbs 3 large laps, 15 lbs  3 large laps, 15 lbs   Floor transfers   X4 from floor with single UE assist, + warm ups from higher surfaces       Circuit   STS x10, 16 inch  KB DL, x10x10 lbs  Fast walk x30 feet to  10 lbs DB and vice versa    X3  TIME: 5:34, 6/10 RPE STS x10, 5 lbs OH press  Walking marches x40 feet  Fast walk    X3  TIME: 6:59 min  7-8/10 RPE  Modified floor transfer, EOB x3  Farmer's carry, 8 lbs  Walking marches x40 feet  Fast walk, 1 large lap  x3  Time: 5:58 min    Modified split squats     2x10, BUE assist, 3 pads 2x10, BUE assist, 3 pads 2x10, BUE assist, 3 pads       MANUAL THERAPY: (0 minutes):   Joint mobilization, Soft tissue mobilization, and Manipulation was utilized and necessary because of the patient's restricted joint motion, painful spasm, loss of articular motion, and restricted motion of soft tissue. Date  9/26/2022      Technique Used Grade Level # Time(s) Effect while being performed                                                                                         HEP Log Date    see 8/3/22    2.     3.    4.     5.        POC    Recertification Expiration Date      Plan of Care/Certification Expiration Date: 09/28/22     Visit Count  15    Number of Allowed Visits          Treatment/Session Summary:    Treatment Assessment: Incorporated lumbar ROM exercises for pain relief. Continued gross LE strengthening exercises today with good tolerance. Able to progress as indicated above. Pt would continue to benefit from 1 more session to ensure independence with HEP and progression/regression of exercises. Communication/Consultation:  faxed initial evaluation to referring provider  Equipment provided today:  HEP handout  Recommendations/Intent for next treatment session: Next visit will focus on hip strengthening, floor transfers. Progress note next session.      Total Treatment Billable Duration:  39 minutes  Time In: 8935  Time Out: [address], PT       Charge Capture  }Post Session Pain  PT Visit [address] [facility] Portal  MD Guidelines  Scanned Media  Benefits  MyChart    Future Appointments   Date Time Provider Jose Lamar   9/28/2022  9:45 AM Mikayla Mancia, PT [facility]   11/25/2022  8:30 AM Анна Alvarez MD [facility]   5/25/2023  7:45 AM Brice Hernandez MD Lyn[place] [facility]

## 2022-09-28 ENCOUNTER — HOSPITAL ENCOUNTER (OUTPATIENT)
Dept: PHYSICAL THERAPY | Age: 77
Setting detail: RECURRING SERIES
Discharge: HOME | End: 2022-10-01
Payer: MEDICARE

## 2022-09-28 PROCEDURE — 97110 THERAPEUTIC EXERCISES: CPT

## 2022-09-28 PROCEDURE — 97530 THERAPEUTIC ACTIVITIES: CPT

## 2022-09-28 NOTE — THERAPY DISCHARGE
Heather Barragan  : 1945  Primary: Medicare Part A And B  Secondary: [address] @ [address] [address]  Phone: [phone]  Fax: [phone] Plan Frequency: 2x/wk    Plan of Care/Certification Expiration Date: 22      PT Visit Info:    Progress Note Counter: 1  No Show: 0  Canceled Appointment: 0      Visit Count:  16    OUTPATIENT PHYSICAL THERAPY:OP NOTE TYPE: Discharge Summary 2022               Episode  Appt Desk         ICD-10: Treatment Diagnosis: Muscle Weakness, Generalized (M62.81)  Pain in right hip (M25.551)    Medical/Referring Diagnosis:  Presence of right artificial hip joint [R48.410]  Referring Physician:  Alvarez Miramontes MD MD Orders:  PT Eval and Treat, WBAT  Return MD Appt:  TBD  Date of Onset:  Onset Date: 22     Allergies:  Patient has no known allergies. Restrictions/Precautions:    noneNo data recordedNo data recorded   Medications Last Reviewed:  2022     SUBJECTIVE   History of Injury/Illness (Reason for Referral):  Pt states that she tripped over a rug in her house in March of this year and fell and broke her femur around her prosthesis from a KENN[employer]Y. She was NWB for a couple of weeks on a walker and has been gradually increasing WB since then. She's now using a cane. She worked with [employer] [address] prior to coming to outpatient and she was really working on her walking. She notes she still has a \"limp\" when walking without her cane, and her goal is to get rid of her limp. She states pain has been minimal recently and she's primarily concerned with the weakness in her RLE. Progress 22: Still noticing a limp when walking for long distances or if she's done a lot of activity earlier in the day. Pain's not really a big concern. Still feels slower doing her usual activities, particularly walking. Discharge 22: Feels like she's doing pretty well.  She only walks with a limp if she's done a lot of activity during the day. Knows that she needs to keep getting stronger but feels able to continue exercises at home. Patient Stated Goal(s):  \"walk without limp\"  Initial:      0/10 Post Session:      0/10  Past Medical History/Comorbidities:   Ms. Venkat Amezcua  has a past medical history of Agatston coronary artery calcium score less than 100, Allergic rhinitis, Arthritis, Colon polyps, H/O cardiovascular stress test, H/O mammogram, Hematuria, HTN (hypertension), Hypercholesterolemia, Hypothyroidism, Otitis externa, Otitis media, Pap smear for cervical cancer screening, Thyroid disease, and URI (upper respiratory infection). Ms. Venkat Amezcua  has a past surgical history that includes Colonoscopy (1/2015) and Total hip arthroplasty (Right, 09/05/2019). Social History/Living Environment:   No data recorded   Prior Level of Function/Work/Activity:   Prior level of function: Independent  Current level of function: difficulty with ambulation  Occupation: Retired  No data [address] recorded   Learning:   Does the patient/guardian have any barriers to learning?: No barriers  Will there be a co-learner?: No  What is the preferred language of the patient/guardian?: English  Is an  required?: No  How does the patient/guardian prefer to learn new concepts?: Listening; Demonstration; Pictures/Videos     Fall Risk Scale:    Ball Total Score: 65  Ball Fall Risk: High (45 and higher)           OBJECTIVE         Functional Mobility  [] This section not tested    Test Result   Timed up and go  7.7 sec without SPC --> 7.0 sec at discharge   30 second sit to stand  13 reps, no UE assist --> 14 reps, no UE assist at discharge   6 minute walk test  1294 feet, no SPC --> 1601 feet, no SPC at discharge   Single Leg Balance  NT         ASSESSMENT   Initial Assessment:  Pt demonstrates significant improvement in functional mobility and strength measures, and reports significant improvement in functional ability. Pt exceeded age-related norms for 6 minute walk test, indicating improved gait ability and endurance. Pt appropriate for discharge at this time with HEP. Problem List: (Impacting functional limitations): Body Structures, Functions, Activity Limitations Requiring Skilled Therapeutic Intervention: Decreased functional mobility ; Decreased ROM; Decreased strength; Decreased endurance; Decreased balance; Increased pain     Therapy Prognosis:   Therapy Prognosis: Good     Assessment Complexity:      PLAN   Pt discharging. Goals: (Goals have been discussed and agreed upon with patient.)    Discharge Goals: Time Frame: 8 weeks  Pt will improve HOOS Jr to >=85% to demonstrate improved functional abilities. (Met)  Pt will perform at least 14 STS in 30 seconds without UE assist to demonstrate improved functional strength. (Met)  Pt will improve self-selected walking speed to at least 3.8 ft/sec without SPC to demonstrate improved gait ability and speed. (Met)         Outcome Measure: Tool Used: Hip dysfunction and Osteoarthritis Outcome Score for Joint Replacement (HOOS, JR)  Score:  Initial: 5 (Interval: 73.472) 8/3/2022 Most Recent: 1 (Interval: 92.34) Date: 9/7/22   Interpretation of Score: The HOOS, JR contains 6 items from the original HOOS survey. Items are coded from 0 to 4, none to extreme respectively. Giovanna Ann is scored by summing the raw response (range 0-24) and then converting it to an interval score using the table provided below. The interval score ranges from 0 to 100 where 0 represents total hip disability and 100 represents perfect hip health.       Tool Used: Gait Speed   Score:  Initial Self Selected Walking Speed:  3.35 ft/sec    With SPC Most Recent: 4.4 ft/sec (Date: 9/28/22 ) no SPC        Initial Max Walking Speed: 5.5 ft/sec    With [address] Most Recent: 5.1 ft/sec (Date: 9/28/22) no SPC     Interpretation of Score: Walking speed is used for assessing and monitoring functional status and over all health on an individual. The individual walks for 5 meters/16.4 ft with the therapist timing. The individual has an acceleration phase of 3 meters, or 9.8ft, prior to timing is initiated. A community ambulator walks at 2.62 ft/sec to 4.32 ft/sec. The [facility] for a community dwelling older adult is a change of 0.45 ft/sec at a self selected pace. A MDC is not yet established for a community dwelling older adult for max walking speed. Medical Necessity:   Skilled intervention continues to be required due to current impairments. Reason For Services/Other Comments:  Patient continues to require skilled intervention due to patient continues to present with impairments assessed at initial evaluation and requiring skilled physical therapy to meet goals for PT. Total Duration:  Time In: 0945  Time Out: 3858    Regarding Edwardo Stoddardters Vinicio's therapy, I certify that the treatment plan above will be carried out by a therapist or under their direction.   Thank you for this referral,  Lamar Ruano, PT     Referring Physician Signature: Vivek Vásquez MD                    Post Session Pain  Charge Capture  PT Visit Info MD Brian Mir

## 2022-09-28 NOTE — PROGRESS NOTES
Eleanor Wise  : 1945  Primary: Medicare Part A And B  Secondary: [address] @ [address] [facility] [address]  Phone: [phone]  Fax: [phone] Plan Frequency: 2x/wk    Plan of Care/Certification Expiration Date: 22      PT Visit Info:  Progress Note Counter: 1  No Show: 0  Canceled Appointment: 0     Visit Count:  16   OUTPATIENT PHYSICAL THERAPY:OP NOTE TYPE: Treatment Note 2022       Episode  }Appt Desk             ICD-10: Treatment Diagnosis: Muscle Weakness, Generalized (M62.81)  Pain in right hip (M25.551)  Medical/Referring Diagnosis:  Presence of right artificial hip joint [K30.595]  Referring Physician:  John Jhaveri MD MD Orders:  PT Eval and Treat, WBAT   Date of Onset:  Onset Date: 22     Allergies:   Patient has no known allergies. Restrictions/Precautions:  No data recordedNo data recorded   Interventions Planned (Treatment may consist of any combination of the following):    Current Treatment Recommendations: Strengthening; ROM; Balance training; Functional mobility training; Transfer training; Stair training; Neuromuscular re-education; Manual Therapy - Soft Tissue Mobilization; Manual Therapy - Joint Manipulation; Pain management; Integrated dry needling; Therapeutic activities; Home exercise program     Subjective Comments: See Discharge Summary dated 22     Initial:}     0/10Post Session:        0/10  Medications Last Reviewed:  2022  Updated Objective Findings:  See Discharge Summary dated 22  Treatment     THERAPEUTIC EXERCISE: (20 minutes):    Exercises per grid below to improve mobility, strength, balance, and coordination. Progressed resistance and repetitions as indicated.      Date  22 Date  22 Date  22 Date  22 Date  22 Date  22 Date  22 Date  22   Activity/Exercise           education  Updated measurements, progress so far, POC      Updated measurements, progress in PT, progression/regression of exercises   sidestepping 2x40 feet, old purple  band + monster walks  2x40 feet, old purple  band + monster walks 2x40 feet, new purple  band + monster walks 2x40 feet, new purple  band + monster walks 2x40 feet, new purple  band + monster walks     Standing marches           Standing hip abd           clam           Hip hikes           Nustep 8 min, level 5 634 steps 8 min, level 5, 645 8 min, level 5, 645 (Bike) 8 min 8 min, level 6,  456 steps  8 min sci fit, level 3    UE assisted squat (For warm up) 2x10          6 minute walk test      6 min for warm up     Lower trunk rotation       2 min    L stretch       2 min                              THERAPEUTIC ACTIVITY: (18 minutes): Therapeutic activities per grid below to improve mobility, strength, coordination, and dynamic movement of right hip to improve functional lifting, carrying, reaching, catching, and overhead activities.    Date  9/7/22 Date  9/12/22 Date  9/14/22 Date  9/19/22 Date  9/21/22 Date  9/26/22 Date  9/28/22   Activity/Exercise          STS     2x10, 16 inch box 3x10, 16 inch box    KB deadlift    [id number] lbs, 4 inch box 2i43a90 lbs, 4 inch box 2h68b88 lbs, 6 inch box 3t86q90 lbs   Sled push/pull 4x40 feet, 75 lbs 3x40 feet, 85 lbs 3x40 feet, 85 lbs 2x40 feet, 100 lbs 3x40 feet, 75 lbs 3x40 feet, 100 lbs    Walking marches          Step up          Lateral step up and over          Cross over step ups          Farmer's carry 3 large laps, 10 lbs  4 large laps, 10 lbs 3 large laps, 15 lbs  3 large laps, 15 lbs 5 small laps, 15 lbs, last 2 unilateral   Floor transfers  X4 from floor with single UE assist, + warm ups from higher surfaces        Circuit  STS x10, 16 inch  KB DL, x10x10 lbs  Fast walk x30 feet to  10 lbs DB and vice versa    X3  TIME: 5:34, 6/10 RPE STS x10, 5 lbs OH press  Walking marches x40 feet  Fast walk    X3  TIME: 6:59 min  7-8/10 RPE  Modified floor transfer, EOB x3  Farmer's carry, 8 lbs  Walking marches x40 feet  Fast walk, 1 large lap  x3  Time: 5:58 min     Modified split squats    2x10, BUE assist, 3 pads 2x10, BUE assist, 3 pads 2x10, BUE assist, 3 pads 3x10, single UE assist       MANUAL THERAPY: (0 minutes):   Joint mobilization, Soft tissue mobilization, and Manipulation was utilized and necessary because of the patient's restricted joint motion, painful spasm, loss of articular motion, and restricted motion of soft tissue.               Date  9/28/2022      Technique Used Grade Level # Time(s) Effect while being performed                                                                                         HEP Log Date    see 8/3/22    2.     3.    4.     5.        POC    Recertification Expiration Date      Plan of Care/Certification Expiration Date: 09/28/22     Visit Count  16    Number of Allowed Visits          Treatment/Session Summary:    Treatment Assessment: See Discharge Summary dated 9/28/22     Communication/Consultation: See Discharge Summary dated 9/28/22  Equipment provided today:  See Discharge Summary dated 9/28/22  Recommendations/Intent for next treatment session: See Discharge Summary dated 9/28/22    Total Treatment Billable Duration:  38 minutes  Time In: 1222  Time Out: [address] PT       Charge Capture  }Post Session Pain  PT Visit Info  [address] [facility] Portal  MD [address]    Future Appointments   Date Time Provider Jose Lamar   11/25/2022  8:30 AM MD NEYMAR Kincaid [facility]   5/25/2023  7:45 AM Perla Bacon MD [facility]

## 2022-10-21 ENCOUNTER — TELEPHONE (OUTPATIENT)
Dept: FAMILY MEDICINE CLINIC | Facility: CLINIC | Age: 77
End: 2022-10-21

## 2022-11-23 ENCOUNTER — OFFICE VISIT (OUTPATIENT)
Dept: FAMILY MEDICINE CLINIC | Facility: CLINIC | Age: 77
End: 2022-11-23
Payer: MEDICARE

## 2022-11-23 VITALS
WEIGHT: 156 LBS | BODY MASS INDEX: 24.48 KG/M2 | DIASTOLIC BLOOD PRESSURE: 88 MMHG | SYSTOLIC BLOOD PRESSURE: 132 MMHG | OXYGEN SATURATION: 96 % | HEART RATE: 86 BPM | HEIGHT: 67 IN | TEMPERATURE: 97.6 F

## 2022-11-23 DIAGNOSIS — I10 PRIMARY HYPERTENSION: Primary | ICD-10-CM

## 2022-11-23 DIAGNOSIS — E78.00 HYPERCHOLESTEROLEMIA: ICD-10-CM

## 2022-11-23 DIAGNOSIS — E03.4 HYPOTHYROIDISM DUE TO ACQUIRED ATROPHY OF THYROID: ICD-10-CM

## 2022-11-23 DIAGNOSIS — M15.9 PRIMARY OSTEOARTHRITIS INVOLVING MULTIPLE JOINTS: ICD-10-CM

## 2022-11-23 PROCEDURE — 1123F ACP DISCUSS/DSCN MKR DOCD: CPT | Performed by: FAMILY MEDICINE

## 2022-11-23 PROCEDURE — G8427 DOCREV CUR MEDS BY ELIG CLIN: HCPCS | Performed by: FAMILY MEDICINE

## 2022-11-23 PROCEDURE — G8399 PT W/DXA RESULTS DOCUMENT: HCPCS | Performed by: FAMILY MEDICINE

## 2022-11-23 PROCEDURE — 1090F PRES/ABSN URINE INCON ASSESS: CPT | Performed by: FAMILY MEDICINE

## 2022-11-23 PROCEDURE — G8420 CALC BMI NORM PARAMETERS: HCPCS | Performed by: FAMILY MEDICINE

## 2022-11-23 PROCEDURE — 99214 OFFICE O/P EST MOD 30 MIN: CPT | Performed by: FAMILY MEDICINE

## 2022-11-23 PROCEDURE — 3078F DIAST BP <80 MM HG: CPT | Performed by: FAMILY MEDICINE

## 2022-11-23 PROCEDURE — 3074F SYST BP LT 130 MM HG: CPT | Performed by: FAMILY MEDICINE

## 2022-11-23 PROCEDURE — 1036F TOBACCO NON-USER: CPT | Performed by: FAMILY MEDICINE

## 2022-11-23 PROCEDURE — G8484 FLU IMMUNIZE NO ADMIN: HCPCS | Performed by: FAMILY MEDICINE

## 2022-11-23 RX ORDER — PRAVASTATIN SODIUM 10 MG
10 TABLET ORAL DAILY
Qty: 90 TABLET | Refills: 1 | Status: SHIPPED | OUTPATIENT
Start: 2022-11-23

## 2022-11-23 RX ORDER — BENAZEPRIL HYDROCHLORIDE 20 MG/1
20 TABLET ORAL DAILY
Qty: 90 TABLET | Refills: 1 | Status: SHIPPED | OUTPATIENT
Start: 2022-11-23

## 2022-11-23 RX ORDER — AMLODIPINE BESYLATE 5 MG/1
5 TABLET ORAL DAILY
Qty: 90 TABLET | Refills: 1 | Status: SHIPPED | OUTPATIENT
Start: 2022-11-23

## 2022-11-23 RX ORDER — LEVOTHYROXINE SODIUM 0.03 MG/1
25 TABLET ORAL
Qty: 90 TABLET | Refills: 1 | Status: SHIPPED | OUTPATIENT
Start: 2022-11-23

## 2022-11-23 ASSESSMENT — ENCOUNTER SYMPTOMS
ABDOMINAL PAIN: 0
EYE DISCHARGE: 0
CONSTIPATION: 0
COUGH: 0
CHEST TIGHTNESS: 0
SINUS PAIN: 0
SHORTNESS OF BREATH: 0
DIARRHEA: 0

## 2022-11-23 ASSESSMENT — PATIENT HEALTH QUESTIONNAIRE - PHQ9
SUM OF ALL RESPONSES TO PHQ9 QUESTIONS 1 & 2: 0
SUM OF ALL RESPONSES TO PHQ QUESTIONS 1-9: 0
2. FEELING DOWN, DEPRESSED OR HOPELESS: 0
1. LITTLE INTEREST OR PLEASURE IN DOING THINGS: 0
SUM OF ALL RESPONSES TO PHQ QUESTIONS 1-9: 0

## 2022-11-23 NOTE — PROGRESS NOTES
Moreno Alcocer is a 69 y.o. female who presents with   Chief Complaint   Patient presents with    Hypertension     Does not check BP at home. Denies unusual HA, dizziness, edema. Hypothyroidism       History of Present Illness  BP has been doing well at home. No CP or SOB. No headaches or edema. No side effects with medications. Exercising regularly. BP has been doing well at home. No CP or SOB. No headaches or edema. No side effects with medications. Exercising regularly. Lipids looked good in May. No Myalgias with statin. Review of Systems  Review of Systems   Constitutional:  Negative for appetite change, fatigue and fever. HENT:  Negative for congestion, ear pain and sinus pain. Eyes:  Negative for discharge. Respiratory:  Negative for cough, chest tightness and shortness of breath. Cardiovascular:  Negative for chest pain, palpitations and leg swelling. Gastrointestinal:  Negative for abdominal pain, constipation and diarrhea. Genitourinary:  Negative for dysuria. Musculoskeletal:  Positive for arthralgias (stable). Negative for joint swelling. Skin:  Negative for rash. Neurological:  Negative for headaches. Hematological:  Negative for adenopathy. Psychiatric/Behavioral:  Negative for dysphoric mood. The patient is not nervous/anxious. Medications  Current Outpatient Medications   Medication Sig Dispense Refill    Multiple Vitamin (MULTIVITAMIN PO) Take by mouth      amLODIPine (NORVASC) 5 MG tablet Take 1 tablet by mouth daily 90 tablet 1    benazepril (LOTENSIN) 20 MG tablet Take 1 tablet by mouth daily 90 tablet 1    levothyroxine (SYNTHROID) 25 MCG tablet Take 1 tablet by mouth every morning (before breakfast) 90 tablet 1    pravastatin (PRAVACHOL) 10 MG tablet Take 1 tablet by mouth daily 90 tablet 1    BABY ASPIRIN PO Take by mouth       No current facility-administered medications for this visit.         Past Medical History  Past Medical History: Diagnosis Date    Agatston coronary artery calcium score less than 100 5/2014    Calcium score of 24; all in LAD; referred to cardiology    Allergic rhinitis 1/22/2014    Arthritis     Colon polyps 1/2015    H/O cardiovascular stress test 5/2014    within normal    H/O mammogram 7/2013    with Dr. Karson Mcdaniel    Hematuria     HTN (hypertension) 1/22/2014    Hypercholesterolemia     Hypothyroidism 1/22/2014    Otitis externa     Otitis media     Pap smear for cervical cancer screening 7/2013    Dr. Karson Mcdaniel    Thyroid disease     URI (upper respiratory infection)        Surgical History  Past Surgical History:   Procedure Laterality Date    COLONOSCOPY  1/2015    repeat in 3 yrs (Dr. Annalisa Fernandez)    [address] Right 09/05/2019          Family History  Family History   Problem Relation Age of Onset    Cancer Sister         Colon Cancer    No Known Problems Sister     Glaucoma Brother     Heart Disease Brother     Glaucoma Mother     Hypertension Mother     Diabetes Brother     Heart Disease Other     Diabetes Other     Hypertension Paternal Uncle     Heart Disease Father     Dementia Mother        Social History  Social History     Socioeconomic History    Marital status:      Spouse name: Not on file    Number of children: Not on file    Years of education: Not on file    Highest education level: Not on file   Occupational History    Not on file   Tobacco Use    Smoking status: Never    Smokeless tobacco: Never   Vaping Use    Vaping Use: Never used   Substance and Sexual Activity    Alcohol use: Not Currently    Drug use: No    Sexual activity: Not on file   Other Topics Concern    Not on file   Social History Narrative    Not on file     Social Determinants of Health     Financial Resource Strain: Not on file   Food Insecurity: Not on file   Transportation Needs: Not on file   Physical Activity: Insufficiently Active    Days of Exercise per Week: 4 days    Minutes of Exercise per Session: 30 min   Stress: Not on file   Social Connections: Not on file   Intimate Partner Violence: Not on file   Housing Stability: Not on file       Social History     Tobacco Use   Smoking Status Never   Smokeless Tobacco Never       Allergies  No Known Allergies    Vital Signs  Body mass index is 24.8 kg/m². Vitals:    11/23/22 0834 11/23/22 0852   BP: (!) 138/92 132/88   Site: Left Upper Arm    Position: Sitting    Cuff Size: Medium Adult    Pulse: 86    Temp: 97.6 °F (36.4 °C)    TempSrc: Temporal    SpO2: 96%    Weight: 156 lb (70.8 kg)    Height: 5' 6.5\" (1.689 m)        Physical Exam  Physical Exam  Vitals reviewed. Constitutional:       Appearance: Normal appearance. HENT:      Head: Normocephalic. Right Ear: Tympanic membrane normal.      Left Ear: Tympanic membrane normal.      Nose: No congestion. Mouth/Throat:      Pharynx: No oropharyngeal exudate or posterior oropharyngeal erythema. Eyes:      Extraocular Movements: Extraocular movements intact. Conjunctiva/sclera: Conjunctivae normal.   Cardiovascular:      Rate and Rhythm: Normal rate and regular rhythm. Heart sounds: Normal heart sounds. No murmur heard. Pulmonary:      Effort: Pulmonary effort is normal.      Breath sounds: Normal breath sounds. No wheezing. Musculoskeletal:         General: No tenderness. Right lower leg: No edema. Left lower leg: No edema. Lymphadenopathy:      Cervical: No cervical adenopathy. Skin:     General: Skin is warm and dry. Findings: No rash. Neurological:      General: No focal deficit present. Mental Status: She is alert. Psychiatric:         Mood and Affect: Mood normal.         Thought Content: Thought content normal.        Assessment and Plan  Yao Snyder was seen today for hypertension and hypothyroidism. Diagnoses and all orders for this visit:    Primary hypertension  -     amLODIPine (NORVASC) 5 MG tablet; Take 1 tablet by mouth daily  -     benazepril (LOTENSIN) 20 MG tablet;  Take 1 tablet by mouth daily    Hypothyroidism due to acquired atrophy of thyroid  -     levothyroxine (SYNTHROID) 25 MCG tablet; Take 1 tablet by mouth every morning (before breakfast)    Hypercholesterolemia  -     pravastatin (PRAVACHOL) 10 MG tablet; Take 1 tablet by mouth daily    Primary osteoarthritis involving multiple joints  Cont medications at dosages as above  Recheck for cpx in 6mo  Call for new sxs    On this date I have spent 30 minutes reviewing previous notes, test results and face to face with the patient discussing the diagnosis and importance of compliance with the treatment plan as well as documenting on the day of the visit.

## 2023-05-25 ENCOUNTER — OFFICE VISIT (OUTPATIENT)
Dept: FAMILY MEDICINE CLINIC | Facility: CLINIC | Age: 78
End: 2023-05-25
Payer: MEDICARE

## 2023-05-25 VITALS
HEART RATE: 77 BPM | WEIGHT: 156.4 LBS | SYSTOLIC BLOOD PRESSURE: 126 MMHG | TEMPERATURE: 97.6 F | BODY MASS INDEX: 25.13 KG/M2 | OXYGEN SATURATION: 95 % | DIASTOLIC BLOOD PRESSURE: 78 MMHG | HEIGHT: 66 IN

## 2023-05-25 DIAGNOSIS — Z00.00 ENCOUNTER FOR ANNUAL WELLNESS VISIT (AWV) IN MEDICARE PATIENT: Primary | ICD-10-CM

## 2023-05-25 DIAGNOSIS — Z96.641 H/O TOTAL HIP ARTHROPLASTY, RIGHT: ICD-10-CM

## 2023-05-25 DIAGNOSIS — M15.9 PRIMARY OSTEOARTHRITIS INVOLVING MULTIPLE JOINTS: ICD-10-CM

## 2023-05-25 DIAGNOSIS — E03.4 HYPOTHYROIDISM DUE TO ACQUIRED ATROPHY OF THYROID: ICD-10-CM

## 2023-05-25 DIAGNOSIS — E78.00 HYPERCHOLESTEROLEMIA: ICD-10-CM

## 2023-05-25 DIAGNOSIS — I10 PRIMARY HYPERTENSION: ICD-10-CM

## 2023-05-25 LAB
APPEARANCE UR: CLEAR
BACTERIA URNS QL MICRO: NEGATIVE /HPF
BASOPHILS # BLD: 0 K/UL (ref 0–0.2)
BASOPHILS NFR BLD: 1 % (ref 0–2)
BILIRUB UR QL: NEGATIVE
CASTS URNS QL MICRO: ABNORMAL /LPF (ref 0–2)
COLOR UR: ABNORMAL
DIFFERENTIAL METHOD BLD: ABNORMAL
EOSINOPHIL # BLD: 0.4 K/UL (ref 0–0.8)
EOSINOPHIL NFR BLD: 5 % (ref 0.5–7.8)
EPI CELLS #/AREA URNS HPF: ABNORMAL /HPF (ref 0–5)
ERYTHROCYTE [DISTWIDTH] IN BLOOD BY AUTOMATED COUNT: 13.2 % (ref 11.9–14.6)
GLUCOSE UR STRIP.AUTO-MCNC: NEGATIVE MG/DL
HCT VFR BLD AUTO: 45.9 % (ref 35.8–46.3)
HGB BLD-MCNC: 14.6 G/DL (ref 11.7–15.4)
HGB UR QL STRIP: ABNORMAL
IMM GRANULOCYTES # BLD AUTO: 0 K/UL (ref 0–0.5)
IMM GRANULOCYTES NFR BLD AUTO: 0 % (ref 0–5)
KETONES UR QL STRIP.AUTO: NEGATIVE MG/DL
LEUKOCYTE ESTERASE UR QL STRIP.AUTO: ABNORMAL
LYMPHOCYTES # BLD: 1.8 K/UL (ref 0.5–4.6)
LYMPHOCYTES NFR BLD: 23 % (ref 13–44)
MCH RBC QN AUTO: 28.8 PG (ref 26.1–32.9)
MCHC RBC AUTO-ENTMCNC: 31.8 G/DL (ref 31.4–35)
MCV RBC AUTO: 90.5 FL (ref 82–102)
MONOCYTES # BLD: 0.7 K/UL (ref 0.1–1.3)
MONOCYTES NFR BLD: 9 % (ref 4–12)
MUCOUS THREADS URNS QL MICRO: 0 /LPF
NEUTS SEG # BLD: 4.9 K/UL (ref 1.7–8.2)
NEUTS SEG NFR BLD: 62 % (ref 43–78)
NITRITE UR QL STRIP.AUTO: NEGATIVE
NRBC # BLD: 0 K/UL (ref 0–0.2)
PH UR STRIP: 6.5 (ref 5–9)
PLATELET # BLD AUTO: 245 K/UL (ref 150–450)
PMV BLD AUTO: 8.7 FL (ref 9.4–12.3)
PROT UR STRIP-MCNC: NEGATIVE MG/DL
RBC # BLD AUTO: 5.07 M/UL (ref 4.05–5.2)
RBC #/AREA URNS HPF: ABNORMAL /HPF (ref 0–5)
SP GR UR REFRACTOMETRY: 1 (ref 1–1.02)
URINE CULTURE IF INDICATED: ABNORMAL
UROBILINOGEN UR QL STRIP.AUTO: 0.2 EU/DL (ref 0.2–1)
WBC # BLD AUTO: 7.9 K/UL (ref 4.3–11.1)
WBC URNS QL MICRO: ABNORMAL /HPF (ref 0–4)

## 2023-05-25 PROCEDURE — 1090F PRES/ABSN URINE INCON ASSESS: CPT | Performed by: FAMILY MEDICINE

## 2023-05-25 PROCEDURE — G0439 PPPS, SUBSEQ VISIT: HCPCS | Performed by: FAMILY MEDICINE

## 2023-05-25 PROCEDURE — 99213 OFFICE O/P EST LOW 20 MIN: CPT | Performed by: FAMILY MEDICINE

## 2023-05-25 PROCEDURE — G8427 DOCREV CUR MEDS BY ELIG CLIN: HCPCS | Performed by: FAMILY MEDICINE

## 2023-05-25 PROCEDURE — 3074F SYST BP LT 130 MM HG: CPT | Performed by: FAMILY MEDICINE

## 2023-05-25 PROCEDURE — G8417 CALC BMI ABV UP PARAM F/U: HCPCS | Performed by: FAMILY MEDICINE

## 2023-05-25 PROCEDURE — 1123F ACP DISCUSS/DSCN MKR DOCD: CPT | Performed by: FAMILY MEDICINE

## 2023-05-25 PROCEDURE — 1036F TOBACCO NON-USER: CPT | Performed by: FAMILY MEDICINE

## 2023-05-25 PROCEDURE — G8399 PT W/DXA RESULTS DOCUMENT: HCPCS | Performed by: FAMILY MEDICINE

## 2023-05-25 PROCEDURE — 3078F DIAST BP <80 MM HG: CPT | Performed by: FAMILY MEDICINE

## 2023-05-25 RX ORDER — BENAZEPRIL HYDROCHLORIDE 20 MG/1
20 TABLET ORAL DAILY
Qty: 90 TABLET | Refills: 1 | Status: SHIPPED | OUTPATIENT
Start: 2023-05-25

## 2023-05-25 RX ORDER — AMLODIPINE BESYLATE 5 MG/1
5 TABLET ORAL DAILY
Qty: 90 TABLET | Refills: 1 | Status: SHIPPED | OUTPATIENT
Start: 2023-05-25 | End: 2023-05-25 | Stop reason: SDUPTHER

## 2023-05-25 RX ORDER — AMLODIPINE BESYLATE 5 MG/1
5 TABLET ORAL DAILY
Qty: 90 TABLET | Refills: 1 | Status: SHIPPED | OUTPATIENT
Start: 2023-05-25

## 2023-05-25 RX ORDER — BENAZEPRIL HYDROCHLORIDE 20 MG/1
20 TABLET ORAL DAILY
Qty: 90 TABLET | Refills: 1 | Status: SHIPPED | OUTPATIENT
Start: 2023-05-25 | End: 2023-05-25 | Stop reason: SDUPTHER

## 2023-05-25 RX ORDER — LEVOTHYROXINE SODIUM 0.03 MG/1
25 TABLET ORAL
Qty: 90 TABLET | Refills: 1 | Status: SHIPPED | OUTPATIENT
Start: 2023-05-25

## 2023-05-25 RX ORDER — PRAVASTATIN SODIUM 10 MG
10 TABLET ORAL DAILY
Qty: 90 TABLET | Refills: 1 | Status: SHIPPED | OUTPATIENT
Start: 2023-05-25 | End: 2023-05-25 | Stop reason: SDUPTHER

## 2023-05-25 RX ORDER — LEVOTHYROXINE SODIUM 0.03 MG/1
25 TABLET ORAL
Qty: 90 TABLET | Refills: 1 | Status: SHIPPED | OUTPATIENT
Start: 2023-05-25 | End: 2023-05-25 | Stop reason: SDUPTHER

## 2023-05-25 RX ORDER — PRAVASTATIN SODIUM 10 MG
10 TABLET ORAL DAILY
Qty: 90 TABLET | Refills: 1 | Status: SHIPPED | OUTPATIENT
Start: 2023-05-25

## 2023-05-25 SDOH — ECONOMIC STABILITY: FOOD INSECURITY: WITHIN THE PAST 12 MONTHS, THE FOOD YOU BOUGHT JUST DIDN'T LAST AND YOU DIDN'T HAVE MONEY TO GET MORE.: NEVER TRUE

## 2023-05-25 SDOH — ECONOMIC STABILITY: HOUSING INSECURITY
IN THE LAST 12 MONTHS, WAS THERE A TIME WHEN YOU DID NOT HAVE A STEADY PLACE TO SLEEP OR SLEPT IN A SHELTER (INCLUDING NOW)?: NO

## 2023-05-25 SDOH — ECONOMIC STABILITY: FOOD INSECURITY: WITHIN THE PAST 12 MONTHS, YOU WORRIED THAT YOUR FOOD WOULD RUN OUT BEFORE YOU GOT MONEY TO BUY MORE.: NEVER TRUE

## 2023-05-25 SDOH — ECONOMIC STABILITY: INCOME INSECURITY: HOW HARD IS IT FOR YOU TO PAY FOR THE VERY BASICS LIKE FOOD, HOUSING, MEDICAL CARE, AND HEATING?: NOT HARD AT ALL

## 2023-05-25 ASSESSMENT — PATIENT HEALTH QUESTIONNAIRE - PHQ9
SUM OF ALL RESPONSES TO PHQ QUESTIONS 1-9: 0
1. LITTLE INTEREST OR PLEASURE IN DOING THINGS: 0
SUM OF ALL RESPONSES TO PHQ QUESTIONS 1-9: 0
SUM OF ALL RESPONSES TO PHQ QUESTIONS 1-9: 0
SUM OF ALL RESPONSES TO PHQ9 QUESTIONS 1 & 2: 0
2. FEELING DOWN, DEPRESSED OR HOPELESS: 0
SUM OF ALL RESPONSES TO PHQ QUESTIONS 1-9: 0

## 2023-05-25 ASSESSMENT — LIFESTYLE VARIABLES
HOW MANY STANDARD DRINKS CONTAINING ALCOHOL DO YOU HAVE ON A TYPICAL DAY: PATIENT DOES NOT DRINK
HOW OFTEN DO YOU HAVE A DRINK CONTAINING ALCOHOL: NEVER

## 2023-05-25 NOTE — PROGRESS NOTES
Medicare Annual Wellness Visit    Kyrie Groves is here for Medicare AWV and Hip Pain (Left, hx right hip replacement)    Assessment & Plan   Encounter for annual wellness visit (AWV) in Medicare patient  Primary hypertension  -     amLODIPine (NORVASC) 5 MG tablet; Take 1 tablet by mouth daily, Disp-90 tablet, R-1Normal  -     benazepril (LOTENSIN) 20 MG tablet; Take 1 tablet by mouth daily, Disp-90 tablet, R-1Normal  -     Comprehensive Metabolic Panel; Future  -     CBC with Auto Differential; Future  -     Urinalysis with Reflex to Culture; Future  Hypothyroidism due to acquired atrophy of thyroid  -     levothyroxine (SYNTHROID) 25 MCG tablet; Take 1 tablet by mouth every morning (before breakfast), Disp-90 tablet, R-1Normal  -     TSH; Future  Hypercholesterolemia  -     pravastatin (PRAVACHOL) 10 MG tablet; Take 1 tablet by mouth daily, Disp-90 tablet, R-1Normal  -     Lipid Panel; Future  Primary osteoarthritis involving multiple joints  H/O total hip arthroplasty, right      Worsening hip pain- Tylenol prn; investigating anterior hip replacement option and will let us know if she desires 2nd opinion    Recheck TSH, lipids and adjust med if needed Pravachol 10mg and Synthroid 25mcg    BP stable on Lotensin and Amlodipine      25 min on non- AWV portion of history and exam  Recommendations for Preventive Services Due: see orders and patient instructions/AVS.  Recommended screening schedule for the next 5-10 years is provided to the patient in written form: see Patient Instructions/AVS.     No follow-ups on file. Subjective   The following acute and/or chronic problems were also addressed today:  Pt with R hip replacement 4 years ago. Some worsening of pain L hip and L shoulder. May need replacement in future. Had fracture femur following R THR. Walking and working in yard. BP has been doing well at home. No CP or SOB. No headaches or edema. No side effects with medications.   Exercising

## 2023-05-26 LAB
ALBUMIN SERPL-MCNC: 3.7 G/DL (ref 3.2–4.6)
ALBUMIN/GLOB SERPL: 0.9 (ref 0.4–1.6)
ALP SERPL-CCNC: 77 U/L (ref 50–136)
ALT SERPL-CCNC: 23 U/L (ref 12–65)
ANION GAP SERPL CALC-SCNC: 6 MMOL/L (ref 2–11)
AST SERPL-CCNC: 20 U/L (ref 15–37)
BILIRUB SERPL-MCNC: 1 MG/DL (ref 0.2–1.1)
BUN SERPL-MCNC: 9 MG/DL (ref 8–23)
CALCIUM SERPL-MCNC: 9.6 MG/DL (ref 8.3–10.4)
CHLORIDE SERPL-SCNC: 104 MMOL/L (ref 101–110)
CHOLEST SERPL-MCNC: 169 MG/DL
CO2 SERPL-SCNC: 30 MMOL/L (ref 21–32)
CREAT SERPL-MCNC: 0.8 MG/DL (ref 0.6–1)
GLOBULIN SER CALC-MCNC: 3.9 G/DL (ref 2.8–4.5)
GLUCOSE SERPL-MCNC: 94 MG/DL (ref 65–100)
HDLC SERPL-MCNC: 57 MG/DL (ref 40–60)
HDLC SERPL: 3
LDLC SERPL CALC-MCNC: 83.4 MG/DL
POTASSIUM SERPL-SCNC: 3.7 MMOL/L (ref 3.5–5.1)
PROT SERPL-MCNC: 7.6 G/DL (ref 6.3–8.2)
SODIUM SERPL-SCNC: 140 MMOL/L (ref 133–143)
TRIGL SERPL-MCNC: 143 MG/DL (ref 35–150)
TSH, 3RD GENERATION: 2 UIU/ML (ref 0.36–3.74)
VLDLC SERPL CALC-MCNC: 28.6 MG/DL (ref 6–23)

## 2023-11-21 ENCOUNTER — OFFICE VISIT (OUTPATIENT)
Dept: FAMILY MEDICINE CLINIC | Facility: CLINIC | Age: 78
End: 2023-11-21
Payer: MEDICARE

## 2023-11-21 VITALS
DIASTOLIC BLOOD PRESSURE: 80 MMHG | SYSTOLIC BLOOD PRESSURE: 122 MMHG | OXYGEN SATURATION: 96 % | WEIGHT: 155.8 LBS | HEIGHT: 66 IN | HEART RATE: 85 BPM | TEMPERATURE: 97.3 F | BODY MASS INDEX: 25.04 KG/M2

## 2023-11-21 DIAGNOSIS — E78.00 HYPERCHOLESTEROLEMIA: ICD-10-CM

## 2023-11-21 DIAGNOSIS — I10 PRIMARY HYPERTENSION: ICD-10-CM

## 2023-11-21 DIAGNOSIS — E03.4 HYPOTHYROIDISM DUE TO ACQUIRED ATROPHY OF THYROID: ICD-10-CM

## 2023-11-21 PROCEDURE — 3079F DIAST BP 80-89 MM HG: CPT | Performed by: FAMILY MEDICINE

## 2023-11-21 PROCEDURE — 3074F SYST BP LT 130 MM HG: CPT | Performed by: FAMILY MEDICINE

## 2023-11-21 PROCEDURE — G8484 FLU IMMUNIZE NO ADMIN: HCPCS | Performed by: FAMILY MEDICINE

## 2023-11-21 PROCEDURE — 99213 OFFICE O/P EST LOW 20 MIN: CPT | Performed by: FAMILY MEDICINE

## 2023-11-21 PROCEDURE — 1123F ACP DISCUSS/DSCN MKR DOCD: CPT | Performed by: FAMILY MEDICINE

## 2023-11-21 PROCEDURE — 1036F TOBACCO NON-USER: CPT | Performed by: FAMILY MEDICINE

## 2023-11-21 PROCEDURE — 1090F PRES/ABSN URINE INCON ASSESS: CPT | Performed by: FAMILY MEDICINE

## 2023-11-21 PROCEDURE — G8399 PT W/DXA RESULTS DOCUMENT: HCPCS | Performed by: FAMILY MEDICINE

## 2023-11-21 PROCEDURE — G8427 DOCREV CUR MEDS BY ELIG CLIN: HCPCS | Performed by: FAMILY MEDICINE

## 2023-11-21 PROCEDURE — G8417 CALC BMI ABV UP PARAM F/U: HCPCS | Performed by: FAMILY MEDICINE

## 2023-11-21 RX ORDER — AMLODIPINE BESYLATE 5 MG/1
5 TABLET ORAL DAILY
Qty: 90 TABLET | Refills: 1 | Status: SHIPPED | OUTPATIENT
Start: 2023-11-21

## 2023-11-21 RX ORDER — BENAZEPRIL HYDROCHLORIDE 20 MG/1
20 TABLET ORAL DAILY
Qty: 90 TABLET | Refills: 1 | Status: SHIPPED | OUTPATIENT
Start: 2023-11-21

## 2023-11-21 RX ORDER — LEVOTHYROXINE SODIUM 0.03 MG/1
25 TABLET ORAL
Qty: 90 TABLET | Refills: 1 | Status: SHIPPED | OUTPATIENT
Start: 2023-11-21

## 2023-11-21 RX ORDER — PRAVASTATIN SODIUM 10 MG
10 TABLET ORAL DAILY
Qty: 90 TABLET | Refills: 1 | Status: SHIPPED | OUTPATIENT
Start: 2023-11-21

## 2023-11-21 ASSESSMENT — ENCOUNTER SYMPTOMS
SINUS PAIN: 0
SHORTNESS OF BREATH: 0
DIARRHEA: 0
EYE DISCHARGE: 0
CHEST TIGHTNESS: 0
COUGH: 0
CONSTIPATION: 0
ABDOMINAL PAIN: 0

## 2023-11-21 NOTE — PROGRESS NOTES
Catherine Amezcua is a 69 y.o. female who presents with   Chief Complaint   Patient presents with    Hypertension     Checks BP at home occasionally, states well-controlled. Denies unusual HAs, dizziness, edema. Hyperlipidemia     Not fasting    Hypothyroidism       History of Present Illness  BP has been doing well at home. No CP or SOB. No headaches or edema. No side effects with medications. Exercising regularly. Arthritis in shoulders. Here for recheck of thyroid. No temperature intolerance. No palpitations. No skin or hair changes. No increased fatigue. No jitteriness. Mood good. Labs looked good in May. Review of Systems  Review of Systems   Constitutional:  Negative for appetite change, fatigue and fever. HENT:  Negative for congestion, ear pain and sinus pain. Eyes:  Negative for discharge. Respiratory:  Negative for cough, chest tightness and shortness of breath. Cardiovascular:  Negative for chest pain, palpitations and leg swelling. Gastrointestinal:  Negative for abdominal pain, constipation and diarrhea. Genitourinary:  Negative for dysuria. Musculoskeletal:  Negative for joint swelling. Skin:  Negative for rash. Neurological:  Negative for headaches. Hematological:  Negative for adenopathy. Psychiatric/Behavioral:  Negative for dysphoric mood. The patient is not nervous/anxious. Medications  Current Outpatient Medications   Medication Sig Dispense Refill    benazepril (LOTENSIN) 20 MG tablet Take 1 tablet by mouth daily 90 tablet 1    amLODIPine (NORVASC) 5 MG tablet Take 1 tablet by mouth daily 90 tablet 1    levothyroxine (SYNTHROID) 25 MCG tablet Take 1 tablet by mouth every morning (before breakfast) 90 tablet 1    pravastatin (PRAVACHOL) 10 MG tablet Take 1 tablet by mouth daily 90 tablet 1    Multiple Vitamin (MULTIVITAMIN PO) Take by mouth      BABY ASPIRIN PO Take by mouth       No current facility-administered medications for this visit.

## 2024-05-29 ENCOUNTER — OFFICE VISIT (OUTPATIENT)
Dept: FAMILY MEDICINE CLINIC | Facility: CLINIC | Age: 79
End: 2024-05-29
Payer: MEDICARE

## 2024-05-29 VITALS
DIASTOLIC BLOOD PRESSURE: 74 MMHG | HEART RATE: 76 BPM | WEIGHT: 153 LBS | SYSTOLIC BLOOD PRESSURE: 126 MMHG | BODY MASS INDEX: 24.01 KG/M2 | OXYGEN SATURATION: 98 % | HEIGHT: 67 IN | TEMPERATURE: 97.6 F

## 2024-05-29 DIAGNOSIS — R20.2 PARESTHESIA: ICD-10-CM

## 2024-05-29 DIAGNOSIS — M15.9 PRIMARY OSTEOARTHRITIS INVOLVING MULTIPLE JOINTS: ICD-10-CM

## 2024-05-29 DIAGNOSIS — Z12.31 SCREENING MAMMOGRAM, ENCOUNTER FOR: ICD-10-CM

## 2024-05-29 DIAGNOSIS — E03.4 HYPOTHYROIDISM DUE TO ACQUIRED ATROPHY OF THYROID: ICD-10-CM

## 2024-05-29 DIAGNOSIS — E78.00 HYPERCHOLESTEROLEMIA: ICD-10-CM

## 2024-05-29 DIAGNOSIS — Z00.00 ENCOUNTER FOR ANNUAL WELLNESS VISIT (AWV) IN MEDICARE PATIENT: Primary | ICD-10-CM

## 2024-05-29 DIAGNOSIS — I10 PRIMARY HYPERTENSION: ICD-10-CM

## 2024-05-29 LAB
BASOPHILS # BLD: 0.1 K/UL (ref 0–0.2)
BASOPHILS NFR BLD: 1 % (ref 0–2)
DIFFERENTIAL METHOD BLD: ABNORMAL
EOSINOPHIL # BLD: 0.3 K/UL (ref 0–0.8)
EOSINOPHIL NFR BLD: 4 % (ref 0.5–7.8)
ERYTHROCYTE [DISTWIDTH] IN BLOOD BY AUTOMATED COUNT: 13.2 % (ref 11.9–14.6)
HCT VFR BLD AUTO: 46 % (ref 35.8–46.3)
HGB BLD-MCNC: 14.5 G/DL (ref 11.7–15.4)
IMM GRANULOCYTES # BLD AUTO: 0 K/UL (ref 0–0.5)
IMM GRANULOCYTES NFR BLD AUTO: 0 % (ref 0–5)
LYMPHOCYTES # BLD: 1.7 K/UL (ref 0.5–4.6)
LYMPHOCYTES NFR BLD: 27 % (ref 13–44)
MCH RBC QN AUTO: 28.4 PG (ref 26.1–32.9)
MCHC RBC AUTO-ENTMCNC: 31.5 G/DL (ref 31.4–35)
MCV RBC AUTO: 90.2 FL (ref 82–102)
MONOCYTES # BLD: 0.7 K/UL (ref 0.1–1.3)
MONOCYTES NFR BLD: 11 % (ref 4–12)
NEUTS SEG # BLD: 3.6 K/UL (ref 1.7–8.2)
NEUTS SEG NFR BLD: 57 % (ref 43–78)
NRBC # BLD: 0 K/UL (ref 0–0.2)
PLATELET # BLD AUTO: 224 K/UL (ref 150–450)
PMV BLD AUTO: 8.6 FL (ref 9.4–12.3)
RBC # BLD AUTO: 5.1 M/UL (ref 4.05–5.2)
WBC # BLD AUTO: 6.3 K/UL (ref 4.3–11.1)

## 2024-05-29 PROCEDURE — G8399 PT W/DXA RESULTS DOCUMENT: HCPCS | Performed by: FAMILY MEDICINE

## 2024-05-29 PROCEDURE — G8427 DOCREV CUR MEDS BY ELIG CLIN: HCPCS | Performed by: FAMILY MEDICINE

## 2024-05-29 PROCEDURE — G0439 PPPS, SUBSEQ VISIT: HCPCS | Performed by: FAMILY MEDICINE

## 2024-05-29 PROCEDURE — G8420 CALC BMI NORM PARAMETERS: HCPCS | Performed by: FAMILY MEDICINE

## 2024-05-29 PROCEDURE — 1123F ACP DISCUSS/DSCN MKR DOCD: CPT | Performed by: FAMILY MEDICINE

## 2024-05-29 PROCEDURE — 3074F SYST BP LT 130 MM HG: CPT | Performed by: FAMILY MEDICINE

## 2024-05-29 PROCEDURE — 3078F DIAST BP <80 MM HG: CPT | Performed by: FAMILY MEDICINE

## 2024-05-29 PROCEDURE — 1036F TOBACCO NON-USER: CPT | Performed by: FAMILY MEDICINE

## 2024-05-29 PROCEDURE — 1090F PRES/ABSN URINE INCON ASSESS: CPT | Performed by: FAMILY MEDICINE

## 2024-05-29 PROCEDURE — 99213 OFFICE O/P EST LOW 20 MIN: CPT | Performed by: FAMILY MEDICINE

## 2024-05-29 RX ORDER — BENAZEPRIL HYDROCHLORIDE 20 MG/1
20 TABLET ORAL DAILY
Qty: 90 TABLET | Refills: 1 | Status: SHIPPED | OUTPATIENT
Start: 2024-05-29

## 2024-05-29 RX ORDER — PRAVASTATIN SODIUM 10 MG
10 TABLET ORAL DAILY
Qty: 90 TABLET | Refills: 1 | Status: SHIPPED | OUTPATIENT
Start: 2024-05-29

## 2024-05-29 RX ORDER — AMLODIPINE BESYLATE 5 MG/1
5 TABLET ORAL DAILY
Qty: 90 TABLET | Refills: 1 | Status: SHIPPED | OUTPATIENT
Start: 2024-05-29

## 2024-05-29 RX ORDER — LEVOTHYROXINE SODIUM 0.03 MG/1
25 TABLET ORAL
Qty: 90 TABLET | Refills: 1 | Status: SHIPPED | OUTPATIENT
Start: 2024-05-29

## 2024-05-29 SDOH — ECONOMIC STABILITY: FOOD INSECURITY: WITHIN THE PAST 12 MONTHS, YOU WORRIED THAT YOUR FOOD WOULD RUN OUT BEFORE YOU GOT MONEY TO BUY MORE.: NEVER TRUE

## 2024-05-29 SDOH — ECONOMIC STABILITY: INCOME INSECURITY: HOW HARD IS IT FOR YOU TO PAY FOR THE VERY BASICS LIKE FOOD, HOUSING, MEDICAL CARE, AND HEATING?: NOT HARD AT ALL

## 2024-05-29 SDOH — ECONOMIC STABILITY: FOOD INSECURITY: WITHIN THE PAST 12 MONTHS, THE FOOD YOU BOUGHT JUST DIDN'T LAST AND YOU DIDN'T HAVE MONEY TO GET MORE.: NEVER TRUE

## 2024-05-29 ASSESSMENT — PATIENT HEALTH QUESTIONNAIRE - PHQ9
SUM OF ALL RESPONSES TO PHQ QUESTIONS 1-9: 0
SUM OF ALL RESPONSES TO PHQ9 QUESTIONS 1 & 2: 0
SUM OF ALL RESPONSES TO PHQ QUESTIONS 1-9: 0
SUM OF ALL RESPONSES TO PHQ QUESTIONS 1-9: 0
2. FEELING DOWN, DEPRESSED OR HOPELESS: NOT AT ALL
1. LITTLE INTEREST OR PLEASURE IN DOING THINGS: NOT AT ALL
SUM OF ALL RESPONSES TO PHQ QUESTIONS 1-9: 0

## 2024-05-29 NOTE — PROGRESS NOTES
Medications    Medication Sig Taking? Authorizing Provider   amLODIPine (NORVASC) 5 MG tablet Take 1 tablet by mouth daily Yes Sydnie Menon MD   benazepril (LOTENSIN) 20 MG tablet Take 1 tablet by mouth daily Yes Sydnie Menon MD   levothyroxine (SYNTHROID) 25 MCG tablet Take 1 tablet by mouth every morning (before breakfast) Yes Sydnie Menon MD   pravastatin (PRAVACHOL) 10 MG tablet Take 1 tablet by mouth daily Yes Sydnie Menon MD   Multiple Vitamin (MULTIVITAMIN PO) Take by mouth Yes Provider, MD Yoana   BABY ASPIRIN PO Take by mouth Yes Automatic Reconciliation, Ar       CareTeam (Including outside providers/suppliers regularly involved in providing care):   Patient Care Team:  Sydnie Meonn MD as PCP - General (Family Medicine)  Sydnie Menon MD as PCP - Empaneled Provider     Reviewed and updated this visit:  Tobacco  Allergies  Meds  Med Hx  Surg Hx  Soc Hx  Fam Hx

## 2024-05-30 LAB
ALBUMIN SERPL-MCNC: 3.8 G/DL (ref 3.2–4.6)
ALBUMIN/GLOB SERPL: 1.2 (ref 1–1.9)
ALP SERPL-CCNC: 75 U/L (ref 35–104)
ALT SERPL-CCNC: 18 U/L (ref 12–65)
ANION GAP SERPL CALC-SCNC: 11 MMOL/L (ref 9–18)
APPEARANCE UR: CLEAR
AST SERPL-CCNC: 24 U/L (ref 15–37)
BACTERIA URNS QL MICRO: NEGATIVE /HPF
BILIRUB SERPL-MCNC: 1.1 MG/DL (ref 0–1.2)
BILIRUB UR QL: NEGATIVE
BUN SERPL-MCNC: 11 MG/DL (ref 8–23)
CALCIUM SERPL-MCNC: 9.4 MG/DL (ref 8.8–10.2)
CASTS URNS QL MICRO: NORMAL /LPF (ref 0–2)
CHLORIDE SERPL-SCNC: 104 MMOL/L (ref 98–107)
CHOLEST SERPL-MCNC: 157 MG/DL (ref 0–200)
CO2 SERPL-SCNC: 28 MMOL/L (ref 20–28)
COLOR UR: NORMAL
CREAT SERPL-MCNC: 0.71 MG/DL (ref 0.6–1.1)
EPI CELLS #/AREA URNS HPF: NORMAL /HPF (ref 0–5)
GLOBULIN SER CALC-MCNC: 3.1 G/DL (ref 2.3–3.5)
GLUCOSE SERPL-MCNC: 90 MG/DL (ref 70–99)
GLUCOSE UR STRIP.AUTO-MCNC: NEGATIVE MG/DL
HDLC SERPL-MCNC: 52 MG/DL (ref 40–60)
HDLC SERPL: 3 (ref 0–5)
HGB UR QL STRIP: NEGATIVE
KETONES UR QL STRIP.AUTO: NEGATIVE MG/DL
LDLC SERPL CALC-MCNC: 71 MG/DL (ref 0–100)
LEUKOCYTE ESTERASE UR QL STRIP.AUTO: NEGATIVE
MUCOUS THREADS URNS QL MICRO: 0 /LPF
NITRITE UR QL STRIP.AUTO: NEGATIVE
PH UR STRIP: 6 (ref 5–9)
POTASSIUM SERPL-SCNC: 4.1 MMOL/L (ref 3.5–5.1)
PROT SERPL-MCNC: 6.8 G/DL (ref 6.3–8.2)
PROT UR STRIP-MCNC: NEGATIVE MG/DL
RBC #/AREA URNS HPF: NORMAL /HPF (ref 0–5)
SODIUM SERPL-SCNC: 143 MMOL/L (ref 136–145)
SP GR UR REFRACTOMETRY: 1.01 (ref 1–1.02)
TRIGL SERPL-MCNC: 175 MG/DL (ref 0–150)
TSH W FREE THYROID IF ABNORMAL: 2.57 UIU/ML (ref 0.27–4.2)
URINE CULTURE IF INDICATED: NORMAL
UROBILINOGEN UR QL STRIP.AUTO: 0.2 EU/DL (ref 0.2–1)
VIT B12 SERPL-MCNC: 889 PG/ML (ref 193–986)
VLDLC SERPL CALC-MCNC: 35 MG/DL (ref 6–23)
WBC URNS QL MICRO: NORMAL /HPF (ref 0–4)

## 2024-07-18 ENCOUNTER — HOSPITAL ENCOUNTER (OUTPATIENT)
Dept: MAMMOGRAPHY | Age: 79
Discharge: HOME | End: 2024-07-18
Attending: FAMILY MEDICINE
Payer: MEDICARE

## 2024-07-18 DIAGNOSIS — Z12.31 SCREENING MAMMOGRAM, ENCOUNTER FOR: ICD-10-CM

## 2024-07-18 PROCEDURE — 77063 BREAST TOMOSYNTHESIS BI: CPT

## 2024-08-02 NOTE — PROGRESS NOTES
----- Message from Luma Torres MD sent at 8/2/2024  1:09 PM CDT -----  Triglycerides 337 the LDL is 86, her thyroid is a little fast if she is taking methimazole 5mg bid she can increase it to 10mg am and 5mg pm then recheck prior to her endocrine appointment   Yanique Champagne  : 1945  [place] / [place] / Product Type: Medicare /  [address] [facility] at [address] [place] [address]. [address] [place] [address], [address]  [phone]   [phone]                                                          Ziggy Agosto MD      OUTPATIENT PHYSICAL THERAPY: Daily Treatment Note 3/16/2020 Visit Count:  3    ICD 10:  Pain in right hip (M25.551)  Stiffness of right hip, not elsewhere classified (M25.651)  Presence of right artificial hip joint (Z96.641)        Pre-treatment Symptoms/Complaints:  No difference in walking and patient has been compliant with HEP. Pain: Initial:0/10 Post Session: 0/10   Medications Last Reviewed:  3/16/2020     Updated Objective Findings: + Trendelenburg on right        TREATMENT:   THERAPEUTIC EXERCISE: (25 minutes):  Exercises per grid below to improve mobility, strength and balance. Required minimal visual, verbal and manual cues to promote proper body alignment and promote proper body posture. Progressed resistance and complexity of movement as indicated. Date:  3/11/2020 Date:  3/13/2020 Date:  3/16/2020   Activity/Exercise Parameters Parameters Parameters   Education HEP, POC, PT goals, anatomy/pathology, PT rationale, education, rehab potential and prognosis Educated on the importance or     Hip Abduction 30xs 30xs 30xs   Hip extension 30xs 30xs 30xs   bridges      Hip hikes  30xs jason 30xs    Clams  30xs green 40xs green   Glut min clams  30xs 30xs   Single stance   3 min         THERAPEUTIC ACTIVITY: ( 18 minutes): Activities per gid below to improve functional movement related mobility, strength and balance to improve neuro-muscular carryover to daily functional activities for improving patient's quality of life. Required visual, verbal and manual cues to promote proper body alignment and promote proper body posture/mechanics.  Progressed resistance and complexity of movement as indicated. Date:  3/11/2020 Date:  3/13/2020 Date:  3/16/2020   Activity/Exercise Parameters Parameters Parameters   Lateral walking   3xs green band 3xs green   Monster walks   3xs green band 3xs green   Shuttle   50lb 3x10  Sl 25 2x10 50lb 3x10  Sl 25 2x10  S/L  2x10 12.5 lbs   CCTKE    30xs purple                              MANUAL THERAPY: (0 minutes): Joint mobilization, Soft tissue mobilization was utilized and necessary because of the patient's restricted joint motion and restricted motion of soft tissue mobility. Date  3/16/2020    Technique Used Grade Level # Time(s) Effect while being performed                                                                 HEP Log Date 1.   2 way hip Abd/ext 3/11/2020   2. Hip hikes 3/11/2020   3.bridges 3/11/2020   4. clams    5. [facility] Portal  Treatment/Session Summary:    Response to Treatment: Pt continued with strengthening and patient is becoming independent with HEP. No pain with exercises but patient is limited with knee pain. Communication/Consultation:  none   Equipment provided today: HEP Handout     Recommendations/Intent for next treatment session:   Next visit will focus on Manual Therapy Core Stability Quad strengthening Hip strengthening soft tissue mobilization. Treatment Plan of Care Effective Dates: 3/11/2020 TO 5/10/2020 (60 days).   Frequency/Duration: 2 times a week for 60 Days             Total Treatment Billable Duration:   43  Rx   PT Patient Time In/Time Out  Time In: 1630  Time Out: [facility],     Future Appointments   Date Time Provider Yoon Suarez   3/17/2020  9:45 AM Judah Tg, PT [facility]   3/25/2020  8:45 AM Judah Lawn, PT [facility]   3/27/2020  7:30 AM Judah Lawn, PT [facility]   3/31/2020  9:45 AM Judah Lawn, PT [facility]   4/3/2020  9:45 AM Judah Lawn, PT [facility]   4/8/2020  9:30 AM Margrette April, PT [facility]   4/10/2020  9:30 AM Margrette April, PT [facility]   4/14/2020  9:45 AM Margrette April, PT [facility]   4/17/2020  9:00 AM Margrette April, PT [facility]   5/20/2020 10:15 AM Judith Lynch MD [facility]

## 2024-12-03 ENCOUNTER — OFFICE VISIT (OUTPATIENT)
Dept: FAMILY MEDICINE CLINIC | Facility: CLINIC | Age: 79
End: 2024-12-03
Payer: MEDICARE

## 2024-12-03 VITALS
DIASTOLIC BLOOD PRESSURE: 76 MMHG | OXYGEN SATURATION: 96 % | BODY MASS INDEX: 24.87 KG/M2 | TEMPERATURE: 97.2 F | HEART RATE: 82 BPM | WEIGHT: 156.4 LBS | SYSTOLIC BLOOD PRESSURE: 136 MMHG

## 2024-12-03 DIAGNOSIS — I10 PRIMARY HYPERTENSION: ICD-10-CM

## 2024-12-03 DIAGNOSIS — E03.4 HYPOTHYROIDISM DUE TO ACQUIRED ATROPHY OF THYROID: ICD-10-CM

## 2024-12-03 DIAGNOSIS — E78.00 HYPERCHOLESTEROLEMIA: ICD-10-CM

## 2024-12-03 PROCEDURE — 1123F ACP DISCUSS/DSCN MKR DOCD: CPT | Performed by: FAMILY MEDICINE

## 2024-12-03 PROCEDURE — G8420 CALC BMI NORM PARAMETERS: HCPCS | Performed by: FAMILY MEDICINE

## 2024-12-03 PROCEDURE — 1160F RVW MEDS BY RX/DR IN RCRD: CPT | Performed by: FAMILY MEDICINE

## 2024-12-03 PROCEDURE — G8399 PT W/DXA RESULTS DOCUMENT: HCPCS | Performed by: FAMILY MEDICINE

## 2024-12-03 PROCEDURE — 1126F AMNT PAIN NOTED NONE PRSNT: CPT | Performed by: FAMILY MEDICINE

## 2024-12-03 PROCEDURE — 3075F SYST BP GE 130 - 139MM HG: CPT | Performed by: FAMILY MEDICINE

## 2024-12-03 PROCEDURE — G8427 DOCREV CUR MEDS BY ELIG CLIN: HCPCS | Performed by: FAMILY MEDICINE

## 2024-12-03 PROCEDURE — 1036F TOBACCO NON-USER: CPT | Performed by: FAMILY MEDICINE

## 2024-12-03 PROCEDURE — 3078F DIAST BP <80 MM HG: CPT | Performed by: FAMILY MEDICINE

## 2024-12-03 PROCEDURE — 99213 OFFICE O/P EST LOW 20 MIN: CPT | Performed by: FAMILY MEDICINE

## 2024-12-03 PROCEDURE — 1090F PRES/ABSN URINE INCON ASSESS: CPT | Performed by: FAMILY MEDICINE

## 2024-12-03 PROCEDURE — 1159F MED LIST DOCD IN RCRD: CPT | Performed by: FAMILY MEDICINE

## 2024-12-03 PROCEDURE — G8484 FLU IMMUNIZE NO ADMIN: HCPCS | Performed by: FAMILY MEDICINE

## 2024-12-03 RX ORDER — AMLODIPINE BESYLATE 5 MG/1
5 TABLET ORAL DAILY
Qty: 90 TABLET | Refills: 1 | Status: SHIPPED | OUTPATIENT
Start: 2024-12-03

## 2024-12-03 RX ORDER — LEVOTHYROXINE SODIUM 25 UG/1
25 TABLET ORAL
Qty: 90 TABLET | Refills: 1 | Status: SHIPPED | OUTPATIENT
Start: 2024-12-03

## 2024-12-03 RX ORDER — PRAVASTATIN SODIUM 10 MG
10 TABLET ORAL DAILY
Qty: 90 TABLET | Refills: 1 | Status: SHIPPED | OUTPATIENT
Start: 2024-12-03

## 2024-12-03 RX ORDER — BENAZEPRIL HYDROCHLORIDE 20 MG/1
20 TABLET ORAL DAILY
Qty: 90 TABLET | Refills: 1 | Status: SHIPPED | OUTPATIENT
Start: 2024-12-03

## 2024-12-03 ASSESSMENT — ENCOUNTER SYMPTOMS
CONSTIPATION: 0
CHEST TIGHTNESS: 0
EYE DISCHARGE: 0
SHORTNESS OF BREATH: 0
COUGH: 0
DIARRHEA: 0
ABDOMINAL PAIN: 0
SINUS PAIN: 0

## 2024-12-03 NOTE — PROGRESS NOTES
Sign     Worried About Running Out of Food in the Last Year: Never true     Ran Out of Food in the Last Year: Never true   Transportation Needs: Unknown (5/29/2024)    PRAPARE - Transportation     Lack of Transportation (Medical): Not on file     Lack of Transportation (Non-Medical): No   Physical Activity: Insufficiently Active (5/29/2024)    Exercise Vital Sign     Days of Exercise per Week: 4 days     Minutes of Exercise per Session: 30 min   Stress: Not on file   Social Connections: Unknown (3/19/2021)    Received from [employer]    Social Connections     Frequency of Communication with Friends and Family: Not asked     Frequency of Social Gatherings with Friends and Family: Not asked   Intimate Partner Violence: Unknown (3/19/2021)    Received from [employer]    Intimate Partner Violence     Fear of Current or Ex-Partner: Not asked     Emotionally Abused: Not asked     Physically Abused: Not asked     Sexually Abused: Not asked   Housing Stability: Unknown (5/29/2024)    Housing Stability Vital Sign     Unable to Pay for Housing in the Last Year: Not on file     Number of Places Lived in the Last Year: Not on file     Unstable Housing in the Last Year: No       Social History     Tobacco Use   Smoking Status Never   Smokeless Tobacco Never       Allergies  No Known Allergies    Vital Signs  Body mass index is 24.87 kg/m².  Vitals:    12/03/24 0836 12/03/24 0900   BP: (!) 146/88 136/76   Pulse: 82    Temp: 97.2 °F (36.2 °C)    TempSrc: Temporal    SpO2: 96%    Weight: 70.9 kg (156 lb 6.4 oz)        Physical Exam  Physical Exam  Vitals reviewed.   Constitutional:       Appearance: Normal appearance.   HENT:      Head: Normocephalic.      Right Ear: Tympanic membrane normal.      Left Ear: Tympanic membrane normal.      Nose: No congestion.      Mouth/Throat:      Pharynx: No oropharyngeal exudate or posterior oropharyngeal erythema.   Eyes:      Extraocular Movements:

## 2025-04-09 ENCOUNTER — TELEPHONE (OUTPATIENT)
Dept: FAMILY MEDICINE CLINIC | Facility: CLINIC | Age: 80
End: 2025-04-09

## 2025-04-09 NOTE — TELEPHONE ENCOUNTER
Patient was seen at urgent care on 3/16/25 for severe constipation and given medication. The medication runs out very soon and she needs to know what to do next. We don't have any open appointments with Dr. Menon until next week. Please call patient to advise at [phone].

## 2025-04-11 ENCOUNTER — OFFICE VISIT (OUTPATIENT)
Dept: FAMILY MEDICINE CLINIC | Facility: CLINIC | Age: 80
End: 2025-04-11

## 2025-04-11 VITALS
SYSTOLIC BLOOD PRESSURE: 114 MMHG | HEART RATE: 84 BPM | TEMPERATURE: 97.1 F | OXYGEN SATURATION: 96 % | WEIGHT: 152.1 LBS | DIASTOLIC BLOOD PRESSURE: 74 MMHG | BODY MASS INDEX: 24.18 KG/M2

## 2025-04-11 DIAGNOSIS — I10 PRIMARY HYPERTENSION: ICD-10-CM

## 2025-04-11 DIAGNOSIS — D36.9 TUBULAR ADENOMA: ICD-10-CM

## 2025-04-11 DIAGNOSIS — K57.90 DIVERTICULOSIS: ICD-10-CM

## 2025-04-11 DIAGNOSIS — R19.5 CHANGE IN STOOL CALIBER: Primary | ICD-10-CM

## 2025-04-11 SDOH — ECONOMIC STABILITY: FOOD INSECURITY: WITHIN THE PAST 12 MONTHS, THE FOOD YOU BOUGHT JUST DIDN'T LAST AND YOU DIDN'T HAVE MONEY TO GET MORE.: NEVER TRUE

## 2025-04-11 SDOH — ECONOMIC STABILITY: FOOD INSECURITY: WITHIN THE PAST 12 MONTHS, YOU WORRIED THAT YOUR FOOD WOULD RUN OUT BEFORE YOU GOT MONEY TO BUY MORE.: NEVER TRUE

## 2025-04-11 ASSESSMENT — ENCOUNTER SYMPTOMS
DIARRHEA: 0
ABDOMINAL PAIN: 1
COUGH: 0
CONSTIPATION: 1
SHORTNESS OF BREATH: 0
SINUS PAIN: 0
EYE DISCHARGE: 0
CHEST TIGHTNESS: 0

## 2025-04-11 ASSESSMENT — PATIENT HEALTH QUESTIONNAIRE - PHQ9
1. LITTLE INTEREST OR PLEASURE IN DOING THINGS: NOT AT ALL
2. FEELING DOWN, DEPRESSED OR HOPELESS: NOT AT ALL
SUM OF ALL RESPONSES TO PHQ QUESTIONS 1-9: 0

## 2025-04-11 NOTE — PROGRESS NOTES
Worried About Running Out of Food in the Last Year: Never true     Ran Out of Food in the Last Year: Never true   Transportation Needs: No Transportation Needs (4/11/2025)    PRAPARE - Transportation     Lack of Transportation (Medical): No     Lack of Transportation (Non-Medical): No   Physical Activity: Insufficiently Active (5/29/2024)    Exercise Vital Sign     Days of Exercise per Week: 4 days     Minutes of Exercise per Session: 30 min   Stress: Not on file   Social Connections: Unknown (3/19/2021)    Received from [employer]    Social Connections     Frequency of Communication with Friends and Family: Not asked     Frequency of Social Gatherings with Friends and Family: Not asked   Intimate Partner Violence: Unknown (3/19/2021)    Received from [employer]    Intimate Partner Violence     Fear of Current or Ex-Partner: Not asked     Emotionally Abused: Not asked     Physically Abused: Not asked     Sexually Abused: Not asked   Housing Stability: Low Risk  (4/11/2025)    Housing Stability Vital Sign     Unable to Pay for Housing in the Last Year: No     Number of Times Moved in the Last Year: 0     Homeless in the Last Year: No       Social History     Tobacco Use   Smoking Status Never   Smokeless Tobacco Never       Allergies  No Known Allergies    Vital Signs  Body mass index is 24.18 kg/m².  Vitals:    04/11/25 1031   BP: 114/74   Pulse: 84   Temp: 97.1 °F (36.2 °C)   TempSrc: Temporal   SpO2: 96%   Weight: 69 kg (152 lb 1.6 oz)       Physical Exam  Physical Exam  Vitals reviewed.   Constitutional:       Appearance: Normal appearance.   HENT:      Head: Normocephalic.      Right Ear: Tympanic membrane normal.      Left Ear: Tympanic membrane normal.      Nose: No congestion.      Mouth/Throat:      Pharynx: No oropharyngeal exudate or posterior oropharyngeal erythema.   Eyes:      Extraocular Movements: Extraocular movements intact.      Conjunctiva/sclera:

## 2025-05-29 ENCOUNTER — LAB (OUTPATIENT)
Dept: FAMILY MEDICINE CLINIC | Facility: CLINIC | Age: 80
End: 2025-05-29

## 2025-05-29 DIAGNOSIS — E03.4 HYPOTHYROIDISM DUE TO ACQUIRED ATROPHY OF THYROID: ICD-10-CM

## 2025-05-29 DIAGNOSIS — I10 PRIMARY HYPERTENSION: Primary | ICD-10-CM

## 2025-05-29 DIAGNOSIS — E78.00 HYPERCHOLESTEROLEMIA: ICD-10-CM

## 2025-05-29 DIAGNOSIS — I10 PRIMARY HYPERTENSION: ICD-10-CM

## 2025-05-29 LAB
ALBUMIN SERPL-MCNC: 3.5 G/DL (ref 3.2–4.6)
ALBUMIN/GLOB SERPL: 1 (ref 1–1.9)
ALP SERPL-CCNC: 81 U/L (ref 35–104)
ALT SERPL-CCNC: 17 U/L (ref 8–45)
ANION GAP SERPL CALC-SCNC: 11 MMOL/L (ref 7–16)
APPEARANCE UR: CLEAR
AST SERPL-CCNC: 26 U/L (ref 15–37)
BACTERIA URNS QL MICRO: ABNORMAL /HPF
BASOPHILS # BLD: 0.04 K/UL (ref 0–0.2)
BASOPHILS NFR BLD: 0.7 % (ref 0–2)
BILIRUB SERPL-MCNC: 1.3 MG/DL (ref 0–1.2)
BILIRUB UR QL: NEGATIVE
BUN SERPL-MCNC: 12 MG/DL (ref 8–23)
CALCIUM SERPL-MCNC: 9.8 MG/DL (ref 8.8–10.2)
CASTS URNS QL MICRO: 0 /LPF
CHLORIDE SERPL-SCNC: 105 MMOL/L (ref 98–107)
CHOLEST SERPL-MCNC: 169 MG/DL (ref 0–200)
CO2 SERPL-SCNC: 27 MMOL/L (ref 20–29)
COLOR UR: ABNORMAL
CREAT SERPL-MCNC: 0.72 MG/DL (ref 0.6–1.1)
CRYSTALS URNS QL MICRO: 0 /LPF
DIFFERENTIAL METHOD BLD: ABNORMAL
EOSINOPHIL # BLD: 0.22 K/UL (ref 0–0.8)
EOSINOPHIL NFR BLD: 3.8 % (ref 0.5–7.8)
EPI CELLS #/AREA URNS HPF: ABNORMAL /HPF
ERYTHROCYTE [DISTWIDTH] IN BLOOD BY AUTOMATED COUNT: 13.6 % (ref 11.9–14.6)
GLOBULIN SER CALC-MCNC: 3.5 G/DL (ref 2.3–3.5)
GLUCOSE SERPL-MCNC: 89 MG/DL (ref 70–99)
GLUCOSE UR STRIP.AUTO-MCNC: NEGATIVE MG/DL
HCT VFR BLD AUTO: 43.7 % (ref 35.8–46.3)
HDLC SERPL-MCNC: 54 MG/DL (ref 40–60)
HDLC SERPL: 3.1 (ref 0–5)
HGB BLD-MCNC: 14.2 G/DL (ref 11.7–15.4)
HGB UR QL STRIP: ABNORMAL
IMM GRANULOCYTES # BLD AUTO: 0.01 K/UL (ref 0–0.5)
IMM GRANULOCYTES NFR BLD AUTO: 0.2 % (ref 0–5)
KETONES UR QL STRIP.AUTO: NEGATIVE MG/DL
LDLC SERPL CALC-MCNC: 97 MG/DL (ref 0–100)
LEUKOCYTE ESTERASE UR QL STRIP.AUTO: ABNORMAL
LYMPHOCYTES # BLD: 1.64 K/UL (ref 0.5–4.6)
LYMPHOCYTES NFR BLD: 28 % (ref 13–44)
MCH RBC QN AUTO: 28.6 PG (ref 26.1–32.9)
MCHC RBC AUTO-ENTMCNC: 32.5 G/DL (ref 31.4–35)
MCV RBC AUTO: 88.1 FL (ref 82–102)
MONOCYTES # BLD: 0.63 K/UL (ref 0.1–1.3)
MONOCYTES NFR BLD: 10.8 % (ref 4–12)
MUCOUS THREADS URNS QL MICRO: 0 /LPF
NEUTS SEG # BLD: 3.32 K/UL (ref 1.7–8.2)
NEUTS SEG NFR BLD: 56.5 % (ref 43–78)
NITRITE UR QL STRIP.AUTO: NEGATIVE
NRBC # BLD: 0 K/UL (ref 0–0.2)
OTHER OBSERVATIONS: ABNORMAL
PH UR STRIP: 6.5 (ref 5–9)
PLATELET # BLD AUTO: 228 K/UL (ref 150–450)
PMV BLD AUTO: 9.1 FL (ref 9.4–12.3)
POTASSIUM SERPL-SCNC: 3.9 MMOL/L (ref 3.5–5.1)
PROT SERPL-MCNC: 7 G/DL (ref 6.3–8.2)
PROT UR STRIP-MCNC: NEGATIVE MG/DL
RBC # BLD AUTO: 4.96 M/UL (ref 4.05–5.2)
RBC #/AREA URNS HPF: ABNORMAL /HPF
SODIUM SERPL-SCNC: 143 MMOL/L (ref 136–145)
SP GR UR REFRACTOMETRY: 1.01 (ref 1–1.02)
TRIGL SERPL-MCNC: 92 MG/DL (ref 0–150)
TSH W FREE THYROID IF ABNORMAL: 2.54 UIU/ML (ref 0.27–4.2)
URINE CULTURE IF INDICATED: ABNORMAL
UROBILINOGEN UR QL STRIP.AUTO: 0.2 EU/DL (ref 0.2–1)
VLDLC SERPL CALC-MCNC: 18 MG/DL (ref 6–23)
WBC # BLD AUTO: 5.9 K/UL (ref 4.3–11.1)
WBC URNS QL MICRO: ABNORMAL /HPF

## 2025-06-05 ENCOUNTER — OFFICE VISIT (OUTPATIENT)
Dept: FAMILY MEDICINE CLINIC | Facility: CLINIC | Age: 80
End: 2025-06-05

## 2025-06-05 VITALS
BODY MASS INDEX: 23.01 KG/M2 | OXYGEN SATURATION: 97 % | SYSTOLIC BLOOD PRESSURE: 118 MMHG | TEMPERATURE: 97.4 F | HEIGHT: 67 IN | DIASTOLIC BLOOD PRESSURE: 80 MMHG | WEIGHT: 146.6 LBS | HEART RATE: 86 BPM

## 2025-06-05 DIAGNOSIS — M25.561 ACUTE PAIN OF RIGHT KNEE: ICD-10-CM

## 2025-06-05 DIAGNOSIS — Z00.00 ENCOUNTER FOR ANNUAL WELLNESS VISIT (AWV) IN MEDICARE PATIENT: ICD-10-CM

## 2025-06-05 DIAGNOSIS — M15.0 PRIMARY OSTEOARTHRITIS INVOLVING MULTIPLE JOINTS: ICD-10-CM

## 2025-06-05 DIAGNOSIS — E03.4 HYPOTHYROIDISM DUE TO ACQUIRED ATROPHY OF THYROID: ICD-10-CM

## 2025-06-05 DIAGNOSIS — I10 PRIMARY HYPERTENSION: ICD-10-CM

## 2025-06-05 DIAGNOSIS — E78.00 HYPERCHOLESTEROLEMIA: ICD-10-CM

## 2025-06-05 DIAGNOSIS — Z00.00 MEDICARE ANNUAL WELLNESS VISIT, SUBSEQUENT: Primary | ICD-10-CM

## 2025-06-05 RX ORDER — LEVOTHYROXINE SODIUM 25 UG/1
25 TABLET ORAL
Qty: 90 TABLET | Refills: 1 | Status: SHIPPED | OUTPATIENT
Start: 2025-06-05

## 2025-06-05 RX ORDER — BENAZEPRIL HYDROCHLORIDE 20 MG/1
20 TABLET ORAL DAILY
Qty: 90 TABLET | Refills: 1 | Status: SHIPPED | OUTPATIENT
Start: 2025-06-05

## 2025-06-05 RX ORDER — DOCUSATE SODIUM 100 MG/1
100 CAPSULE, LIQUID FILLED ORAL 2 TIMES DAILY
COMMUNITY

## 2025-06-05 RX ORDER — AMLODIPINE BESYLATE 5 MG/1
5 TABLET ORAL DAILY
Qty: 90 TABLET | Refills: 1 | Status: SHIPPED | OUTPATIENT
Start: 2025-06-05

## 2025-06-05 RX ORDER — PRAVASTATIN SODIUM 10 MG
10 TABLET ORAL DAILY
Qty: 90 TABLET | Refills: 1 | Status: SHIPPED | OUTPATIENT
Start: 2025-06-05

## 2025-06-05 ASSESSMENT — PATIENT HEALTH QUESTIONNAIRE - PHQ9
SUM OF ALL RESPONSES TO PHQ QUESTIONS 1-9: 0
2. FEELING DOWN, DEPRESSED OR HOPELESS: NOT AT ALL
1. LITTLE INTEREST OR PLEASURE IN DOING THINGS: NOT AT ALL

## 2025-06-05 ASSESSMENT — LIFESTYLE VARIABLES
HOW OFTEN DO YOU HAVE A DRINK CONTAINING ALCOHOL: NEVER
HOW MANY STANDARD DRINKS CONTAINING ALCOHOL DO YOU HAVE ON A TYPICAL DAY: PATIENT DOES NOT DRINK

## 2025-06-05 NOTE — PROGRESS NOTES
Medicare Annual Wellness Visit    Natalie Mooney is here for Medicare AWV, Other (Would like to discuss osteoarthritis and joint/bone issues. ), and Knee Pain (Posterior right knee)    Assessment & Plan   Encounter for annual wellness visit (AWV) in Medicare patient  Primary hypertension  -     amLODIPine (NORVASC) 5 MG tablet; Take 1 tablet by mouth daily, Disp-90 tablet, R-1Normal  -     benazepril (LOTENSIN) 20 MG tablet; Take 1 tablet by mouth daily, Disp-90 tablet, R-1Normal  Hypothyroidism due to acquired atrophy of thyroid  -     levothyroxine (SYNTHROID) 25 MCG tablet; Take 1 tablet by mouth every morning (before breakfast), Disp-90 tablet, R-1Normal  Primary osteoarthritis involving multiple joints  Hypercholesterolemia  -     pravastatin (PRAVACHOL) 10 MG tablet; Take 1 tablet by mouth daily, Disp-90 tablet, R-1Normal  Acute pain of right knee     Ibuprofen or tylenol; Voltaren gel; recheck with Dr Oneal if knee pain persists       Return in about 6 months (around 12/5/2025) for recheck.     Subjective   The following acute and/or chronic problems were also addressed today:  Increased pain behind R knee.  Feels like knee may give out.  Using cane at times. R THR 2019 and R hip fx 2022. Hx of OA in knee.  Here for recheck of thyroid.  No temperature intolerance.  No palpitations.  No skin or hair changes.  No increased fatigue.  No jitteriness.  Mood good. BP has been doing well at home.  No CP or SOB.  No headaches or edema.  No side effects with medications.     Patient's complete Health Risk Assessment and screening values have been reviewed and are found in Flowsheets. The following problems were reviewed today and where indicated follow up appointments were made and/or referrals ordered.    Positive Risk Factor Screenings with Interventions:    Fall Risk:  Do you feel unsteady or are you worried about falling? : no  2 or more falls in past year?: (!) yes  Fall with injury in past year?: no

## 2025-07-08 ENCOUNTER — TRANSCRIBE ORDERS (OUTPATIENT)
Dept: SCHEDULING | Age: 80
End: 2025-07-08

## 2025-07-08 DIAGNOSIS — Z12.31 OTHER SCREENING MAMMOGRAM: Primary | ICD-10-CM

## 2025-07-24 ENCOUNTER — HOSPITAL ENCOUNTER (OUTPATIENT)
Dept: MAMMOGRAPHY | Age: 80
Discharge: HOME | End: 2025-07-27
Attending: FAMILY MEDICINE
Payer: MEDICARE

## 2025-07-24 DIAGNOSIS — Z12.31 OTHER SCREENING MAMMOGRAM: ICD-10-CM

## 2025-07-24 PROCEDURE — 77063 BREAST TOMOSYNTHESIS BI: CPT

## 2025-08-20 ENCOUNTER — OFFICE VISIT (OUTPATIENT)
Dept: FAMILY MEDICINE CLINIC | Facility: CLINIC | Age: 80
End: 2025-08-20

## 2025-08-20 VITALS
HEART RATE: 78 BPM | SYSTOLIC BLOOD PRESSURE: 120 MMHG | OXYGEN SATURATION: 97 % | WEIGHT: 150 LBS | DIASTOLIC BLOOD PRESSURE: 78 MMHG | TEMPERATURE: 97.3 F | BODY MASS INDEX: 23.85 KG/M2

## 2025-08-20 DIAGNOSIS — M15.0 PRIMARY OSTEOARTHRITIS INVOLVING MULTIPLE JOINTS: ICD-10-CM

## 2025-08-20 DIAGNOSIS — M79.661 RIGHT CALF PAIN: Primary | ICD-10-CM

## 2025-08-20 DIAGNOSIS — I10 PRIMARY HYPERTENSION: ICD-10-CM

## 2025-08-20 PROBLEM — Z12.11 SPECIAL SCREENING FOR MALIGNANT NEOPLASMS, COLON: Status: ACTIVE | Noted: 2025-05-06

## 2025-08-20 PROBLEM — R93.1 ABNORMAL FINDINGS ON DIAGNOSTIC IMAGING OF HEART AND CORONARY CIRCULATION: Status: ACTIVE | Noted: 2025-08-20

## 2025-08-20 PROBLEM — S46.919A MUSCLE STRAIN OF SHOULDER REGION: Status: ACTIVE | Noted: 2024-10-23

## 2025-08-20 PROBLEM — R42 DIZZINESS AND GIDDINESS: Status: ACTIVE | Noted: 2025-08-20

## 2025-08-20 PROBLEM — R10.12 ABDOMINAL PAIN, LUQ: Status: ACTIVE | Noted: 2025-08-20

## 2025-08-20 PROBLEM — R10.31 RLQ ABDOMINAL PAIN: Status: ACTIVE | Noted: 2025-08-20

## 2025-08-20 PROBLEM — K57.30 DVRTCLOS OF LG INT W/O PERFORATION OR ABSCESS W/O BLEEDING: Status: ACTIVE | Noted: 2025-05-06

## 2025-08-20 PROBLEM — K59.00 CONSTIPATION: Status: ACTIVE | Noted: 2025-03-16

## 2025-08-20 PROBLEM — K63.5 COLON POLYPS: Status: ACTIVE | Noted: 2025-08-20

## 2025-08-20 PROBLEM — Z80.0 FAMILY HISTORY OF COLON CANCER: Status: ACTIVE | Noted: 2018-05-11

## 2025-08-20 PROBLEM — D12.3 BENIGN NEOPLASM OF TRANSVERSE COLON: Status: ACTIVE | Noted: 2025-08-20

## 2025-08-20 PROBLEM — R10.9 RIGHT FLANK PAIN: Status: ACTIVE | Noted: 2025-08-20

## 2025-08-20 PROBLEM — J40 BRONCHITIS, NOT SPECIFIED AS ACUTE OR CHRONIC: Status: ACTIVE | Noted: 2017-07-09

## 2025-08-20 RX ORDER — POLYETHYLENE GLYCOL 3350
23 POWDER (GRAM) MISCELLANEOUS DAILY PRN
COMMUNITY
Start: 2025-03-16

## 2025-08-22 ENCOUNTER — HOSPITAL ENCOUNTER (OUTPATIENT)
Dept: ULTRASOUND IMAGING | Age: 80
Discharge: HOME | End: 2025-08-24
Payer: MEDICARE

## 2025-08-22 DIAGNOSIS — M79.661 RIGHT CALF PAIN: ICD-10-CM

## 2025-08-22 PROCEDURE — 93971 EXTREMITY STUDY: CPT

## (undated) DEVICE — SUTURE VCRL SZ 2-0 L27IN ABSRB UD L36MM CP-1 1/2 CIR REV J266H

## (undated) DEVICE — T4 HOOD

## (undated) DEVICE — SUTURE VCRL SZ 1 L36IN ABSRB UD CTX L48MM 1/2 CIR J977H

## (undated) DEVICE — ZIP 16 SURGICAL SKIN CLOSURE DEVICE: Brand: ZIP 16 SURGICAL SKIN CLOSURE DEVICE

## (undated) DEVICE — SURGICAL PROCEDURE PACK TOT HIP CDS

## (undated) DEVICE — DRAPE,HIP,W/POUCHES,STERILE: Brand: MEDLINE

## (undated) DEVICE — 3000CC GUARDIAN II: Brand: GUARDIAN

## (undated) DEVICE — YANKAUER,BULB TIP,W/O VENT,RIGID,STERILE: Brand: MEDLINE

## (undated) DEVICE — SUTURE PDS II SZ 1 L54IN ABSRB VLT L65MM TP-1 1/2 CIR Z879G

## (undated) DEVICE — 1840 FOAM BLOCK NEEDLE COUNTER: Brand: DEVON

## (undated) DEVICE — TRAY PREP DRY W/ PREM GLV 2 APPL 6 SPNG 2 UNDPD 1 OVERWRAP

## (undated) DEVICE — BUTTON SWITCH PENCIL BLADE ELECTRODE, HOLSTER: Brand: EDGE

## (undated) DEVICE — SYR LR LCK 1ML GRAD NSAF 30ML --

## (undated) DEVICE — 2000CC GUARDIAN II: Brand: GUARDIAN

## (undated) DEVICE — HEWSON SUTURE RETRIEVER: Brand: HEWSON SUTURE RETRIEVER

## (undated) DEVICE — BANDAGE COMPR SELF ADH 5 YDX4 IN TAN STRL PREMIERPRO LF

## (undated) DEVICE — NEEDLE SPNL 22GA L3.5IN BLK HUB S STL REG WALL FIT STYL W/

## (undated) DEVICE — SYRINGE CATH TIP 50ML

## (undated) DEVICE — CLOSURE SKIN FACILITATES COMPATIBILITY W/ CERTAIN IS DSG

## (undated) DEVICE — HANDPIECE SET WITH COAXIAL HIGH FLOW TIP AND SUCTION TUBE: Brand: INTERPULSE

## (undated) DEVICE — Device

## (undated) DEVICE — REM POLYHESIVE ADULT PATIENT RETURN ELECTRODE: Brand: VALLEYLAB

## (undated) DEVICE — 3M™ IOBAN™ 2 ANTIMICROBIAL INCISE DRAPE 6651EZ: Brand: IOBAN™ 2

## (undated) DEVICE — SOLUTION IRRIG 3000ML 0.9% SOD CHL FLX CONT 0797208] ICU MEDICAL INC]

## (undated) DEVICE — Z DISCONTINUED USE 2744636  DRESSING AQUACEL 14 IN ALG W3.5XL14IN POLYUR FLM CVR W/ HYDRCOLL

## (undated) DEVICE — SUTURE ETHBND EXCEL SZ 5 L30IN NONABSORBABLE GRN L40MM V-37 MB66G

## (undated) DEVICE — SUTURE VCRL SZ 1 L27IN ABSRB UD L36MM CP-1 1/2 CIR REV CUT J268H

## (undated) DEVICE — SOLUTION IV 1000ML 0.9% SOD CHL

## (undated) DEVICE — STRYKER PERFORMANCE SERIES SAGITTAL BLADE: Brand: STRYKER PERFORMANCE SERIES

## (undated) DEVICE — (D)PREP SKN CHLRAPRP APPL 26ML -- CONVERT TO ITEM 371833